# Patient Record
Sex: MALE | Race: WHITE | Employment: OTHER | URBAN - NONMETROPOLITAN AREA
[De-identification: names, ages, dates, MRNs, and addresses within clinical notes are randomized per-mention and may not be internally consistent; named-entity substitution may affect disease eponyms.]

---

## 2017-01-06 ENCOUNTER — NURSE ONLY (OUTPATIENT)
Dept: LAB | Age: 82
End: 2017-01-06

## 2017-01-06 DIAGNOSIS — Z23 NEED FOR VACCINATION: Primary | ICD-10-CM

## 2017-01-06 PROCEDURE — 90686 IIV4 VACC NO PRSV 0.5 ML IM: CPT | Performed by: FAMILY MEDICINE

## 2017-01-06 PROCEDURE — G0008 ADMIN INFLUENZA VIRUS VAC: HCPCS | Performed by: FAMILY MEDICINE

## 2017-01-09 RX ORDER — CLOPIDOGREL BISULFATE 75 MG/1
TABLET ORAL
Qty: 90 TABLET | Refills: 2 | Status: ON HOLD | OUTPATIENT
Start: 2017-01-09 | End: 2017-10-22 | Stop reason: ALTCHOICE

## 2017-01-09 RX ORDER — HYDROCHLOROTHIAZIDE 25 MG/1
TABLET ORAL
Qty: 90 TABLET | Refills: 2 | Status: ON HOLD | OUTPATIENT
Start: 2017-01-09 | End: 2017-10-24 | Stop reason: HOSPADM

## 2017-01-16 RX ORDER — LISINOPRIL 20 MG/1
TABLET ORAL
Qty: 90 TABLET | Refills: 2 | Status: SHIPPED | OUTPATIENT
Start: 2017-01-16 | End: 2017-10-13 | Stop reason: SDUPTHER

## 2017-01-23 RX ORDER — RIVAROXABAN 20 MG/1
TABLET, FILM COATED ORAL
Qty: 90 TABLET | Refills: 0 | Status: SHIPPED | OUTPATIENT
Start: 2017-01-23 | End: 2017-04-21 | Stop reason: SDUPTHER

## 2017-02-09 RX ORDER — TAMSULOSIN HYDROCHLORIDE 0.4 MG/1
CAPSULE ORAL
Qty: 90 CAPSULE | Refills: 0 | Status: SHIPPED | OUTPATIENT
Start: 2017-02-09 | End: 2017-05-08 | Stop reason: SDUPTHER

## 2017-03-23 RX ORDER — BETHANECHOL CHLORIDE 25 MG/1
25 TABLET ORAL EVERY 6 HOURS
Qty: 120 TABLET | Refills: 3 | Status: SHIPPED | OUTPATIENT
Start: 2017-03-23 | End: 2017-11-28 | Stop reason: SDUPTHER

## 2017-05-08 RX ORDER — TAMSULOSIN HYDROCHLORIDE 0.4 MG/1
CAPSULE ORAL
Qty: 90 CAPSULE | Refills: 1 | Status: SHIPPED | OUTPATIENT
Start: 2017-05-08 | End: 2017-11-03 | Stop reason: SDUPTHER

## 2017-06-05 RX ORDER — FUROSEMIDE 40 MG/1
TABLET ORAL
Qty: 90 TABLET | Refills: 0 | Status: SHIPPED | OUTPATIENT
Start: 2017-06-05 | End: 2017-09-03 | Stop reason: SDUPTHER

## 2017-07-24 RX ORDER — RIVAROXABAN 20 MG/1
TABLET, FILM COATED ORAL
Qty: 90 TABLET | Refills: 1 | Status: ON HOLD | OUTPATIENT
Start: 2017-07-24 | End: 2017-10-24 | Stop reason: HOSPADM

## 2017-08-28 RX ORDER — SIMVASTATIN 20 MG
TABLET ORAL
Qty: 90 TABLET | Refills: 2 | Status: SHIPPED | OUTPATIENT
Start: 2017-08-28 | End: 2018-05-25 | Stop reason: SDUPTHER

## 2017-09-05 RX ORDER — FUROSEMIDE 40 MG/1
TABLET ORAL
Qty: 90 TABLET | Refills: 0 | Status: SHIPPED | OUTPATIENT
Start: 2017-09-05 | End: 2017-11-03 | Stop reason: SDUPTHER

## 2017-10-15 RX ORDER — LISINOPRIL 20 MG/1
20 TABLET ORAL DAILY
Qty: 90 TABLET | Refills: 0 | Status: SHIPPED | OUTPATIENT
Start: 2017-10-15 | End: 2017-11-03 | Stop reason: SDUPTHER

## 2017-10-22 ENCOUNTER — APPOINTMENT (OUTPATIENT)
Dept: GENERAL RADIOLOGY | Age: 82
DRG: 313 | End: 2017-10-22
Payer: MEDICARE

## 2017-10-22 ENCOUNTER — HOSPITAL ENCOUNTER (INPATIENT)
Age: 82
LOS: 1 days | Discharge: OTHER INSTITUTION | DRG: 313 | End: 2017-10-23
Attending: SPECIALIST | Admitting: FAMILY MEDICINE
Payer: MEDICARE

## 2017-10-22 DIAGNOSIS — I16.0 HYPERTENSIVE URGENCY: ICD-10-CM

## 2017-10-22 DIAGNOSIS — R07.9 CHEST PAIN, UNSPECIFIED TYPE: Primary | ICD-10-CM

## 2017-10-22 LAB
ABSOLUTE EOS #: 0 K/UL (ref 0–0.4)
ABSOLUTE IMMATURE GRANULOCYTE: ABNORMAL K/UL (ref 0–0.3)
ABSOLUTE LYMPH #: 1.6 K/UL (ref 1–4.8)
ABSOLUTE MONO #: 0.7 K/UL (ref 0.1–1.2)
ANION GAP SERPL CALCULATED.3IONS-SCNC: 13 MMOL/L (ref 9–17)
BASOPHILS # BLD: 1 % (ref 0–2)
BASOPHILS ABSOLUTE: 0.1 K/UL (ref 0–0.2)
BUN BLDV-MCNC: 20 MG/DL (ref 8–23)
BUN/CREAT BLD: 14 (ref 9–20)
CALCIUM SERPL-MCNC: 9.8 MG/DL (ref 8.6–10.4)
CHLORIDE BLD-SCNC: 98 MMOL/L (ref 98–107)
CO2: 29 MMOL/L (ref 20–31)
CREAT SERPL-MCNC: 1.44 MG/DL (ref 0.7–1.2)
D DIMER: 2450 NG/ML
DIFFERENTIAL TYPE: ABNORMAL
EKG ATRIAL RATE: 66 BPM
EKG P AXIS: 85 DEGREES
EKG P-R INTERVAL: 236 MS
EKG Q-T INTERVAL: 404 MS
EKG QRS DURATION: 98 MS
EKG QTC CALCULATION (BAZETT): 423 MS
EKG R AXIS: 62 DEGREES
EKG T AXIS: 67 DEGREES
EKG VENTRICULAR RATE: 66 BPM
EOSINOPHILS RELATIVE PERCENT: 0 % (ref 1–8)
GFR AFRICAN AMERICAN: 57 ML/MIN
GFR NON-AFRICAN AMERICAN: 47 ML/MIN
GFR SERPL CREATININE-BSD FRML MDRD: ABNORMAL ML/MIN/{1.73_M2}
GFR SERPL CREATININE-BSD FRML MDRD: ABNORMAL ML/MIN/{1.73_M2}
GLUCOSE BLD-MCNC: 118 MG/DL (ref 70–99)
HCT VFR BLD CALC: 46.2 % (ref 41–53)
HEMOGLOBIN: 15.5 G/DL (ref 13.5–17.5)
IMMATURE GRANULOCYTES: ABNORMAL %
LIPASE: 48 U/L (ref 13–60)
LYMPHOCYTES # BLD: 13 % (ref 15–43)
MAGNESIUM: 2.1 MG/DL (ref 1.6–2.6)
MCH RBC QN AUTO: 30.2 PG (ref 26–34)
MCHC RBC AUTO-ENTMCNC: 33.6 G/DL (ref 31–37)
MCV RBC AUTO: 90.1 FL (ref 80–100)
MONOCYTES # BLD: 6 % (ref 6–14)
MYOGLOBIN: 49 NG/ML (ref 28–72)
MYOGLOBIN: 55 NG/ML (ref 28–72)
PDW BLD-RTO: 16 % (ref 11–14.5)
PLATELET # BLD: 157 K/UL (ref 140–450)
PLATELET ESTIMATE: ABNORMAL
PMV BLD AUTO: 9.3 FL (ref 6–12)
POTASSIUM SERPL-SCNC: 4 MMOL/L (ref 3.7–5.3)
RBC # BLD: 5.13 M/UL (ref 4.5–5.9)
RBC # BLD: ABNORMAL 10*6/UL
SEG NEUTROPHILS: 80 % (ref 44–74)
SEGMENTED NEUTROPHILS ABSOLUTE COUNT: 9.6 K/UL (ref 1.8–7.7)
SODIUM BLD-SCNC: 140 MMOL/L (ref 135–144)
TROPONIN INTERP: NORMAL
TROPONIN T: <0.03 NG/ML
WBC # BLD: 12 K/UL (ref 3.5–11)
WBC # BLD: ABNORMAL 10*3/UL

## 2017-10-22 PROCEDURE — 84484 ASSAY OF TROPONIN QUANT: CPT

## 2017-10-22 PROCEDURE — 6360000002 HC RX W HCPCS: Performed by: FAMILY MEDICINE

## 2017-10-22 PROCEDURE — 96365 THER/PROPH/DIAG IV INF INIT: CPT

## 2017-10-22 PROCEDURE — 96366 THER/PROPH/DIAG IV INF ADDON: CPT

## 2017-10-22 PROCEDURE — 93005 ELECTROCARDIOGRAM TRACING: CPT

## 2017-10-22 PROCEDURE — 36415 COLL VENOUS BLD VENIPUNCTURE: CPT

## 2017-10-22 PROCEDURE — 6370000000 HC RX 637 (ALT 250 FOR IP): Performed by: SPECIALIST

## 2017-10-22 PROCEDURE — 71020 XR CHEST STANDARD TWO VW: CPT

## 2017-10-22 PROCEDURE — 2000000000 HC ICU R&B

## 2017-10-22 PROCEDURE — 6360000002 HC RX W HCPCS: Performed by: NURSE PRACTITIONER

## 2017-10-22 PROCEDURE — 80048 BASIC METABOLIC PNL TOTAL CA: CPT

## 2017-10-22 PROCEDURE — 2580000003 HC RX 258: Performed by: FAMILY MEDICINE

## 2017-10-22 PROCEDURE — 87040 BLOOD CULTURE FOR BACTERIA: CPT

## 2017-10-22 PROCEDURE — C9113 INJ PANTOPRAZOLE SODIUM, VIA: HCPCS | Performed by: NURSE PRACTITIONER

## 2017-10-22 PROCEDURE — 94761 N-INVAS EAR/PLS OXIMETRY MLT: CPT

## 2017-10-22 PROCEDURE — 96375 TX/PRO/DX INJ NEW DRUG ADDON: CPT

## 2017-10-22 PROCEDURE — 83735 ASSAY OF MAGNESIUM: CPT

## 2017-10-22 PROCEDURE — 83874 ASSAY OF MYOGLOBIN: CPT

## 2017-10-22 PROCEDURE — 83690 ASSAY OF LIPASE: CPT

## 2017-10-22 PROCEDURE — 99285 EMERGENCY DEPT VISIT HI MDM: CPT

## 2017-10-22 PROCEDURE — 2580000003 HC RX 258: Performed by: NURSE PRACTITIONER

## 2017-10-22 PROCEDURE — 96376 TX/PRO/DX INJ SAME DRUG ADON: CPT

## 2017-10-22 PROCEDURE — 85379 FIBRIN DEGRADATION QUANT: CPT

## 2017-10-22 PROCEDURE — 6360000002 HC RX W HCPCS: Performed by: SPECIALIST

## 2017-10-22 PROCEDURE — 85025 COMPLETE CBC W/AUTO DIFF WBC: CPT

## 2017-10-22 PROCEDURE — 2500000003 HC RX 250 WO HCPCS: Performed by: SPECIALIST

## 2017-10-22 PROCEDURE — 6370000000 HC RX 637 (ALT 250 FOR IP): Performed by: FAMILY MEDICINE

## 2017-10-22 RX ORDER — PANTOPRAZOLE SODIUM 40 MG/10ML
40 INJECTION, POWDER, LYOPHILIZED, FOR SOLUTION INTRAVENOUS DAILY
Status: DISCONTINUED | OUTPATIENT
Start: 2017-10-22 | End: 2017-10-23 | Stop reason: HOSPADM

## 2017-10-22 RX ORDER — ACETAMINOPHEN 325 MG/1
650 TABLET ORAL EVERY 4 HOURS PRN
Status: DISCONTINUED | OUTPATIENT
Start: 2017-10-22 | End: 2017-10-23 | Stop reason: HOSPADM

## 2017-10-22 RX ORDER — 0.9 % SODIUM CHLORIDE 0.9 %
10 VIAL (ML) INJECTION DAILY
Status: DISCONTINUED | OUTPATIENT
Start: 2017-10-22 | End: 2017-10-23 | Stop reason: HOSPADM

## 2017-10-22 RX ORDER — ONDANSETRON 2 MG/ML
4 INJECTION INTRAMUSCULAR; INTRAVENOUS ONCE
Status: COMPLETED | OUTPATIENT
Start: 2017-10-22 | End: 2017-10-22

## 2017-10-22 RX ORDER — ONDANSETRON 2 MG/ML
4 INJECTION INTRAMUSCULAR; INTRAVENOUS EVERY 6 HOURS PRN
Status: DISCONTINUED | OUTPATIENT
Start: 2017-10-22 | End: 2017-10-23 | Stop reason: HOSPADM

## 2017-10-22 RX ORDER — NITROGLYCERIN 0.4 MG/1
0.4 TABLET SUBLINGUAL ONCE
Status: COMPLETED | OUTPATIENT
Start: 2017-10-22 | End: 2017-10-22

## 2017-10-22 RX ORDER — LEVOFLOXACIN 5 MG/ML
500 INJECTION, SOLUTION INTRAVENOUS EVERY 24 HOURS
Status: DISCONTINUED | OUTPATIENT
Start: 2017-10-22 | End: 2017-10-23 | Stop reason: HOSPADM

## 2017-10-22 RX ORDER — MORPHINE SULFATE 4 MG/ML
4 INJECTION, SOLUTION INTRAMUSCULAR; INTRAVENOUS
Status: DISCONTINUED | OUTPATIENT
Start: 2017-10-22 | End: 2017-10-23 | Stop reason: HOSPADM

## 2017-10-22 RX ORDER — ASPIRIN 81 MG/1
324 TABLET, CHEWABLE ORAL ONCE
Status: COMPLETED | OUTPATIENT
Start: 2017-10-22 | End: 2017-10-22

## 2017-10-22 RX ORDER — AMLODIPINE BESYLATE 5 MG/1
10 TABLET ORAL DAILY
Status: DISCONTINUED | OUTPATIENT
Start: 2017-10-22 | End: 2017-10-23 | Stop reason: HOSPADM

## 2017-10-22 RX ORDER — ASPIRIN 81 MG/1
81 TABLET ORAL DAILY
Status: DISCONTINUED | OUTPATIENT
Start: 2017-10-22 | End: 2017-10-23 | Stop reason: HOSPADM

## 2017-10-22 RX ORDER — CLOPIDOGREL BISULFATE 75 MG/1
75 TABLET ORAL DAILY
Status: DISCONTINUED | OUTPATIENT
Start: 2017-10-22 | End: 2017-10-22 | Stop reason: CLARIF

## 2017-10-22 RX ORDER — LISINOPRIL 20 MG/1
20 TABLET ORAL DAILY
Status: DISCONTINUED | OUTPATIENT
Start: 2017-10-22 | End: 2017-10-23 | Stop reason: HOSPADM

## 2017-10-22 RX ORDER — LABETALOL HYDROCHLORIDE 5 MG/ML
20 INJECTION, SOLUTION INTRAVENOUS ONCE
Status: COMPLETED | OUTPATIENT
Start: 2017-10-22 | End: 2017-10-22

## 2017-10-22 RX ORDER — ONDANSETRON 2 MG/ML
INJECTION INTRAMUSCULAR; INTRAVENOUS
Status: DISCONTINUED
Start: 2017-10-22 | End: 2017-10-22

## 2017-10-22 RX ORDER — FUROSEMIDE 40 MG/1
40 TABLET ORAL DAILY
Status: DISCONTINUED | OUTPATIENT
Start: 2017-10-22 | End: 2017-10-23 | Stop reason: HOSPADM

## 2017-10-22 RX ORDER — MORPHINE SULFATE 2 MG/ML
2 INJECTION, SOLUTION INTRAMUSCULAR; INTRAVENOUS
Status: DISCONTINUED | OUTPATIENT
Start: 2017-10-22 | End: 2017-10-23 | Stop reason: HOSPADM

## 2017-10-22 RX ORDER — PROMETHAZINE HYDROCHLORIDE 25 MG/ML
12.5 INJECTION, SOLUTION INTRAMUSCULAR; INTRAVENOUS ONCE
Status: COMPLETED | OUTPATIENT
Start: 2017-10-22 | End: 2017-10-22

## 2017-10-22 RX ORDER — BETHANECHOL CHLORIDE 25 MG/1
25 TABLET ORAL EVERY 6 HOURS
Status: DISCONTINUED | OUTPATIENT
Start: 2017-10-22 | End: 2017-10-23 | Stop reason: HOSPADM

## 2017-10-22 RX ORDER — SODIUM CHLORIDE 0.9 % (FLUSH) 0.9 %
10 SYRINGE (ML) INJECTION PRN
Status: DISCONTINUED | OUTPATIENT
Start: 2017-10-22 | End: 2017-10-23 | Stop reason: HOSPADM

## 2017-10-22 RX ORDER — SIMVASTATIN 20 MG
20 TABLET ORAL NIGHTLY
Status: DISCONTINUED | OUTPATIENT
Start: 2017-10-22 | End: 2017-10-23 | Stop reason: HOSPADM

## 2017-10-22 RX ORDER — SODIUM CHLORIDE 0.9 % (FLUSH) 0.9 %
10 SYRINGE (ML) INJECTION EVERY 12 HOURS SCHEDULED
Status: DISCONTINUED | OUTPATIENT
Start: 2017-10-22 | End: 2017-10-23 | Stop reason: HOSPADM

## 2017-10-22 RX ORDER — NITROGLYCERIN 20 MG/100ML
10 INJECTION INTRAVENOUS CONTINUOUS
Status: DISCONTINUED | OUTPATIENT
Start: 2017-10-22 | End: 2017-10-23 | Stop reason: HOSPADM

## 2017-10-22 RX ORDER — TAMSULOSIN HYDROCHLORIDE 0.4 MG/1
0.4 CAPSULE ORAL DAILY
Status: DISCONTINUED | OUTPATIENT
Start: 2017-10-22 | End: 2017-10-23 | Stop reason: HOSPADM

## 2017-10-22 RX ADMIN — PANTOPRAZOLE SODIUM 40 MG: 40 INJECTION, POWDER, FOR SOLUTION INTRAVENOUS at 20:47

## 2017-10-22 RX ADMIN — FUROSEMIDE 40 MG: 40 TABLET ORAL at 14:54

## 2017-10-22 RX ADMIN — LEVOFLOXACIN 500 MG: 5 INJECTION, SOLUTION INTRAVENOUS at 17:10

## 2017-10-22 RX ADMIN — NITROGLYCERIN 10 MCG/MIN: 20 INJECTION INTRAVENOUS at 08:46

## 2017-10-22 RX ADMIN — ACETAMINOPHEN 650 MG: 325 TABLET ORAL at 16:23

## 2017-10-22 RX ADMIN — NITROGLYCERIN 0.4 MG: 0.4 TABLET SUBLINGUAL at 08:44

## 2017-10-22 RX ADMIN — Medication 10 ML: at 14:53

## 2017-10-22 RX ADMIN — ONDANSETRON 4 MG: 2 INJECTION INTRAMUSCULAR; INTRAVENOUS at 10:07

## 2017-10-22 RX ADMIN — ONDANSETRON 4 MG: 2 INJECTION INTRAMUSCULAR; INTRAVENOUS at 08:42

## 2017-10-22 RX ADMIN — Medication 10 ML: at 20:47

## 2017-10-22 RX ADMIN — BETHANECHOL CHLORIDE 25 MG: 25 TABLET ORAL at 15:00

## 2017-10-22 RX ADMIN — LABETALOL HYDROCHLORIDE 20 MG: 5 INJECTION, SOLUTION INTRAVENOUS at 12:56

## 2017-10-22 RX ADMIN — RIVAROXABAN 20 MG: 10 TABLET, FILM COATED ORAL at 17:49

## 2017-10-22 RX ADMIN — MORPHINE SULFATE 2 MG: 2 INJECTION, SOLUTION INTRAMUSCULAR; INTRAVENOUS at 14:53

## 2017-10-22 RX ADMIN — AMLODIPINE BESYLATE 10 MG: 5 TABLET ORAL at 15:01

## 2017-10-22 RX ADMIN — LISINOPRIL 20 MG: 20 TABLET ORAL at 14:57

## 2017-10-22 RX ADMIN — ONDANSETRON 4 MG: 2 INJECTION INTRAMUSCULAR; INTRAVENOUS at 21:15

## 2017-10-22 RX ADMIN — ASPIRIN 81 MG 324 MG: 81 TABLET ORAL at 11:16

## 2017-10-22 RX ADMIN — Medication 10 ML: at 21:16

## 2017-10-22 RX ADMIN — PROMETHAZINE HYDROCHLORIDE 12.5 MG: 25 INJECTION INTRAMUSCULAR; INTRAVENOUS at 09:32

## 2017-10-22 RX ADMIN — TAMSULOSIN HYDROCHLORIDE 0.4 MG: 0.4 CAPSULE ORAL at 14:59

## 2017-10-22 ASSESSMENT — PAIN DESCRIPTION - PAIN TYPE
TYPE: ACUTE PAIN

## 2017-10-22 ASSESSMENT — PAIN SCALES - GENERAL
PAINLEVEL_OUTOF10: 4
PAINLEVEL_OUTOF10: 0
PAINLEVEL_OUTOF10: 1
PAINLEVEL_OUTOF10: 0
PAINLEVEL_OUTOF10: 2
PAINLEVEL_OUTOF10: 5
PAINLEVEL_OUTOF10: 5
PAINLEVEL_OUTOF10: 4
PAINLEVEL_OUTOF10: 4
PAINLEVEL_OUTOF10: 1
PAINLEVEL_OUTOF10: 5

## 2017-10-22 ASSESSMENT — PAIN DESCRIPTION - LOCATION
LOCATION: CHEST

## 2017-10-22 ASSESSMENT — PAIN DESCRIPTION - DESCRIPTORS
DESCRIPTORS: OTHER (COMMENT)
DESCRIPTORS: HEAVINESS
DESCRIPTORS: DULL;HEAVINESS

## 2017-10-22 ASSESSMENT — PAIN DESCRIPTION - ONSET
ONSET: ON-GOING

## 2017-10-22 ASSESSMENT — PAIN - FUNCTIONAL ASSESSMENT: PAIN_FUNCTIONAL_ASSESSMENT: 0-10

## 2017-10-22 ASSESSMENT — PAIN DESCRIPTION - FREQUENCY
FREQUENCY: CONTINUOUS

## 2017-10-22 ASSESSMENT — PAIN DESCRIPTION - ORIENTATION
ORIENTATION: LEFT
ORIENTATION: LEFT
ORIENTATION: MID
ORIENTATION: LEFT
ORIENTATION: LEFT
ORIENTATION: MID
ORIENTATION: RIGHT;LEFT;ANTERIOR
ORIENTATION: LEFT

## 2017-10-22 ASSESSMENT — PAIN DESCRIPTION - PROGRESSION
CLINICAL_PROGRESSION: GRADUALLY WORSENING
CLINICAL_PROGRESSION: NOT CHANGED
CLINICAL_PROGRESSION: NOT CHANGED
CLINICAL_PROGRESSION: GRADUALLY WORSENING
CLINICAL_PROGRESSION: NOT CHANGED

## 2017-10-22 ASSESSMENT — ENCOUNTER SYMPTOMS
VOMITING: 1
COUGH: 0
BACK PAIN: 0
SHORTNESS OF BREATH: 0
NAUSEA: 1
WHEEZING: 0
ABDOMINAL PAIN: 0

## 2017-10-22 NOTE — ED NOTES
Patient had verbalized the need to use bathroom. Urinal had been offered, patient stated he wasn't sure if he was going to need to have a BM or not. BSC was provided. Patient got up to [facility] and back to bed with no difficulty, writer was only needed to manage the cords. Patient did urinate and have a small BM.        Pedro Garcia RN  10/22/17 5212

## 2017-10-22 NOTE — ED NOTES
At 1102 patient had 18 beats V-Tach on monitor. Dr. Ary George discussed this with patient and wife and addressed code status. Patient had agreed to [facility] status. Writer reviewed this again with patient and wife. Patient signed DNR-CCA paper.      Daisy Lucas RN  10/22/17 9038

## 2017-10-22 NOTE — ED PROVIDER NOTES
indicated that two of his four sisters are . He indicated that only one of his two brothers is alive. He indicated that the status of his neg hx is unknown.      family history includes Cancer in his sister and sister. SOCIAL HISTORY      reports that he quit smoking about 12 years ago. His smoking use included Cigarettes. He smoked 0.50 packs per day. He has never used smokeless tobacco. He reports that he drinks alcohol. He reports that he does not use drugs. PHYSICAL EXAM     INITIAL VITALS:  height is 5' 11\" (1.803 m) and weight is 226 lb 6.4 oz (102.7 kg). His oral temperature is 97.2 °F (36.2 °C). His blood pressure is 165/77 (abnormal) and his pulse is 74. His respiration is 50 (abnormal) and oxygen saturation is 96%. Physical Exam   Constitutional: He is oriented to person, place, and time. He appears well-developed and well-nourished. No distress. HENT:   Head: Normocephalic and atraumatic. Nose: Nose normal.   Mouth/Throat: Oropharynx is clear and moist. No oropharyngeal exudate. Eyes: EOM are normal. Pupils are equal, round, and reactive to light. No scleral icterus. Neck: Neck supple. No JVD present. No tracheal deviation present. No thyromegaly present. Cardiovascular: Normal rate, regular rhythm, normal heart sounds and intact distal pulses. Exam reveals no gallop and no friction rub. No murmur heard. Pulmonary/Chest: Effort normal and breath sounds normal. No respiratory distress. He has no wheezes. He has no rales. Abdominal: Soft. Bowel sounds are normal. He exhibits no distension and no mass. There is no tenderness. There is no rebound and no guarding. Musculoskeletal: He exhibits no edema or tenderness. Lymphadenopathy:     He has no cervical adenopathy. Neurological: He is alert and oriented to person, place, and time. He displays normal reflexes. No cranial nerve deficit. Skin: Skin is warm and dry. No rash noted. No erythema.    Nursing note and vitals pressure down to around 839 systolic range. He was also given Phenergan 12.5 mg and Zofran 4 mg IV push twice for the nausea. He was given aspirin 324 mg orally. He was also given labetalol 20 mg IV push for the persistent hypertension. I have reviewed the disposition diagnosis with the patient and or their family/guardian. I have answered their questions and given discharge instructions. They voiced understanding of these instructions and did not have any further questions or complaints. Re-evaluation Notes    Upon reevaluation, patient is feeling much better and resting comfortably. CRITICAL CARE:   IP CONSULT TO HOSPITALIST  IP CONSULT TO CARDIOLOGY    CRITICAL CARE: There was a high probability of clinically significant/life threatening deterioration in this patient's condition which required my urgent intervention. Total critical care time was 45 minutes. This excludes any time for separately reportable procedures. CONSULTS: I discussed the case with hospitalist Dr. Maura Cordero who kindly accepted the patient on step down unit. Cardiologist Dr. Kendrick Gray was paged for consultation. PROCEDURES:  None    FINAL IMPRESSION      1. Chest pain, unspecified type    2. Hypertensive urgency          DISPOSITION/PLAN   DISPOSITION     Condition on Disposition    stable    PATIENT REFERRED TO:  Davin Wilhelm MD  [address] [place], [place] [address]Beronica Collins  [phone]            DISCHARGE MEDICATIONS:  Current Discharge Medication List          (Please note that portions of this note were completed with a voice recognition program.  Efforts were made to edit the dictations but occasionally words are mis-transcribed.)    Leyva MD, F.A.C.E.P.   Attending Emergency Physician                           Rg Hoffman MD  10/22/17 2046

## 2017-10-22 NOTE — ED NOTES
Writer is in room to increase dose of nitro drip. Writer notes writer can hear patient breathing from by the door. Writer had not noted this before. Respirations are regular but deep. Patient states chest pain continues. SpO2 remains at 95% or greater. Writer applied oxygen at 2lpm per nasal cannula for comfort, to help with the work of breathing.        River Ceja RN  10/22/17 7533

## 2017-10-22 NOTE — ED NOTES
Writer tried to give patient his aspirin. He does not want to take it now for fear of vomiting. He continue to be very nauseated. Dr. Corenl Pena is advised.      Calli Álvarez RN  10/22/17 8795

## 2017-10-22 NOTE — PLAN OF CARE
Problem: Pain:  Goal: Pain level will decrease  Pain level will decrease   Outcome: Ongoing    Goal: Control of acute pain  Control of acute pain   Outcome: Ongoing      Problem: Falls - Risk of  Goal: Absence of falls  Outcome: Ongoing      Problem: Cardiac Output - Decreased:  Goal: Hemodynamic stability will improve  Hemodynamic stability will improve  Outcome: Ongoing

## 2017-10-23 ENCOUNTER — HOSPITAL ENCOUNTER (INPATIENT)
Age: 82
LOS: 1 days | Discharge: HOME | DRG: 287 | End: 2017-10-24
Attending: INTERNAL MEDICINE | Admitting: INTERNAL MEDICINE
Payer: MEDICARE

## 2017-10-23 ENCOUNTER — APPOINTMENT (OUTPATIENT)
Dept: CARDIAC CATH/INVASIVE PROCEDURES | Age: 82
DRG: 287 | End: 2017-10-23
Attending: INTERNAL MEDICINE
Payer: MEDICARE

## 2017-10-23 VITALS
HEART RATE: 70 BPM | RESPIRATION RATE: 21 BRPM | BODY MASS INDEX: 31.69 KG/M2 | SYSTOLIC BLOOD PRESSURE: 134 MMHG | OXYGEN SATURATION: 97 % | WEIGHT: 226.4 LBS | TEMPERATURE: 98.4 F | HEIGHT: 71 IN | DIASTOLIC BLOOD PRESSURE: 65 MMHG

## 2017-10-23 LAB
ABSOLUTE EOS #: 0.1 K/UL (ref 0–0.4)
ABSOLUTE IMMATURE GRANULOCYTE: ABNORMAL K/UL (ref 0–0.3)
ABSOLUTE LYMPH #: 1.3 K/UL (ref 1–4.8)
ABSOLUTE MONO #: 0.9 K/UL (ref 0.1–1.2)
ACTIVATED CLOTTING TIME: 172 SEC (ref 79–149)
ANION GAP SERPL CALCULATED.3IONS-SCNC: 11 MMOL/L (ref 9–17)
BASOPHILS # BLD: 1 % (ref 0–2)
BASOPHILS ABSOLUTE: 0 K/UL (ref 0–0.2)
BUN BLDV-MCNC: 26 MG/DL (ref 8–23)
BUN/CREAT BLD: 14 (ref 9–20)
CALCIUM SERPL-MCNC: 8.6 MG/DL (ref 8.6–10.4)
CHLORIDE BLD-SCNC: 102 MMOL/L (ref 98–107)
CHOLESTEROL/HDL RATIO: 2.8
CHOLESTEROL: 113 MG/DL
CO2: 28 MMOL/L (ref 20–31)
CREAT SERPL-MCNC: 1.86 MG/DL (ref 0.7–1.2)
DIFFERENTIAL TYPE: ABNORMAL
EKG ATRIAL RATE: 60 BPM
EKG ATRIAL RATE: 91 BPM
EKG P AXIS: 101 DEGREES
EKG P AXIS: 103 DEGREES
EKG P-R INTERVAL: 224 MS
EKG P-R INTERVAL: 246 MS
EKG Q-T INTERVAL: 374 MS
EKG Q-T INTERVAL: 412 MS
EKG QRS DURATION: 92 MS
EKG QRS DURATION: 94 MS
EKG QTC CALCULATION (BAZETT): 412 MS
EKG QTC CALCULATION (BAZETT): 460 MS
EKG R AXIS: 22 DEGREES
EKG R AXIS: 9 DEGREES
EKG T AXIS: 124 DEGREES
EKG T AXIS: 124 DEGREES
EKG VENTRICULAR RATE: 60 BPM
EKG VENTRICULAR RATE: 91 BPM
EOSINOPHILS RELATIVE PERCENT: 2 % (ref 1–8)
GFR AFRICAN AMERICAN: 42 ML/MIN
GFR NON-AFRICAN AMERICAN: 35 ML/MIN
GFR SERPL CREATININE-BSD FRML MDRD: ABNORMAL ML/MIN/{1.73_M2}
GFR SERPL CREATININE-BSD FRML MDRD: ABNORMAL ML/MIN/{1.73_M2}
GLUCOSE BLD-MCNC: 114 MG/DL (ref 70–99)
HCT VFR BLD CALC: 38.2 % (ref 41–53)
HCT VFR BLD CALC: 38.3 % (ref 41–53)
HDLC SERPL-MCNC: 40 MG/DL
HEMOGLOBIN: 12.7 G/DL (ref 13.5–17.5)
HEMOGLOBIN: 12.8 G/DL (ref 13.5–17.5)
IMMATURE GRANULOCYTES: ABNORMAL %
LDL CHOLESTEROL: 58 MG/DL (ref 0–130)
LYMPHOCYTES # BLD: 17 % (ref 15–43)
MCH RBC QN AUTO: 29.9 PG (ref 26–34)
MCH RBC QN AUTO: 30.4 PG (ref 26–34)
MCHC RBC AUTO-ENTMCNC: 33.2 G/DL (ref 31–37)
MCHC RBC AUTO-ENTMCNC: 33.4 G/DL (ref 31–37)
MCV RBC AUTO: 90.1 FL (ref 80–100)
MCV RBC AUTO: 91.2 FL (ref 80–100)
MONOCYTES # BLD: 12 % (ref 6–14)
PARTIAL THROMBOPLASTIN TIME: 31.8 SEC (ref 21.3–31.3)
PARTIAL THROMBOPLASTIN TIME: 36.7 SEC (ref 21.3–31.3)
PDW BLD-RTO: 15.8 % (ref 11–14.5)
PDW BLD-RTO: 16.2 % (ref 12.5–15.4)
PLATELET # BLD: 120 K/UL (ref 140–450)
PLATELET # BLD: 120 K/UL (ref 140–450)
PLATELET ESTIMATE: ABNORMAL
PMV BLD AUTO: 9.1 FL (ref 6–12)
PMV BLD AUTO: 9.1 FL (ref 6–12)
POC TROPONIN I: 0.05 NG/ML (ref 0–0.08)
POC TROPONIN INTERP: NORMAL
POTASSIUM SERPL-SCNC: 4.3 MMOL/L (ref 3.7–5.3)
RBC # BLD: 4.2 M/UL (ref 4.5–5.9)
RBC # BLD: 4.24 M/UL (ref 4.5–5.9)
RBC # BLD: ABNORMAL 10*6/UL
SEG NEUTROPHILS: 68 % (ref 44–74)
SEGMENTED NEUTROPHILS ABSOLUTE COUNT: 5.2 K/UL (ref 1.8–7.7)
SODIUM BLD-SCNC: 141 MMOL/L (ref 135–144)
TRIGL SERPL-MCNC: 75 MG/DL
TROPONIN INTERP: NORMAL
TROPONIN T: <0.03 NG/ML
VLDLC SERPL CALC-MCNC: ABNORMAL MG/DL (ref 1–30)
WBC # BLD: 6.8 K/UL (ref 3.5–11)
WBC # BLD: 7.6 K/UL (ref 3.5–11)
WBC # BLD: ABNORMAL 10*3/UL

## 2017-10-23 PROCEDURE — 85025 COMPLETE CBC W/AUTO DIFF WBC: CPT

## 2017-10-23 PROCEDURE — 6360000002 HC RX W HCPCS: Performed by: FAMILY MEDICINE

## 2017-10-23 PROCEDURE — 93005 ELECTROCARDIOGRAM TRACING: CPT

## 2017-10-23 PROCEDURE — 2580000003 HC RX 258: Performed by: NURSE PRACTITIONER

## 2017-10-23 PROCEDURE — 6370000000 HC RX 637 (ALT 250 FOR IP): Performed by: INTERNAL MEDICINE

## 2017-10-23 PROCEDURE — 80061 LIPID PANEL: CPT

## 2017-10-23 PROCEDURE — 7100000011 HC PHASE II RECOVERY - ADDTL 15 MIN

## 2017-10-23 PROCEDURE — 2500000003 HC RX 250 WO HCPCS: Performed by: INTERNAL MEDICINE

## 2017-10-23 PROCEDURE — 85027 COMPLETE CBC AUTOMATED: CPT

## 2017-10-23 PROCEDURE — 85730 THROMBOPLASTIN TIME PARTIAL: CPT

## 2017-10-23 PROCEDURE — 80048 BASIC METABOLIC PNL TOTAL CA: CPT

## 2017-10-23 PROCEDURE — 94762 N-INVAS EAR/PLS OXIMTRY CONT: CPT

## 2017-10-23 PROCEDURE — C1894 INTRO/SHEATH, NON-LASER: HCPCS

## 2017-10-23 PROCEDURE — C1769 GUIDE WIRE: HCPCS

## 2017-10-23 PROCEDURE — 36415 COLL VENOUS BLD VENIPUNCTURE: CPT

## 2017-10-23 PROCEDURE — B2181ZZ FLUOROSCOPY OF LEFT INTERNAL MAMMARY BYPASS GRAFT USING LOW OSMOLAR CONTRAST: ICD-10-PCS | Performed by: INTERNAL MEDICINE

## 2017-10-23 PROCEDURE — 7100000010 HC PHASE II RECOVERY - FIRST 15 MIN

## 2017-10-23 PROCEDURE — 2580000003 HC RX 258: Performed by: INTERNAL MEDICINE

## 2017-10-23 PROCEDURE — B2111ZZ FLUOROSCOPY OF MULTIPLE CORONARY ARTERIES USING LOW OSMOLAR CONTRAST: ICD-10-PCS | Performed by: INTERNAL MEDICINE

## 2017-10-23 PROCEDURE — 1200000000 HC SEMI PRIVATE

## 2017-10-23 PROCEDURE — 84484 ASSAY OF TROPONIN QUANT: CPT

## 2017-10-23 PROCEDURE — 6370000000 HC RX 637 (ALT 250 FOR IP): Performed by: NURSE PRACTITIONER

## 2017-10-23 PROCEDURE — C1725 CATH, TRANSLUMIN NON-LASER: HCPCS

## 2017-10-23 PROCEDURE — 85347 COAGULATION TIME ACTIVATED: CPT

## 2017-10-23 PROCEDURE — 93459 L HRT ART/GRFT ANGIO: CPT | Performed by: INTERNAL MEDICINE

## 2017-10-23 PROCEDURE — B2131ZZ FLUOROSCOPY OF MULTIPLE CORONARY ARTERY BYPASS GRAFTS USING LOW OSMOLAR CONTRAST: ICD-10-PCS | Performed by: INTERNAL MEDICINE

## 2017-10-23 PROCEDURE — 6360000002 HC RX W HCPCS

## 2017-10-23 PROCEDURE — 4A023N7 MEASUREMENT OF CARDIAC SAMPLING AND PRESSURE, LEFT HEART, PERCUTANEOUS APPROACH: ICD-10-PCS | Performed by: INTERNAL MEDICINE

## 2017-10-23 PROCEDURE — 6360000002 HC RX W HCPCS: Performed by: INTERNAL MEDICINE

## 2017-10-23 RX ORDER — TAMSULOSIN HYDROCHLORIDE 0.4 MG/1
0.4 CAPSULE ORAL DAILY
Status: DISCONTINUED | OUTPATIENT
Start: 2017-10-23 | End: 2017-10-24 | Stop reason: HOSPADM

## 2017-10-23 RX ORDER — HEPARIN SODIUM 1000 [USP'U]/ML
2000 INJECTION, SOLUTION INTRAVENOUS; SUBCUTANEOUS PRN
Status: DISCONTINUED | OUTPATIENT
Start: 2017-10-23 | End: 2017-10-23 | Stop reason: ALTCHOICE

## 2017-10-23 RX ORDER — HEPARIN SODIUM 10000 [USP'U]/100ML
1000 INJECTION, SOLUTION INTRAVENOUS CONTINUOUS
Status: DISCONTINUED | OUTPATIENT
Start: 2017-10-23 | End: 2017-10-23

## 2017-10-23 RX ORDER — LISINOPRIL 20 MG/1
20 TABLET ORAL DAILY
Status: DISCONTINUED | OUTPATIENT
Start: 2017-10-23 | End: 2017-10-24 | Stop reason: HOSPADM

## 2017-10-23 RX ORDER — MORPHINE SULFATE 4 MG/ML
4 INJECTION, SOLUTION INTRAMUSCULAR; INTRAVENOUS
Status: DISCONTINUED | OUTPATIENT
Start: 2017-10-23 | End: 2017-10-23

## 2017-10-23 RX ORDER — HEPARIN SODIUM 1000 [USP'U]/ML
4000 INJECTION, SOLUTION INTRAVENOUS; SUBCUTANEOUS ONCE
Status: COMPLETED | OUTPATIENT
Start: 2017-10-23 | End: 2017-10-23

## 2017-10-23 RX ORDER — SIMVASTATIN 20 MG
20 TABLET ORAL NIGHTLY
Status: DISCONTINUED | OUTPATIENT
Start: 2017-10-23 | End: 2017-10-24 | Stop reason: HOSPADM

## 2017-10-23 RX ORDER — SODIUM CHLORIDE 0.9 % (FLUSH) 0.9 %
10 SYRINGE (ML) INJECTION PRN
Status: DISCONTINUED | OUTPATIENT
Start: 2017-10-23 | End: 2017-10-24 | Stop reason: HOSPADM

## 2017-10-23 RX ORDER — ACETAMINOPHEN 325 MG/1
650 TABLET ORAL EVERY 4 HOURS PRN
Status: DISCONTINUED | OUTPATIENT
Start: 2017-10-23 | End: 2017-10-24 | Stop reason: HOSPADM

## 2017-10-23 RX ORDER — MORPHINE SULFATE 2 MG/ML
2 INJECTION, SOLUTION INTRAMUSCULAR; INTRAVENOUS
Status: DISCONTINUED | OUTPATIENT
Start: 2017-10-23 | End: 2017-10-23

## 2017-10-23 RX ORDER — NITROGLYCERIN 20 MG/100ML
5 INJECTION INTRAVENOUS CONTINUOUS
Status: DISCONTINUED | OUTPATIENT
Start: 2017-10-23 | End: 2017-10-23

## 2017-10-23 RX ORDER — FUROSEMIDE 40 MG/1
40 TABLET ORAL DAILY
Status: DISCONTINUED | OUTPATIENT
Start: 2017-10-23 | End: 2017-10-24 | Stop reason: HOSPADM

## 2017-10-23 RX ORDER — HYDRALAZINE HYDROCHLORIDE 20 MG/ML
10 INJECTION INTRAMUSCULAR; INTRAVENOUS EVERY 6 HOURS PRN
Status: DISCONTINUED | OUTPATIENT
Start: 2017-10-23 | End: 2017-10-24 | Stop reason: HOSPADM

## 2017-10-23 RX ORDER — SODIUM CHLORIDE 0.9 % (FLUSH) 0.9 %
10 SYRINGE (ML) INJECTION EVERY 12 HOURS SCHEDULED
Status: DISCONTINUED | OUTPATIENT
Start: 2017-10-23 | End: 2017-10-24 | Stop reason: HOSPADM

## 2017-10-23 RX ORDER — 0.9 % SODIUM CHLORIDE 0.9 %
500 INTRAVENOUS SOLUTION INTRAVENOUS ONCE
Status: COMPLETED | OUTPATIENT
Start: 2017-10-23 | End: 2017-10-23

## 2017-10-23 RX ORDER — SODIUM CHLORIDE 9 MG/ML
INJECTION, SOLUTION INTRAVENOUS CONTINUOUS
Status: DISCONTINUED | OUTPATIENT
Start: 2017-10-23 | End: 2017-10-23 | Stop reason: HOSPADM

## 2017-10-23 RX ORDER — SODIUM CHLORIDE 9 MG/ML
INJECTION, SOLUTION INTRAVENOUS CONTINUOUS
Status: DISCONTINUED | OUTPATIENT
Start: 2017-10-23 | End: 2017-10-24 | Stop reason: HOSPADM

## 2017-10-23 RX ORDER — AMLODIPINE BESYLATE 5 MG/1
5 TABLET ORAL DAILY
Status: DISCONTINUED | OUTPATIENT
Start: 2017-10-23 | End: 2017-10-24 | Stop reason: HOSPADM

## 2017-10-23 RX ORDER — ASPIRIN 81 MG/1
81 TABLET, CHEWABLE ORAL DAILY
Status: DISCONTINUED | OUTPATIENT
Start: 2017-10-24 | End: 2017-10-24 | Stop reason: HOSPADM

## 2017-10-23 RX ORDER — HEPARIN SODIUM 1000 [USP'U]/ML
4000 INJECTION, SOLUTION INTRAVENOUS; SUBCUTANEOUS PRN
Status: DISCONTINUED | OUTPATIENT
Start: 2017-10-23 | End: 2017-10-23 | Stop reason: ALTCHOICE

## 2017-10-23 RX ORDER — ONDANSETRON 2 MG/ML
4 INJECTION INTRAMUSCULAR; INTRAVENOUS EVERY 6 HOURS PRN
Status: DISCONTINUED | OUTPATIENT
Start: 2017-10-23 | End: 2017-10-24 | Stop reason: HOSPADM

## 2017-10-23 RX ADMIN — HEPARIN SODIUM: 1000 INJECTION, SOLUTION INTRAVENOUS; SUBCUTANEOUS at 08:32

## 2017-10-23 RX ADMIN — LISINOPRIL 20 MG: 20 TABLET ORAL at 16:02

## 2017-10-23 RX ADMIN — TAMSULOSIN HYDROCHLORIDE 0.4 MG: 0.4 CAPSULE ORAL at 21:01

## 2017-10-23 RX ADMIN — NITROGLYCERIN 5 MCG/MIN: 20 INJECTION INTRAVENOUS at 06:32

## 2017-10-23 RX ADMIN — LISINOPRIL 20 MG: 20 TABLET ORAL at 15:59

## 2017-10-23 RX ADMIN — METOPROLOL TARTRATE 12.5 MG: 25 TABLET ORAL at 21:01

## 2017-10-23 RX ADMIN — SODIUM CHLORIDE: 9 INJECTION, SOLUTION INTRAVENOUS at 03:29

## 2017-10-23 RX ADMIN — SODIUM CHLORIDE 500 ML: 9 INJECTION, SOLUTION INTRAVENOUS at 01:56

## 2017-10-23 RX ADMIN — SODIUM CHLORIDE: 9 INJECTION, SOLUTION INTRAVENOUS at 11:30

## 2017-10-23 RX ADMIN — ONDANSETRON 4 MG: 2 INJECTION INTRAMUSCULAR; INTRAVENOUS at 04:39

## 2017-10-23 RX ADMIN — HEPARIN SODIUM AND DEXTROSE 1000 UNITS/HR: 10000; 5 INJECTION INTRAVENOUS at 08:30

## 2017-10-23 RX ADMIN — SODIUM CHLORIDE: 9 INJECTION, SOLUTION INTRAVENOUS at 16:05

## 2017-10-23 RX ADMIN — AMLODIPINE BESYLATE 5 MG: 5 TABLET ORAL at 16:03

## 2017-10-23 RX ADMIN — APIXABAN 2.5 MG: 2.5 TABLET, FILM COATED ORAL at 21:01

## 2017-10-23 RX ADMIN — SIMVASTATIN 20 MG: 20 TABLET, FILM COATED ORAL at 21:01

## 2017-10-23 ASSESSMENT — PAIN DESCRIPTION - ONSET: ONSET: SUDDEN

## 2017-10-23 ASSESSMENT — PAIN DESCRIPTION - LOCATION: LOCATION: CHEST

## 2017-10-23 ASSESSMENT — PAIN DESCRIPTION - PAIN TYPE: TYPE: ACUTE PAIN

## 2017-10-23 ASSESSMENT — PAIN DESCRIPTION - ORIENTATION: ORIENTATION: LEFT

## 2017-10-23 ASSESSMENT — PAIN DESCRIPTION - FREQUENCY: FREQUENCY: INTERMITTENT

## 2017-10-23 ASSESSMENT — PAIN SCALES - GENERAL: PAINLEVEL_OUTOF10: 1

## 2017-10-23 ASSESSMENT — PAIN DESCRIPTION - DESCRIPTORS: DESCRIPTORS: PATIENT UNABLE TO DESCRIBE

## 2017-10-23 NOTE — PROCEDURES
[facility]        Date:   10/23/2017  Patient name: Kip Rae  Date of admission:  10/23/2017  5:51 AM  MRN:   [id number]  YOB: 1933    CARDIAC CATHETERIZATION    Operators:  Primary: Dr Sampson Hare (Attending Physician)  Assistant: Latia Diaz (Cardiovascular Fellow)    Indications for cath: Unstable Angina    Procedure performed: Left heart catheterization, selective coronary angiography    Catheters used: JL4, JR4    Access: Right Femoral artery     Procedure: After informed consent was obtained with explanation of the risks and benefits, patient was brought to the cath lab. The right groin were prepped and draped in sterile fashion. 1% lidocaine was used for local block. The Femoral artery was cannulated with 6  Fr sheath with brisk arterial blood return. The side port was frequently flushed and aspirated with normal saline. Findings:  LMCA: Distal 50% stenosis    LAD: 100% proximal stenosis  LIMA - LAD is patent  SVG arm of jump graft to D1: Patent with 25% stenosis    LCx: 100% proximal  SVG arm of jump graft to OM is patent with < 20% stenosis    RCA: Mid 99% stenosis  SVG - PDA is patent and filling PL branch well      The LV gram was not performed due to increase in Cr. Total Contrast used was 80 ml    Conclusions:  Patent all grafts   LIMA-LAD, SVG-D1-Om1, and SVG-PDA      Recommendations    Post cath protocol   OK to resume A.  Fib anticoagulation  Changed Xeralto to Eliquis due to kidney dysfunction      Electronically signed by Mey Brooks MD on 10/23/2017 at 10:58 AM  Cardiology Fellow       I have reviewed the case with resident / fellow  I have examined the patient personally  Agree with treatment plan, correction innotes was made as appropriate, and discussed final arrangement based on  my evaluation and exam  Risk and benefit of procedure explained     Procedure was performed by me, with all aspect of the procedure being done using standard

## 2017-10-23 NOTE — H&P
DAILY    Allergies:    Review of patient's allergies indicates no known allergies. Social History:    reports that he quit smoking about 12 years ago. His smoking use included Cigarettes. He smoked 0.50 packs per day. He has never used smokeless tobacco. He reports that he drinks alcohol. He reports that he does not use drugs. Family History:   family history includes Cancer in his sister and sister. REVIEW OF SYSTEMS:  See HPI and problem list; otherwise no other new complaints with respect to HEENT, neck, pulmonary, coronary, GI, , endocrine, musculoskeletal, immune system/connective tissue disease, hematologic, neuropsych, skin, lymphatics, or malignancies. Code status discussed with patient/family--wishes for DNR-CCA at this time.     PHYSICAL EXAM:  Vitals:  BP (!) 113/53   Pulse 73   Temp 98.5 °F (36.9 °C) (Tympanic)   Resp (!) 33   Ht 5' 11\" (1.803 m)   Wt 226 lb 6.4 oz (102.7 kg)   SpO2 95%   BMI 31.58 kg/m²     General: sleeping, awakens easily to verbal stimuli and remains alert through the conversation, but c/o feeling tired and wanting to sleep, alert and cooperative  HEENT: Mucosa Pink, Moist, EMOI, Normocephalic and Atraumatic  Neck: Supple, Carotid Pulses Present, No Masses, Tenderness, Nodularity and No Lymphadenopathy  Chest/Lungs: Clear to Auscultation without Rales, Rhonchi, or Wheezes, Prolonged Expiratory Phase and Distant Breath Sounds  Cardiac: Regular Rate and Rhythm and Systolic Murmur Present  GI/Abdomen: soft, no guarding or rebound tenderness, hyperactive bowel sounds, c/o mild epigastric discomfort and upper abdominal cramping with nausea, No Masses and No Tenderness  : Not examined  EXT/Skin: mild non-pitting edema BLE, No Cyanosis, No Clubbing and No rash  Neuro: generalized fatigue, Alert and Oriented, to Person, to Time, to Place, to Situation and No Localizing Signs/Symptoms      LABS:    CBC with Differential:    Lab Results   Component Value Date    WBC 12.0 10/22/2017    RBC 5.13 10/22/2017    HGB 15.5 10/22/2017    HCT 46.2 10/22/2017     10/22/2017    MCV 90.1 10/22/2017    MCH 30.2 10/22/2017    MCHC 33.6 10/22/2017    RDW 16.0 10/22/2017    LYMPHOPCT 13 10/22/2017    MONOPCT 6 10/22/2017    MYELOPCT 2 11/25/2014    BASOPCT 1 10/22/2017    MONOSABS 0.70 10/22/2017    LYMPHSABS 1.60 10/22/2017    EOSABS 0.00 10/22/2017    BASOSABS 0.10 10/22/2017    DIFFTYPE NOT REPORTED 10/22/2017     CMP:    Lab Results   Component Value Date     10/22/2017    K 4.0 10/22/2017    CL 98 10/22/2017    CO2 29 10/22/2017    BUN 20 10/22/2017    CREATININE 1.44 10/22/2017    GFRAA 57 10/22/2017    LABGLOM 47 10/22/2017    GLUCOSE 118 10/22/2017    PROT 6.7 01/27/2015    LABALBU 3.7 01/27/2015    CALCIUM 9.8 10/22/2017    BILITOT 0.58 01/27/2015    ALKPHOS 78 01/27/2015    AST 22 01/27/2015    ALT 13 01/27/2015     D-Dimer [[id number]] (Abnormal) Collected: 10/22/17 0835 Updated: 10/22/17 0914 Specimen Source: Blood D-Dimer, Quant 2450 (H) ng/mL      ASSESSMENT:      Patient Active Problem List   Diagnosis    Hypertension    Cerebrovascular disease    BPH (benign prostatic hyperplasia)    Iliac artery aneurysm, right ([address]    AAA (abdominal aortic aneurysm) without rupture ([address]    CAD (coronary artery disease), native coronary artery    S/P CABG (coronary artery bypass graft)    Dysphagia    [facility])    Dyspnea    Atrial fibrillation ([address]    Urinary retention with incomplete bladder emptying    Cellulitis of right lower extremity    Tobacco abuse    HLD (hyperlipidemia)    Hyperglycemia    Renal insufficiency    Sore throat    Esophageal candidiasis (HCC)    ARF (acute renal failure) ([facility])    Cataract    Hyperopia of both eyes with astigmatism and presbyopia       REANNA Score: 4-5  HEART Score: 6-7    PLAN:    1.  Chest pain--ACS regimen, telemetry monitoring, 2D echo in AM, cardiology consult, AM lipid panel, con't Xarelto, wean nitro IV gtt according to chest pain  2. HTN--if chest pain free, wean nitro IV gtt to maintain sbp ~160  3. Elevated D. Dimer--con't Xarelto---given CKD would consider VQ scan if signs of respiratory distress/worsening dypsnea  4. Home medications reviewed  5.  See orders     Note that over 50 minutes was spent in evaluation of the patient, review of the chart and pertinent records, discussion with family/staff, etc    Evelyne HANEYC, FNP-BC  8:22 PM  10/22/2017

## 2017-10-23 NOTE — CONSULTS
TAKE 1 CAPSULE DAILY 5/8/17   Ankita Mcgrath MD   metoprolol tartrate (LOPRESSOR) 25 MG tablet Take 0.5 tablets by mouth nightly 5/4/17   Donta Yi MD   bethanechol (URECHOLINE) 25 MG tablet Take 1 tablet by mouth every 6 hours 3/23/17   Ankita Mcgrath MD   hydrochlorothiazide (HYDRODIURIL) 25 MG tablet TAKE 1 TABLET DAILY 1/9/17   Ankita Mcgrath MD   amLODIPine (NORVASC) 10 MG tablet Take 1 tablet by mouth daily 5/4/16   Ysabel Pearce MD   amiodarone (CORDARONE) 200 MG tablet Take 1 tablet by mouth daily. 1/7/15 5/4/16  Ysabel Pearce MD   aspirin 81 MG tablet Take 81 mg by mouth daily. Historical Provider, MD       Allergies:  Review of patient's allergies indicates no known allergies. Social History:   reports that he quit smoking about 12 years ago. His smoking use included Cigarettes. He smoked 0.50 packs per day. He has never used smokeless tobacco. He reports that he drinks alcohol. He reports that he does not use drugs. Family History: family history includes Cancer in his sister and sister. No h/o sudden cardiac death. No for premature CAD    REVIEW OF SYSTEMS:    · Constitutional: there has been no unanticipated weight loss. There's been No change in energy level, No change in activity level. · Eyes: No visual changes or diplopia. No scleral icterus. · ENT: No Headaches, hearing loss or vertigo. No mouth sores or sore throat. · Cardiovascular: Yes chest pain, Yes dyspnea on exertion, No palpitations or No loss of consciousness. No cough, hemoptysis, No pleuritic pain, or phlebitis. · Respiratory: No cough or wheezing, no sputum production. No hematemesis. · Gastrointestinal: No abdominal pain, appetite loss, blood in stools. No change in bowel or bladder habits. · Genitourinary: No dysuria, trouble voiding, or hematuria. · Musculoskeletal:  No gait disturbance, No weakness or joint complaints. · Integumentary: No rash or pruritis.   · Neurological: No headache, diplopia, change in muscle strength, numbness or tingling. No change in gait, balance, coordination, mood, affect, memory, mentation, behavior. · Psychiatric: No anxiety, or depression. · Endocrine: No temperature intolerance. No excessive thirst, fluid intake, or urination. No tremor. · Hematologic/Lymphatic: No abnormal bruising or bleeding, blood clots or swollen lymph nodes. · Allergic/Immunologic: No nasal congestion or hives. PHYSICAL EXAM:      BP (!) 151/93   Pulse 74   Temp 98.2 °F (36.8 °C) (Oral)   Resp 14   Ht 5' 10.87\" (1.8 m)   Wt 224 lb 8 oz (101.8 kg)   SpO2 95%   BMI 31.43 kg/m²    Constitutional and General Appearance: alert, cooperative, no distress and appears stated age  HEENT: PERRL, no cervical lymphadenopathy. No masses palpable. Normal oral mucosa  Respiratory:  · Normal excursion and expansion without use of accessory muscles  · Resp Auscultation: Good respiratory effort. No for increased work of breathing. On auscultation: clear to auscultation bilaterally  Cardiovascular:  · The apical impulse is not displaced  · Heart tones are crisp and normal. regular S1 and S2.  · Jugular venous pulsation Normal  · The carotid upstroke is normal in amplitude and contour without delay or bruit  · Peripheral pulses are symmetrical and full  Abdomen:  · No masses or tenderness  · Bowel sounds present  Extremities:  ·  No Cyanosis or Clubbing  ·  Lower extremity edema: No  · Skin: Warm and dry  Neurological:  · Alert and oriented. · Moves all extremities well  · No abnormalities of mood, affect, memory, mentation, or behavior are noted    DATA:    Diagnostics:    EKG: NSR, old anteroseptal, inferior  Cardiac Testing      DONNELL/CV 11/26/14: EF 55% no thrombus or vegetation.  Successful CV to NSR.      CABG 11/17/14: LIMA-LAD, SVG-D1, SVG-OM, SVG-PDA.      CATH 11/13/14: MVD, EF 55%.      Labs:     CBC:   Recent Labs      10/23/17   0437  10/23/17   0812   WBC  7.6  6.8   HGB  12.8*  12.7*   HCT  38.3*

## 2017-10-24 VITALS
WEIGHT: 221 LBS | HEART RATE: 89 BPM | DIASTOLIC BLOOD PRESSURE: 72 MMHG | HEIGHT: 71 IN | TEMPERATURE: 98.4 F | RESPIRATION RATE: 20 BRPM | OXYGEN SATURATION: 98 % | SYSTOLIC BLOOD PRESSURE: 175 MMHG | BODY MASS INDEX: 30.94 KG/M2

## 2017-10-24 LAB
ABSOLUTE EOS #: 0.3 K/UL (ref 0–0.4)
ABSOLUTE IMMATURE GRANULOCYTE: ABNORMAL K/UL (ref 0–0.3)
ABSOLUTE LYMPH #: 1 K/UL (ref 1–4.8)
ABSOLUTE MONO #: 0.7 K/UL (ref 0.1–1.2)
ALBUMIN SERPL-MCNC: 3.2 G/DL (ref 3.5–5.2)
ALBUMIN/GLOBULIN RATIO: 1.1 (ref 1–2.5)
ALP BLD-CCNC: 73 U/L (ref 40–129)
ALT SERPL-CCNC: 8 U/L (ref 5–41)
ANION GAP SERPL CALCULATED.3IONS-SCNC: 11 MMOL/L (ref 9–17)
AST SERPL-CCNC: 12 U/L
BASOPHILS # BLD: 0 %
BASOPHILS ABSOLUTE: 0 K/UL (ref 0–0.2)
BILIRUB SERPL-MCNC: 0.54 MG/DL (ref 0.3–1.2)
BUN BLDV-MCNC: 26 MG/DL (ref 8–23)
BUN/CREAT BLD: ABNORMAL (ref 9–20)
CALCIUM SERPL-MCNC: 8.7 MG/DL (ref 8.6–10.4)
CHLORIDE BLD-SCNC: 105 MMOL/L (ref 98–107)
CHOLESTEROL/HDL RATIO: 2.9
CHOLESTEROL: 118 MG/DL
CO2: 23 MMOL/L (ref 20–31)
CREAT SERPL-MCNC: 1.35 MG/DL (ref 0.7–1.2)
DIFFERENTIAL TYPE: ABNORMAL
EOSINOPHILS RELATIVE PERCENT: 4 %
GFR AFRICAN AMERICAN: >60 ML/MIN
GFR NON-AFRICAN AMERICAN: 50 ML/MIN
GFR SERPL CREATININE-BSD FRML MDRD: ABNORMAL ML/MIN/{1.73_M2}
GFR SERPL CREATININE-BSD FRML MDRD: ABNORMAL ML/MIN/{1.73_M2}
GLUCOSE BLD-MCNC: 96 MG/DL (ref 70–99)
HCT VFR BLD CALC: 39 % (ref 41–53)
HDLC SERPL-MCNC: 41 MG/DL
HEMOGLOBIN: 12.8 G/DL (ref 13.5–17.5)
IMMATURE GRANULOCYTES: ABNORMAL %
INR BLD: 1
LDL CHOLESTEROL: 64 MG/DL (ref 0–130)
LV EF: 53 %
LVEF MODALITY: NORMAL
LYMPHOCYTES # BLD: 15 %
MAGNESIUM: 2.3 MG/DL (ref 1.6–2.6)
MCH RBC QN AUTO: 29.6 PG (ref 26–34)
MCHC RBC AUTO-ENTMCNC: 32.8 G/DL (ref 31–37)
MCV RBC AUTO: 90.2 FL (ref 80–100)
MONOCYTES # BLD: 10 %
PARTIAL THROMBOPLASTIN TIME: 27.1 SEC (ref 21.3–31.3)
PDW BLD-RTO: 16.6 % (ref 12.5–15.4)
PLATELET # BLD: 128 K/UL (ref 140–450)
PLATELET ESTIMATE: ABNORMAL
PMV BLD AUTO: 9.2 FL (ref 6–12)
POTASSIUM SERPL-SCNC: 4.1 MMOL/L (ref 3.7–5.3)
PROTHROMBIN TIME: 11.2 SEC (ref 9.4–12.6)
RBC # BLD: 4.32 M/UL (ref 4.5–5.9)
RBC # BLD: ABNORMAL 10*6/UL
SEG NEUTROPHILS: 71 %
SEGMENTED NEUTROPHILS ABSOLUTE COUNT: 4.6 K/UL (ref 1.8–7.7)
SODIUM BLD-SCNC: 139 MMOL/L (ref 135–144)
TOTAL PROTEIN: 6.1 G/DL (ref 6.4–8.3)
TRIGL SERPL-MCNC: 64 MG/DL
VLDLC SERPL CALC-MCNC: NORMAL MG/DL (ref 1–30)
WBC # BLD: 6.6 K/UL (ref 3.5–11)
WBC # BLD: ABNORMAL 10*3/UL

## 2017-10-24 PROCEDURE — 93306 TTE W/DOPPLER COMPLETE: CPT

## 2017-10-24 PROCEDURE — 85730 THROMBOPLASTIN TIME PARTIAL: CPT

## 2017-10-24 PROCEDURE — 6360000002 HC RX W HCPCS: Performed by: NURSE PRACTITIONER

## 2017-10-24 PROCEDURE — 2580000003 HC RX 258: Performed by: INTERNAL MEDICINE

## 2017-10-24 PROCEDURE — 85025 COMPLETE CBC W/AUTO DIFF WBC: CPT

## 2017-10-24 PROCEDURE — 80061 LIPID PANEL: CPT

## 2017-10-24 PROCEDURE — 83735 ASSAY OF MAGNESIUM: CPT

## 2017-10-24 PROCEDURE — 6370000000 HC RX 637 (ALT 250 FOR IP): Performed by: INTERNAL MEDICINE

## 2017-10-24 PROCEDURE — 80053 COMPREHEN METABOLIC PANEL: CPT

## 2017-10-24 PROCEDURE — 6370000000 HC RX 637 (ALT 250 FOR IP): Performed by: NURSE PRACTITIONER

## 2017-10-24 PROCEDURE — 94762 N-INVAS EAR/PLS OXIMTRY CONT: CPT

## 2017-10-24 PROCEDURE — 85610 PROTHROMBIN TIME: CPT

## 2017-10-24 PROCEDURE — 36415 COLL VENOUS BLD VENIPUNCTURE: CPT

## 2017-10-24 RX ORDER — AMLODIPINE BESYLATE 10 MG/1
5 TABLET ORAL DAILY
Qty: 90 TABLET | Refills: 3 | Status: SHIPPED | OUTPATIENT
Start: 2017-10-24 | End: 2017-10-24

## 2017-10-24 RX ORDER — AMLODIPINE BESYLATE 10 MG/1
5 TABLET ORAL DAILY
Qty: 90 TABLET | Refills: 3 | Status: SHIPPED | OUTPATIENT
Start: 2017-10-24 | End: 2017-11-22 | Stop reason: SDUPTHER

## 2017-10-24 RX ADMIN — ASPIRIN 81 MG: 81 TABLET, CHEWABLE ORAL at 10:03

## 2017-10-24 RX ADMIN — LISINOPRIL 20 MG: 20 TABLET ORAL at 10:05

## 2017-10-24 RX ADMIN — HYDRALAZINE HYDROCHLORIDE 10 MG: 20 INJECTION INTRAMUSCULAR; INTRAVENOUS at 04:19

## 2017-10-24 RX ADMIN — APIXABAN 2.5 MG: 2.5 TABLET, FILM COATED ORAL at 10:03

## 2017-10-24 RX ADMIN — Medication 10 ML: at 10:08

## 2017-10-24 RX ADMIN — TAMSULOSIN HYDROCHLORIDE 0.4 MG: 0.4 CAPSULE ORAL at 10:10

## 2017-10-24 RX ADMIN — FUROSEMIDE 40 MG: 40 TABLET ORAL at 10:04

## 2017-10-24 RX ADMIN — AMLODIPINE BESYLATE 5 MG: 5 TABLET ORAL at 10:06

## 2017-10-24 NOTE — DISCHARGE SUMMARY
nursing.     Electronically signed by Jerardo Moore CNP on 10/24/2017 at 2:02 [facility]      [phone]

## 2017-10-24 NOTE — PLAN OF CARE
Problem: Pain:  Goal: Pain level will decrease  Pain level will decrease   Outcome: Ongoing  Patient did not c/o pain this shift, will continue to monitor.   Goal: Control of acute pain  Control of acute pain   Outcome: Ongoing    Goal: Control of chronic pain  Control of chronic pain   Outcome: Ongoing

## 2017-10-25 ENCOUNTER — TELEPHONE (OUTPATIENT)
Dept: CARDIOLOGY | Age: 82
End: 2017-10-25

## 2017-10-25 ENCOUNTER — TELEPHONE (OUTPATIENT)
Dept: PHARMACY | Facility: CLINIC | Age: 82
End: 2017-10-25

## 2017-10-25 ENCOUNTER — CARE COORDINATION (OUTPATIENT)
Dept: CASE MANAGEMENT | Age: 82
End: 2017-10-25

## 2017-10-25 NOTE — LETTER
[address]   [address], [place]  Phone: [phone], option 7  Fax: [place]  20. 1964 [address] [facility] [address]           10/26/17     Dear Taisha Alves,    We tried to reach you recently, after your hospital stay, to review your medications. I was unable to reach you on the telephone. We understand that medications can be confusing after a hospital stay. If you are interested in a pharmacist reviewing your medications, please call [phone] option #7. If we do not hear from you after 2 weeks, we will assume you do not have questions or concerns.          Sincerely,   Rama Dash, PharmD  [address]  Phone: [phone], option 7

## 2017-10-25 NOTE — CARE COORDINATION
appointments are scheduled.   Given [facility] contact information    Follow Up: 11/3, 11/22  Future Appointments  Date Time Provider Tyron Inna   11/3/2017 11:20 AM [facility]   11/22/2017 10:30 AM MD NEHEMIAH Noel [facility]     Ryan Schaffer RN

## 2017-10-26 NOTE — ADDENDUM NOTE
Encounter addended by: Florinda Louie on: 10/26/2017 11:26 AM<BR>    Actions taken: Letter status changed

## 2017-10-29 LAB
CULTURE: NORMAL
Lab: NORMAL
SPECIMEN DESCRIPTION: NORMAL
STATUS: NORMAL

## 2017-10-30 ENCOUNTER — CARE COORDINATION (OUTPATIENT)
Dept: CASE MANAGEMENT | Age: 82
End: 2017-10-30

## 2017-11-03 ENCOUNTER — TELEPHONE (OUTPATIENT)
Dept: CARDIOLOGY | Age: 82
End: 2017-11-03

## 2017-11-03 ENCOUNTER — OFFICE VISIT (OUTPATIENT)
Dept: FAMILY MEDICINE CLINIC | Age: 82
End: 2017-11-03
Payer: MEDICARE

## 2017-11-03 DIAGNOSIS — R07.9 CHEST PAIN, UNSPECIFIED TYPE: Primary | ICD-10-CM

## 2017-11-03 DIAGNOSIS — N40.0 BENIGN PROSTATIC HYPERPLASIA, UNSPECIFIED WHETHER LOWER URINARY TRACT SYMPTOMS PRESENT: ICD-10-CM

## 2017-11-03 DIAGNOSIS — I10 ESSENTIAL HYPERTENSION: ICD-10-CM

## 2017-11-03 DIAGNOSIS — I48.91 ATRIAL FIBRILLATION, UNSPECIFIED TYPE (HCC): ICD-10-CM

## 2017-11-03 DIAGNOSIS — I50.9 CONGESTIVE HEART FAILURE, UNSPECIFIED CONGESTIVE HEART FAILURE CHRONICITY, UNSPECIFIED CONGESTIVE HEART FAILURE TYPE: ICD-10-CM

## 2017-11-03 DIAGNOSIS — I10 ESSENTIAL HYPERTENSION: Primary | ICD-10-CM

## 2017-11-03 PROCEDURE — 99495 TRANSJ CARE MGMT MOD F2F 14D: CPT | Performed by: NURSE PRACTITIONER

## 2017-11-03 RX ORDER — TAMSULOSIN HYDROCHLORIDE 0.4 MG/1
CAPSULE ORAL
Qty: 90 CAPSULE | Refills: 1 | Status: SHIPPED | OUTPATIENT
Start: 2017-11-03 | End: 2018-05-02 | Stop reason: SDUPTHER

## 2017-11-03 RX ORDER — AMLODIPINE BESYLATE 10 MG/1
5 TABLET ORAL DAILY
Qty: 90 TABLET | Refills: 3 | Status: CANCELLED | OUTPATIENT
Start: 2017-11-03

## 2017-11-03 RX ORDER — LISINOPRIL 20 MG/1
20 TABLET ORAL DAILY
Qty: 90 TABLET | Refills: 1 | Status: ON HOLD | OUTPATIENT
Start: 2017-11-03 | End: 2018-01-01 | Stop reason: HOSPADM

## 2017-11-03 RX ORDER — FUROSEMIDE 40 MG/1
TABLET ORAL
Qty: 90 TABLET | Refills: 1 | Status: ON HOLD | OUTPATIENT
Start: 2017-11-03 | End: 2018-01-01 | Stop reason: HOSPADM

## 2017-11-03 ASSESSMENT — PATIENT HEALTH QUESTIONNAIRE - PHQ9
SUM OF ALL RESPONSES TO PHQ9 QUESTIONS 1 & 2: 0
2. FEELING DOWN, DEPRESSED OR HOPELESS: 0
SUM OF ALL RESPONSES TO PHQ QUESTIONS 1-9: 0
1. LITTLE INTEREST OR PLEASURE IN DOING THINGS: 0

## 2017-11-03 NOTE — TELEPHONE ENCOUNTER
Rainer Rivera's office called on behalf of the patient requesting new Rx for Lopressor 25mg take half tab nightly, Amlodipine 10mg half tab daily and Eliquis 2.5mg BID. Last ov 5/4/2016  Next ov 11/22/2017  Patient fills prescriptions at [facility]. Pt has enough right now to wait for mail order pharmacy.

## 2017-11-03 NOTE — PROGRESS NOTES
Post-Discharge Transitional Care Management Services      Teofilo Rankin   YOB: 1933    Date of Office Visit:  11/3/2017  Date of Hospital Admission: 10/23/17  Date of Hospital Discharge: 10/24/17  [facility] Risk Score [risk of hospital readmission >=10  medium risk (chance of readmission ~ 12%) >14  high risk (chance of readmission ~18%)]:Risk Score: 10.75    Care management risk score Rising risk (score 2-5) and Complex Care (Scores >=6): 1       Patient Active Problem List   Diagnosis    Hypertension    Cerebrovascular disease    BPH (benign prostatic hyperplasia)    Iliac artery aneurysm, right ([address]    AAA (abdominal aortic aneurysm) without rupture ([address]    CAD (coronary artery disease), native coronary artery    S/P CABG (coronary artery bypass graft)    Dysphagia    [facility]) ([facility])    Dyspnea    Atrial fibrillation ([address]    Urinary retention with incomplete bladder emptying    Cellulitis of right lower extremity    Tobacco abuse    HLD (hyperlipidemia)    Hyperglycemia    Renal insufficiency    Sore throat    Esophageal candidiasis ([address]    ARF (acute renal failure) ([facility])    Cataract    Hyperopia of both eyes with astigmatism and presbyopia    Chest pain       No Known Allergies    Medications listed as ordered at the time of discharge from hospital   Ronaldo LAW   Home Medication Instructions ENZO:    Printed on:11/04/17 8407   Medication Information                      amLODIPine (NORVASC) 10 MG tablet  Take 0.5 tablets by mouth daily             apixaban (ELIQUIS) 2.5 MG TABS tablet  Take 1 tablet by mouth 2 times daily             aspirin 81 MG tablet  Take 81 mg by mouth daily.              bethanechol (URECHOLINE) 25 MG tablet  Take 1 tablet by mouth every 6 hours             furosemide (LASIX) 40 MG tablet  TAKE 1 TABLET DAILY             lisinopril (PRINIVIL;ZESTRIL) 20 MG tablet  Take 1 tablet by mouth daily MUST HAVE note and vitals reviewed. Assessment/Plan:  Myra Pepe was seen today for follow-up from hospital.    Diagnoses and all orders for this visit:    Chest pain, unspecified type    Essential hypertension  -     lisinopril (PRINIVIL;ZESTRIL) 20 MG tablet; Take 1 tablet by mouth daily MUST HAVE APPOINTMENT WITH DR. Mildred Alberto FOR ANY ADDITIONAL REFILLS!! Benign prostatic hyperplasia, unspecified whether lower urinary tract symptoms present  -     tamsulosin (FLOMAX) 0.4 MG capsule; TAKE 1 CAPSULE DAILY    Congestive heart failure, unspecified congestive heart failure chronicity, unspecified congestive heart failure type (HCC)  -     furosemide (LASIX) 40 MG tablet; TAKE 1 TABLET DAILY    Other orders  -     Cancel: amLODIPine (NORVASC) 10 MG tablet; Take 0.5 tablets by mouth daily  -     Cancel: apixaban (ELIQUIS) 2.5 MG TABS tablet;  Take 1 tablet by mouth 2 times daily      Pt is stable upon examination today   Will continue current medications  [facility]  Telephone call was made to cardiology for refills for his lopressor and eliquis as those are filled by cardiology   Will assist patient to make an appt with Dr. Robert Chan in about 4-6 months for a routine appt  Pt is to keep his appt with cardiology in 3 weeks  Pt to return PRN     Medical Decision Making: moderate complexity

## 2017-11-04 VITALS
OXYGEN SATURATION: 98 % | HEIGHT: 70 IN | BODY MASS INDEX: 31.92 KG/M2 | SYSTOLIC BLOOD PRESSURE: 152 MMHG | DIASTOLIC BLOOD PRESSURE: 86 MMHG | WEIGHT: 223 LBS | HEART RATE: 60 BPM

## 2017-11-04 ASSESSMENT — ENCOUNTER SYMPTOMS
VOMITING: 0
DIARRHEA: 0
NAUSEA: 0
COUGH: 0
WHEEZING: 0
SHORTNESS OF BREATH: 0

## 2017-11-06 RX ORDER — AMLODIPINE BESYLATE 5 MG/1
5 TABLET ORAL DAILY
Qty: 90 TABLET | Refills: 3 | Status: SHIPPED | OUTPATIENT
Start: 2017-11-06 | End: 2019-02-06 | Stop reason: SDUPTHER

## 2017-11-06 RX ORDER — METOPROLOL SUCCINATE 25 MG/1
12.5 TABLET, EXTENDED RELEASE ORAL NIGHTLY
Qty: 45 TABLET | Refills: 3 | Status: SHIPPED | OUTPATIENT
Start: 2017-11-06 | End: 2018-10-16 | Stop reason: SDUPTHER

## 2017-11-13 ENCOUNTER — NURSE ONLY (OUTPATIENT)
Dept: LAB | Age: 82
End: 2017-11-13
Payer: MEDICARE

## 2017-11-13 DIAGNOSIS — Z23 NEED FOR IMMUNIZATION AGAINST INFLUENZA: Primary | ICD-10-CM

## 2017-11-13 PROCEDURE — G0008 ADMIN INFLUENZA VIRUS VAC: HCPCS | Performed by: FAMILY MEDICINE

## 2017-11-13 PROCEDURE — 90662 IIV NO PRSV INCREASED AG IM: CPT | Performed by: FAMILY MEDICINE

## 2017-11-22 ENCOUNTER — OFFICE VISIT (OUTPATIENT)
Dept: CARDIOLOGY | Age: 82
End: 2017-11-22
Payer: MEDICARE

## 2017-11-22 VITALS
HEIGHT: 70 IN | BODY MASS INDEX: 32.07 KG/M2 | WEIGHT: 224 LBS | HEART RATE: 53 BPM | DIASTOLIC BLOOD PRESSURE: 72 MMHG | SYSTOLIC BLOOD PRESSURE: 146 MMHG

## 2017-11-22 DIAGNOSIS — Z95.1 S/P CABG (CORONARY ARTERY BYPASS GRAFT): ICD-10-CM

## 2017-11-22 DIAGNOSIS — I48.91 ATRIAL FIBRILLATION, UNSPECIFIED TYPE (HCC): Primary | ICD-10-CM

## 2017-11-22 DIAGNOSIS — I10 ESSENTIAL HYPERTENSION: ICD-10-CM

## 2017-11-22 PROCEDURE — 99214 OFFICE O/P EST MOD 30 MIN: CPT | Performed by: INTERNAL MEDICINE

## 2017-11-22 PROCEDURE — 93000 ELECTROCARDIOGRAM COMPLETE: CPT | Performed by: INTERNAL MEDICINE

## 2017-11-22 NOTE — PROGRESS NOTES
Today's Date: 11/22/2017  Patient's Name: [facility]  Patient's age: 80 y.o., 9/18/1933    Subjective: The patient is a 80y.o. year old, , male is in the office for F/U post [facility] admission for chest pain and cardiac cath. He is doing quit well from cardiac stand point. He freddy any chest pain. No orthopnea or PND. Freddy any palpitation, dizziness or syncope. Past Medical History:   has a past medical history of Aneurysm of infrarenal abdominal aorta ([facility] [address] Atrial fibrillation ([facility] [address] BPH (benign prostatic hyperplasia); CAD (coronary artery disease); Cerebrovascular disease; DVT (deep venous thrombosis) ([facility] [address] Elevated PSA; Hyperlipidemia; Hyperopia with astigmatism and presbyopia; and Hypertension. Past Surgical History:   has a past surgical history that includes back surgery; knee surgery (Left); Coronary artery bypass graft (11/17/14); and Cardiac surgery (11-17-14). Home Medications:  Prior to Admission medications    Medication Sig Start Date End Date Taking?  Authorizing Provider   apixaban (ELIQUIS) 2.5 MG TABS tablet Take 1 tablet by mouth 2 times daily 11/6/17  Yes Bhupidner Foreman MD   amLODIPine [facility]) 5 MG tablet Take 1 tablet by mouth daily 11/6/17  Yes Bhupinder Foreman MD   metoprolol succinate (TOPROL XL) 25 MG extended release tablet Take 0.5 tablets by mouth nightly 11/6/17  Yes Bhupinder Foreman MD   furosemide (LASIX) 40 MG tablet TAKE 1 TABLET DAILY 11/3/17  Yes Shmuel Baumann NP   lisinopril (PRINIVIL;ZESTRIL) 20 MG tablet Take 1 tablet by mouth daily MUST HAVE APPOINTMENT WITH DR. Leti Henson FOR ANY ADDITIONAL REFILLS!! 11/3/17  Yes Shmuel Baumann NP   tamsulosin (FLOMAX) 0.4 MG capsule TAKE 1 CAPSULE DAILY 11/3/17  Yes Shmuel Baumann NP   simvastatin (ZOCOR) 20 MG tablet TAKE 1 TABLET NIGHTLY 8/28/17  Yes Jose Grider MD   metoprolol tartrate (LOPRESSOR) 25 MG tablet Take 0.5 tablets by mouth nightly 5/4/17  Yes Av Solorio MD   bethanechol (URECHOLINE) 25 MG tablet Take 1 tablet by mouth every 6 hours 3/23/17  Yes Davin Wilhelm MD   aspirin 81 MG tablet Take 81 mg by mouth daily. Yes Historical Provider, MD   amiodarone (CORDARONE) 200 MG tablet Take 1 tablet by mouth daily. 1/7/15 5/4/16  Anastasia Cortez MD       Allergies:  Review of patient's allergies indicates no known allergies. Social History:   reports that he quit smoking about 12 years ago. His smoking use included Cigarettes. He smoked 0.50 packs per day. He has never used smokeless tobacco. He reports that he drinks alcohol. He reports that he does not use drugs. Review of Systems:  · Constitutional: there has been no unanticipated weight loss. There's been No change in energy level, No change in activity level. · Eyes: No visual changes or diplopia. No scleral icterus. · ENT: No Headaches, hearing loss or vertigo. No mouth sores or sore throat. · Cardiovascular: As above. · Respiratory: As above. · Gastrointestinal: No abdominal pain, appetite loss, blood in stools. No change in bowel or bladder habits. · Genitourinary: No dysuria, trouble voiding, or hematuria. · Musculoskeletal:  No gait disturbance, No weakness or joint complaints. · Integumentary: No rash or pruritis. · Neurological: No headache, diplopia, change in muscle strength, numbness or tingling. No change in gait, balance, coordination, mood, affect, memory, mentation, behavior. · Psychiatric: No anxiety, or depression. · Endocrine: No temperature intolerance. No excessive thirst, fluid intake, or urination. No tremor. · Hematologic/Lymphatic: No abnormal bruising or bleeding, blood clots or swollen lymph nodes. · Allergic/Immunologic: No nasal congestion or hives.     Physical Exam:  BP (!) 146/72   Pulse 53   Ht 5' 10\" (1.778 m)   Wt 224 lb (101.6 kg)   BMI 32.14 kg/m²   Constitutional and General Appearance: alert, cooperative, no distress and appears stated age  [de-identified]: PERRL, no cervical lymphadenopathy. No masses palpable. Normal oral mucosa  Respiratory:  · Normal excursion and expansion without use of accessory muscles  · Resp Auscultation: Good respiratory effort. No for increased work of breathing. On auscultation: clear to auscultation bilaterally. Pain is reproducible. Cardiovascular:  · The apical impulse is not displaced  · Heart tones are crisp and normal. regular S1 and S2.  · Jugular venous pulsation Normal  · The carotid upstroke is normal in amplitude and contour without delay or bruit  · Peripheral pulses are symmetrical and full   Abdomen:   · No masses or tenderness  · Bowel sounds present  Extremities:  ·  No Cyanosis or Clubbing  ·  Lower extremity edema: No  ·  Skin: Warm and dry  Neurological:  · Alert and oriented. · Moves all extremities well  · No abnormalities of mood, affect, memory, mentation, or behavior are noted    Cardiac Data:  Diagnostics:    EKG: sinus bradycardia, unchanged from previous tracings.     Labs:     FASTING LIPID PANEL:  Lab Results   Component Value Date    HDL 41 10/24/2017    TRIG 64 10/24/2017     IMPRESSION:    Patient Active Problem List   Diagnosis    Hypertension    Cerebrovascular disease    BPH (benign prostatic hyperplasia)    Iliac artery aneurysm, right ([address]    AAA (abdominal aortic aneurysm) without rupture ([address]    CAD (coronary artery disease), native coronary artery    S/P CABG (coronary artery bypass graft)    Dysphagia    CH[facility]congestive [facility] failure) [facility])    Dyspnea    Atrial fibrillation ([address]    Urinary retention with incomplete bladder emptying    Cellulitis of right lower extremity    Tobacco abuse    HLD (hyperlipidemia)    Hyperglycemia    Renal insufficiency    Sore throat    Esophageal candidiasis (HCC)    ARF (acute renal failure) ([facility])    Cataract    Hyperopia of both eyes with astigmatism and presbyopia    Chest pain     Cardiac cath 10/23/2017:  Patent all grafts   LIMA-LAD, SVG-D1-Om1, and SVG-PDA     Assessment / Acute Cardiac Problems:     1-Atrial fibrillation post CABG: S/P DONNELL/CV: remains in NSR and sinus bradycardia. 2-Acute diastolic CHF: resolved. 3-Preserved LV systolic function. 4- MV CAD S/P CABG: repeat cardiac cath 10/2017 as above with patent grafts: Stable with no signs of symptoms of ischemia. 5-Iliac artery aneurysm: S/P repair by Dr. Constantin West. 6-Renal insufficiency last creatinine 1.3  7-HTN: Better controlled. 8-HLP: on Statin. 9-Obesity. Plan of Treatment:     1-Continue current medical treatment with ASA, Eliquis, BB, Diuretics, ACEi, CCB, Amiodarone and statin. 2-Aggressive risk factors modifications. 3-Diet, exercise and loose weight. 4-F/U in 6 months.     Electronically signed by Hilda Maldonado MD on 11/22/2017 at 10:31 AM  [facility]  [phone]

## 2017-11-28 RX ORDER — BETHANECHOL CHLORIDE 25 MG/1
25 TABLET ORAL EVERY 6 HOURS
Qty: 120 TABLET | Refills: 3 | Status: SHIPPED | OUTPATIENT
Start: 2017-11-28 | End: 2018-08-01 | Stop reason: SDUPTHER

## 2017-11-28 RX ORDER — BETHANECHOL CHLORIDE 25 MG/1
25 TABLET ORAL 3 TIMES DAILY
Qty: 90 TABLET | Refills: 3 | Status: CANCELLED | OUTPATIENT
Start: 2017-11-28

## 2017-12-27 ENCOUNTER — HOSPITAL ENCOUNTER (EMERGENCY)
Age: 82
Discharge: ANOTHER HOSPITAL | End: 2017-12-27
Attending: EMERGENCY MEDICINE
Payer: MEDICARE

## 2017-12-27 ENCOUNTER — APPOINTMENT (OUTPATIENT)
Dept: GENERAL RADIOLOGY | Age: 82
DRG: 418 | End: 2017-12-27
Attending: INTERNAL MEDICINE
Payer: MEDICARE

## 2017-12-27 ENCOUNTER — APPOINTMENT (OUTPATIENT)
Dept: GENERAL RADIOLOGY | Age: 82
End: 2017-12-27
Payer: MEDICARE

## 2017-12-27 ENCOUNTER — HOSPITAL ENCOUNTER (INPATIENT)
Age: 82
LOS: 5 days | Discharge: HOME | DRG: 418 | End: 2018-01-01
Attending: INTERNAL MEDICINE | Admitting: INTERNAL MEDICINE
Payer: MEDICARE

## 2017-12-27 VITALS
OXYGEN SATURATION: 91 % | SYSTOLIC BLOOD PRESSURE: 208 MMHG | WEIGHT: 220 LBS | RESPIRATION RATE: 27 BRPM | HEIGHT: 70 IN | BODY MASS INDEX: 31.5 KG/M2 | TEMPERATURE: 99 F | DIASTOLIC BLOOD PRESSURE: 88 MMHG | HEART RATE: 92 BPM

## 2017-12-27 DIAGNOSIS — K80.01 CALCULUS OF GALLBLADDER WITH ACUTE CHOLECYSTITIS AND OBSTRUCTION: Primary | ICD-10-CM

## 2017-12-27 DIAGNOSIS — I48.91 ATRIAL FIBRILLATION, UNSPECIFIED TYPE (HCC): ICD-10-CM

## 2017-12-27 DIAGNOSIS — R07.9 CHEST PAIN, UNSPECIFIED TYPE: Primary | ICD-10-CM

## 2017-12-27 PROBLEM — R11.2 NON-INTRACTABLE VOMITING WITH NAUSEA: Status: ACTIVE | Noted: 2017-12-27

## 2017-12-27 PROBLEM — R07.89 ATYPICAL CHEST PAIN: Status: ACTIVE | Noted: 2017-10-22

## 2017-12-27 PROBLEM — I50.32 CHRONIC DIASTOLIC CHF (CONGESTIVE HEART FAILURE) (HCC): Status: ACTIVE | Noted: 2017-12-27

## 2017-12-27 LAB
ABSOLUTE EOS #: 0 K/UL (ref 0–0.4)
ABSOLUTE IMMATURE GRANULOCYTE: ABNORMAL K/UL (ref 0–0.3)
ABSOLUTE LYMPH #: 0.9 K/UL (ref 1–4.8)
ABSOLUTE MONO #: 1.3 K/UL (ref 0.1–1.2)
ANION GAP SERPL CALCULATED.3IONS-SCNC: 13 MMOL/L (ref 9–17)
BASOPHILS # BLD: 1 % (ref 0–2)
BASOPHILS ABSOLUTE: 0.1 K/UL (ref 0–0.2)
BNP INTERPRETATION: ABNORMAL
BUN BLDV-MCNC: 17 MG/DL (ref 8–23)
BUN/CREAT BLD: 15 (ref 9–20)
CALCIUM SERPL-MCNC: 9.6 MG/DL (ref 8.6–10.4)
CHLORIDE BLD-SCNC: 95 MMOL/L (ref 98–107)
CO2: 28 MMOL/L (ref 20–31)
CREAT SERPL-MCNC: 1.1 MG/DL (ref 0.7–1.2)
DIFFERENTIAL TYPE: ABNORMAL
EOSINOPHILS RELATIVE PERCENT: 0 % (ref 1–8)
GFR AFRICAN AMERICAN: >60 ML/MIN
GFR NON-AFRICAN AMERICAN: >60 ML/MIN
GFR SERPL CREATININE-BSD FRML MDRD: ABNORMAL ML/MIN/{1.73_M2}
GFR SERPL CREATININE-BSD FRML MDRD: ABNORMAL ML/MIN/{1.73_M2}
GLUCOSE BLD-MCNC: 146 MG/DL (ref 70–99)
HCT VFR BLD CALC: 47.4 % (ref 41–53)
HEMOGLOBIN: 15.8 G/DL (ref 13.5–17.5)
IMMATURE GRANULOCYTES: ABNORMAL %
LYMPHOCYTES # BLD: 7 % (ref 15–43)
MCH RBC QN AUTO: 30.5 PG (ref 26–34)
MCHC RBC AUTO-ENTMCNC: 33.2 G/DL (ref 31–37)
MCV RBC AUTO: 91.8 FL (ref 80–100)
MONOCYTES # BLD: 9 % (ref 6–14)
MYOGLOBIN: 98 NG/ML (ref 28–72)
PDW BLD-RTO: 14.9 % (ref 11–14.5)
PLATELET # BLD: 148 K/UL (ref 140–450)
PLATELET ESTIMATE: ABNORMAL
PMV BLD AUTO: 8.9 FL (ref 6–12)
POTASSIUM SERPL-SCNC: 4.1 MMOL/L (ref 3.7–5.3)
PRO-BNP: 1057 PG/ML
RBC # BLD: 5.17 M/UL (ref 4.5–5.9)
RBC # BLD: ABNORMAL 10*6/UL
SEG NEUTROPHILS: 83 % (ref 44–74)
SEGMENTED NEUTROPHILS ABSOLUTE COUNT: 11.8 K/UL (ref 1.8–7.7)
SODIUM BLD-SCNC: 136 MMOL/L (ref 135–144)
TROPONIN INTERP: NORMAL
TROPONIN T: <0.03 NG/ML
WBC # BLD: 14.2 K/UL (ref 3.5–11)
WBC # BLD: ABNORMAL 10*3/UL

## 2017-12-27 PROCEDURE — 2060000000 HC ICU INTERMEDIATE R&B

## 2017-12-27 PROCEDURE — 6360000002 HC RX W HCPCS: Performed by: EMERGENCY MEDICINE

## 2017-12-27 PROCEDURE — 84484 ASSAY OF TROPONIN QUANT: CPT

## 2017-12-27 PROCEDURE — 36415 COLL VENOUS BLD VENIPUNCTURE: CPT

## 2017-12-27 PROCEDURE — S0028 INJECTION, FAMOTIDINE, 20 MG: HCPCS | Performed by: INTERNAL MEDICINE

## 2017-12-27 PROCEDURE — 6370000000 HC RX 637 (ALT 250 FOR IP): Performed by: INTERNAL MEDICINE

## 2017-12-27 PROCEDURE — 83880 ASSAY OF NATRIURETIC PEPTIDE: CPT

## 2017-12-27 PROCEDURE — 99285 EMERGENCY DEPT VISIT HI MDM: CPT

## 2017-12-27 PROCEDURE — 71020 XR CHEST STANDARD TWO VW: CPT

## 2017-12-27 PROCEDURE — 85025 COMPLETE CBC W/AUTO DIFF WBC: CPT

## 2017-12-27 PROCEDURE — 99223 1ST HOSP IP/OBS HIGH 75: CPT | Performed by: INTERNAL MEDICINE

## 2017-12-27 PROCEDURE — 96376 TX/PRO/DX INJ SAME DRUG ADON: CPT

## 2017-12-27 PROCEDURE — 2580000003 HC RX 258: Performed by: INTERNAL MEDICINE

## 2017-12-27 PROCEDURE — 96374 THER/PROPH/DIAG INJ IV PUSH: CPT

## 2017-12-27 PROCEDURE — 80048 BASIC METABOLIC PNL TOTAL CA: CPT

## 2017-12-27 PROCEDURE — 93005 ELECTROCARDIOGRAM TRACING: CPT

## 2017-12-27 PROCEDURE — 2500000003 HC RX 250 WO HCPCS: Performed by: EMERGENCY MEDICINE

## 2017-12-27 PROCEDURE — 6360000002 HC RX W HCPCS: Performed by: INTERNAL MEDICINE

## 2017-12-27 PROCEDURE — 6370000000 HC RX 637 (ALT 250 FOR IP): Performed by: EMERGENCY MEDICINE

## 2017-12-27 PROCEDURE — 71010 XR CHEST PORTABLE: CPT

## 2017-12-27 PROCEDURE — 96372 THER/PROPH/DIAG INJ SC/IM: CPT

## 2017-12-27 PROCEDURE — 96375 TX/PRO/DX INJ NEW DRUG ADDON: CPT

## 2017-12-27 PROCEDURE — 83874 ASSAY OF MYOGLOBIN: CPT

## 2017-12-27 PROCEDURE — 2500000003 HC RX 250 WO HCPCS: Performed by: INTERNAL MEDICINE

## 2017-12-27 RX ORDER — METOPROLOL TARTRATE 5 MG/5ML
5 INJECTION INTRAVENOUS ONCE
Status: COMPLETED | OUTPATIENT
Start: 2017-12-27 | End: 2017-12-27

## 2017-12-27 RX ORDER — SODIUM CHLORIDE 0.9 % (FLUSH) 0.9 %
10 SYRINGE (ML) INJECTION EVERY 12 HOURS SCHEDULED
Status: DISCONTINUED | OUTPATIENT
Start: 2017-12-27 | End: 2018-01-01 | Stop reason: HOSPADM

## 2017-12-27 RX ORDER — BISACODYL 10 MG
10 SUPPOSITORY, RECTAL RECTAL DAILY PRN
Status: DISCONTINUED | OUTPATIENT
Start: 2017-12-27 | End: 2018-01-01 | Stop reason: HOSPADM

## 2017-12-27 RX ORDER — SIMVASTATIN 20 MG
20 TABLET ORAL NIGHTLY
Status: DISCONTINUED | OUTPATIENT
Start: 2017-12-27 | End: 2018-01-01 | Stop reason: HOSPADM

## 2017-12-27 RX ORDER — MAGNESIUM SULFATE 1 G/100ML
1 INJECTION INTRAVENOUS PRN
Status: DISCONTINUED | OUTPATIENT
Start: 2017-12-27 | End: 2018-01-01 | Stop reason: HOSPADM

## 2017-12-27 RX ORDER — DOCUSATE SODIUM 100 MG/1
100 CAPSULE, LIQUID FILLED ORAL 2 TIMES DAILY
Status: DISCONTINUED | OUTPATIENT
Start: 2017-12-27 | End: 2018-01-01 | Stop reason: HOSPADM

## 2017-12-27 RX ORDER — HYDROCODONE BITARTRATE AND ACETAMINOPHEN 5; 325 MG/1; MG/1
1 TABLET ORAL EVERY 4 HOURS PRN
Status: DISCONTINUED | OUTPATIENT
Start: 2017-12-27 | End: 2018-01-01 | Stop reason: HOSPADM

## 2017-12-27 RX ORDER — LISINOPRIL 20 MG/1
20 TABLET ORAL DAILY
Status: DISCONTINUED | OUTPATIENT
Start: 2017-12-27 | End: 2017-12-28

## 2017-12-27 RX ORDER — SODIUM CHLORIDE 0.9 % (FLUSH) 0.9 %
10 SYRINGE (ML) INJECTION PRN
Status: DISCONTINUED | OUTPATIENT
Start: 2017-12-27 | End: 2018-01-01 | Stop reason: HOSPADM

## 2017-12-27 RX ORDER — ONDANSETRON 2 MG/ML
4 INJECTION INTRAMUSCULAR; INTRAVENOUS EVERY 6 HOURS PRN
Status: DISCONTINUED | OUTPATIENT
Start: 2017-12-27 | End: 2018-01-01 | Stop reason: HOSPADM

## 2017-12-27 RX ORDER — FUROSEMIDE 40 MG/1
40 TABLET ORAL DAILY
Status: DISCONTINUED | OUTPATIENT
Start: 2017-12-27 | End: 2017-12-28

## 2017-12-27 RX ORDER — NITROGLYCERIN 0.4 MG/1
0.4 TABLET SUBLINGUAL EVERY 5 MIN PRN
Status: DISCONTINUED | OUTPATIENT
Start: 2017-12-27 | End: 2018-01-01 | Stop reason: HOSPADM

## 2017-12-27 RX ORDER — AMLODIPINE BESYLATE 5 MG/1
5 TABLET ORAL DAILY
Status: DISCONTINUED | OUTPATIENT
Start: 2017-12-27 | End: 2018-01-01 | Stop reason: HOSPADM

## 2017-12-27 RX ORDER — POTASSIUM CHLORIDE 7.45 MG/ML
10 INJECTION INTRAVENOUS PRN
Status: DISCONTINUED | OUTPATIENT
Start: 2017-12-27 | End: 2018-01-01 | Stop reason: HOSPADM

## 2017-12-27 RX ORDER — POTASSIUM CHLORIDE 20MEQ/15ML
40 LIQUID (ML) ORAL PRN
Status: DISCONTINUED | OUTPATIENT
Start: 2017-12-27 | End: 2018-01-01 | Stop reason: HOSPADM

## 2017-12-27 RX ORDER — ACETAMINOPHEN 325 MG/1
650 TABLET ORAL EVERY 4 HOURS PRN
Status: DISCONTINUED | OUTPATIENT
Start: 2017-12-27 | End: 2018-01-01 | Stop reason: HOSPADM

## 2017-12-27 RX ORDER — POTASSIUM CHLORIDE 20 MEQ/1
40 TABLET, EXTENDED RELEASE ORAL PRN
Status: DISCONTINUED | OUTPATIENT
Start: 2017-12-27 | End: 2018-01-01 | Stop reason: HOSPADM

## 2017-12-27 RX ORDER — MORPHINE SULFATE 4 MG/ML
4 INJECTION, SOLUTION INTRAMUSCULAR; INTRAVENOUS ONCE
Status: COMPLETED | OUTPATIENT
Start: 2017-12-27 | End: 2017-12-27

## 2017-12-27 RX ORDER — MORPHINE SULFATE 2 MG/ML
2 INJECTION, SOLUTION INTRAMUSCULAR; INTRAVENOUS
Status: DISCONTINUED | OUTPATIENT
Start: 2017-12-27 | End: 2018-01-01 | Stop reason: HOSPADM

## 2017-12-27 RX ORDER — ONDANSETRON 2 MG/ML
4 INJECTION INTRAMUSCULAR; INTRAVENOUS ONCE
Status: COMPLETED | OUTPATIENT
Start: 2017-12-27 | End: 2017-12-27

## 2017-12-27 RX ORDER — MORPHINE SULFATE 4 MG/ML
4 INJECTION, SOLUTION INTRAMUSCULAR; INTRAVENOUS
Status: DISCONTINUED | OUTPATIENT
Start: 2017-12-27 | End: 2018-01-01 | Stop reason: HOSPADM

## 2017-12-27 RX ORDER — TAMSULOSIN HYDROCHLORIDE 0.4 MG/1
0.4 CAPSULE ORAL DAILY
Status: DISCONTINUED | OUTPATIENT
Start: 2017-12-27 | End: 2018-01-01 | Stop reason: HOSPADM

## 2017-12-27 RX ORDER — PROMETHAZINE HYDROCHLORIDE 25 MG/ML
25 INJECTION, SOLUTION INTRAMUSCULAR; INTRAVENOUS ONCE
Status: COMPLETED | OUTPATIENT
Start: 2017-12-27 | End: 2017-12-27

## 2017-12-27 RX ORDER — ASPIRIN 81 MG/1
324 TABLET, CHEWABLE ORAL ONCE
Status: COMPLETED | OUTPATIENT
Start: 2017-12-27 | End: 2017-12-27

## 2017-12-27 RX ORDER — ASPIRIN 81 MG/1
81 TABLET, CHEWABLE ORAL DAILY
Status: DISCONTINUED | OUTPATIENT
Start: 2017-12-28 | End: 2017-12-27 | Stop reason: SDUPTHER

## 2017-12-27 RX ORDER — BETHANECHOL CHLORIDE 25 MG/1
25 TABLET ORAL EVERY 6 HOURS
Status: DISCONTINUED | OUTPATIENT
Start: 2017-12-27 | End: 2018-01-01 | Stop reason: HOSPADM

## 2017-12-27 RX ORDER — ASPIRIN 81 MG/1
81 TABLET ORAL DAILY
Status: DISCONTINUED | OUTPATIENT
Start: 2017-12-27 | End: 2017-12-29

## 2017-12-27 RX ORDER — PROMETHAZINE HYDROCHLORIDE 25 MG/ML
12.5 INJECTION, SOLUTION INTRAMUSCULAR; INTRAVENOUS ONCE
Status: COMPLETED | OUTPATIENT
Start: 2017-12-27 | End: 2017-12-27

## 2017-12-27 RX ADMIN — PROMETHAZINE HYDROCHLORIDE 12.5 MG: 25 INJECTION INTRAMUSCULAR; INTRAVENOUS at 11:18

## 2017-12-27 RX ADMIN — BETHANECHOL CHLORIDE 25 MG: 25 TABLET ORAL at 23:08

## 2017-12-27 RX ADMIN — PROMETHAZINE HYDROCHLORIDE 25 MG: 25 INJECTION INTRAMUSCULAR; INTRAVENOUS at 06:56

## 2017-12-27 RX ADMIN — ONDANSETRON 4 MG: 2 INJECTION INTRAMUSCULAR; INTRAVENOUS at 06:22

## 2017-12-27 RX ADMIN — Medication 10 ML: at 23:17

## 2017-12-27 RX ADMIN — APIXABAN 2.5 MG: 2.5 TABLET, FILM COATED ORAL at 23:08

## 2017-12-27 RX ADMIN — METOPROLOL TARTRATE 12.5 MG: 25 TABLET ORAL at 23:07

## 2017-12-27 RX ADMIN — ONDANSETRON 4 MG: 2 INJECTION INTRAMUSCULAR; INTRAVENOUS at 05:43

## 2017-12-27 RX ADMIN — ONDANSETRON 4 MG: 2 INJECTION, SOLUTION INTRAMUSCULAR; INTRAVENOUS at 18:37

## 2017-12-27 RX ADMIN — SIMVASTATIN 20 MG: 20 TABLET, FILM COATED ORAL at 23:07

## 2017-12-27 RX ADMIN — DOCUSATE SODIUM 100 MG: 100 CAPSULE, LIQUID FILLED ORAL at 23:08

## 2017-12-27 RX ADMIN — METOPROLOL TARTRATE 5 MG: 5 INJECTION, SOLUTION INTRAVENOUS at 07:32

## 2017-12-27 RX ADMIN — ASPIRIN 81 MG 162 MG: 81 TABLET ORAL at 14:56

## 2017-12-27 RX ADMIN — LISINOPRIL 20 MG: 20 TABLET ORAL at 23:20

## 2017-12-27 RX ADMIN — FAMOTIDINE 20 MG: 10 INJECTION, SOLUTION INTRAVENOUS at 23:08

## 2017-12-27 RX ADMIN — AMLODIPINE BESYLATE 5 MG: 5 TABLET ORAL at 23:20

## 2017-12-27 RX ADMIN — MORPHINE SULFATE 4 MG: 4 INJECTION, SOLUTION INTRAMUSCULAR; INTRAVENOUS at 11:18

## 2017-12-27 RX ADMIN — TAMSULOSIN HYDROCHLORIDE 0.4 MG: 0.4 CAPSULE ORAL at 23:20

## 2017-12-27 ASSESSMENT — ENCOUNTER SYMPTOMS
COUGH: 0
EYE PAIN: 0
STRIDOR: 0
WHEEZING: 0
ABDOMINAL PAIN: 0
SORE THROAT: 0
DIARRHEA: 0
VOMITING: 1
EYE DISCHARGE: 0
NAUSEA: 1
CONSTIPATION: 0
SHORTNESS OF BREATH: 0
COLOR CHANGE: 0
EYE REDNESS: 0

## 2017-12-27 ASSESSMENT — PAIN DESCRIPTION - PAIN TYPE: TYPE: ACUTE PAIN

## 2017-12-27 ASSESSMENT — PAIN SCALES - GENERAL
PAINLEVEL_OUTOF10: 4
PAINLEVEL_OUTOF10: 10
PAINLEVEL_OUTOF10: 0

## 2017-12-27 ASSESSMENT — PAIN DESCRIPTION - ORIENTATION: ORIENTATION: MID

## 2017-12-27 ASSESSMENT — PAIN DESCRIPTION - LOCATION: LOCATION: CHEST;ABDOMEN

## 2017-12-27 ASSESSMENT — PAIN DESCRIPTION - DESCRIPTORS: DESCRIPTORS: ACHING

## 2017-12-27 NOTE — ED PROVIDER NOTES
[address] [facility] ED  [address] [facility]  Phone: [phone]        ADDENDUM:      Care of this patient was assumed from Dr. Sima Villanueva. The patient was seen for Chest Pain (0900 12/26/17) and Emesis (0900 12/26/17)  . The patient's initial evaluation and plan have been discussed with the prior provider who initially evaluated the patient. Nursing Notes, Past Medical Hx, Past Surgical Hx, Social Hx, Allergies, and Family Hx were all reviewed. PAST MEDICAL HISTORY    has a past medical history of Aneurysm of infrarenal abdominal aorta ([facility] [address] Atrial fibrillation ([facility] [address] BPH (benign prostatic hyperplasia); CAD (coronary artery disease); Cerebrovascular disease; DVT (deep venous thrombosis) ([facility] [address] Elevated PSA; Hyperlipidemia; Hyperopia with astigmatism and presbyopia; and Hypertension. SURGICAL HISTORY      has a past surgical history that includes back surgery; knee surgery (Left); Coronary artery bypass graft (11/17/14); and Cardiac surgery (11-17-14). CURRENT MEDICATIONS       Previous Medications    AMLODIPINE (NORVASC) 5 MG TABLET    Take 1 tablet by mouth daily    APIXABAN (ELIQUIS) 2.5 MG TABS TABLET    Take 1 tablet by mouth 2 times daily    ASPIRIN 81 MG TABLET    Take 81 mg by mouth daily. BETHANECHOL (URECHOLINE) 25 MG TABLET    Take 1 tablet by mouth every 6 hours    FUROSEMIDE (LASIX) 40 MG TABLET    TAKE 1 TABLET DAILY    LISINOPRIL (PRINIVIL;ZESTRIL) 20 MG TABLET    Take 1 tablet by mouth daily MUST HAVE APPOINTMENT WITH DR. Ree Buerger FOR ANY ADDITIONAL REFILLS!! METOPROLOL SUCCINATE (TOPROL XL) 25 MG EXTENDED RELEASE TABLET    Take 0.5 tablets by mouth nightly    METOPROLOL TARTRATE (LOPRESSOR) 25 MG TABLET    Take 0.5 tablets by mouth nightly    SIMVASTATIN (ZOCOR) 20 MG TABLET    TAKE 1 TABLET NIGHTLY    TAMSULOSIN (FLOMAX) 0.4 MG CAPSULE    TAKE 1 CAPSULE DAILY       ALLERGIES     has No Known Allergies.     FAMILY HISTORY     indicated that his mother is . He indicated that his father is . He indicated that two of his four sisters are . He indicated that only one of his two brothers is alive. He indicated that the status of his neg hx is unknown.      family history includes Cancer in his sister and sister. SOCIAL HISTORY      reports that he quit smoking about 13 years ago. His smoking use included Cigarettes. He smoked 0.50 packs per day. He has never used smokeless tobacco. He reports that he drinks alcohol. He reports that he does not use drugs. Diagnostic Results       RADIOLOGY:   Non-plain film images such as CT, Ultrasound and MRI are read by the radiologist. [facility] radiographic images are visualized and the radiologist interpretations are reviewed as follows:     XR CHEST STANDARD (2 VW) (Final result)   Result time 17 06:16:15   Final result by Sri Escalona MD (17 06:16:15)                Impression:    1. Stable chest.  No acute cardiopulmonary abnormality. 2. Hyperinflation compatible with COPD. Narrative:    EXAMINATION:  TWO VIEWS OF THE CHEST    2017 6:00 am    COMPARISON:  10/22/2017    HISTORY:  ORDERING SYSTEM PROVIDED HISTORY: Chest pain  TECHNOLOGIST PROVIDED HISTORY:  Reason for exam:->Chest pain  Ordering Physician Provided Reason for Exam: Chest pain and emiesis since  0900 of 17. Hx of cardiac surgery and coronary bypass graft. Acuity: Acute  Type of Exam: Initial  Relevant Medical/Surgical History: Hx of cardiac surgery and coronary bypass  graft. FINDINGS:  Heart size and mediastinal contours are stable.  Thoracic aortic  atherosclerotic disease.  Status post median sternotomy.  Thoracic aorta is  mild tortuous.  Lungs are clear.  No pleural effusion or pneumothorax.  The  lungs are hyperinflated and the diaphragm is flattened.                  EKG:      Repeat:  Sinus 91 with NSST change and occasional PVCs and sinus arrhythmia.   Axis 35, , , QT 354.    LABS:   Results for orders placed or performed during the hospital encounter of 12/27/17   CBC Auto Differential   Result Value Ref Range    WBC 14.2 (H) 3.5 - 11.0 k/uL    RBC 5.17 4.5 - 5.9 m/uL    Hemoglobin 15.8 13.5 - 17.5 g/dL    Hematocrit 47.4 41 - 53 %    MCV 91.8 80 - 100 fL    MCH 30.5 26 - 34 pg    MCHC 33.2 31 - 37 g/dL    RDW 14.9 (H) 11.0 - 14.5 %    Platelets 461 804 - 451 k/uL    MPV 8.9 6.0 - 12.0 fL    Differential Type NOT REPORTED     Immature Granulocytes NOT REPORTED 0 %    Absolute Immature Granulocyte NOT REPORTED 0.00 - 0.30 k/uL    WBC Morphology NOT REPORTED     RBC Morphology NOT REPORTED     Platelet Estimate NOT REPORTED     Seg Neutrophils 83 (H) 44 - 74 %    Lymphocytes 7 (L) 15 - 43 %    Monocytes 9 6 - 14 %    Eosinophils % 0 (L) 1 - 8 %    Basophils 1 0 - 2 %    Segs Absolute 11.80 (H) 1.8 - 7.7 k/uL    Absolute Lymph # 0.90 (L) 1.0 - 4.8 k/uL    Absolute Mono # 1.30 (H) 0.1 - 1.2 k/uL    Absolute Eos # 0.00 0.0 - 0.4 k/uL    Basophils # 0.10 0.0 - 0.2 k/uL   Basic Metabolic Panel   Result Value Ref Range    Glucose 146 (H) 70 - 99 mg/dL    BUN 17 8 - 23 mg/dL    CREATININE 1.10 0.70 - 1.20 mg/dL    Bun/Cre Ratio 15 9 - 20    Calcium 9.6 8.6 - 10.4 mg/dL    Sodium 136 135 - 144 mmol/L    Potassium 4.1 3.7 - 5.3 mmol/L    Chloride 95 (L) 98 - 107 mmol/L    CO2 28 20 - 31 mmol/L    Anion Gap 13 9 - 17 mmol/L    GFR Non-African American >60 >60 mL/min    GFR African American >60 >60 mL/min    GFR Comment          GFR Staging NOT REPORTED    Troponin   Result Value Ref Range    Troponin T <0.03 <0.03 ng/mL    Troponin Interp         Myoglobin   Result Value Ref Range    Myoglobin 98 (H) 28 - 72 ng/mL   Brain Natriuretic Peptide   Result Value Ref Range    Pro-BNP 1,057 (H) <300 pg/mL    BNP Interpretation         Troponin   Result Value Ref Range    Troponin T <0.03 <0.03 ng/mL    Troponin Interp         EKG 12 Lead   Result Value Ref Range    Ventricular Rate 101 BPM Atrial Rate 101 BPM    P-R Interval 218 ms    QRS Duration 94 ms    Q-T Interval 332 ms    QTc Calculation (Bazett) 430 ms    P Axis 84 degrees    R Axis 22 degrees    T Axis 84 degrees   EKG 12 Lead   Result Value Ref Range    Ventricular Rate 91 BPM    Atrial Rate 91 BPM    P-R Interval 230 ms    QRS Duration 102 ms    Q-T Interval 354 ms    QTc Calculation (Bazett) 435 ms    P Axis 87 degrees    R Axis 35 degrees    T Axis 103 degrees       RECENT VITALS:  BP: (!) 208/88, Temp: 99 °F (37.2 °C), Pulse: 92, Resp: 27     ED Course     The patient was given the following medications:  Orders Placed This Encounter   Medications    aspirin chewable tablet 324 mg    ondansetron (ZOFRAN) injection 4 mg    ondansetron (ZOFRAN) injection 4 mg    promethazine (PHENERGAN) injection 25 mg    metoprolol (LOPRESSOR) injection 5 mg    promethazine (PHENERGAN) injection 12.5 mg    morphine (PF) injection 4 mg         Medical Decision Making      The patient presents with vomiting and chest pain. This is similar to when he expressed when he is having A. fib with RVR a few weeks ago. Dr. Tyler Johnson has arranged for the patient to be transferred to [facility].  She spoke with Dr. Tyree Cheek who would like the hospitalist service to admit. I've spoken with Dr. Carmina Ruano who accepts the patient. The patient is transferred in stable condition. [facility] with Dr. Ava Bardales who accepts pt to cardiac step-down. Disposition     FINAL IMPRESSION      1. Chest pain, unspecified type    2. Atrial fibrillation, unspecified type [facility])          DISPOSITION/PLAN   DISPOSITION Decision To Transfer 12/27/2017 06:55:03 AM      CONDITION ON DISPOSITION:     stable    PATIENT REFERRED TO:  No follow-up provider specified.     DISCHARGE MEDICATIONS:  New Prescriptions    No medications on file             (Please note that portions of this note were completed with a voice recognition program.  Efforts were made to edit the dictations

## 2017-12-27 NOTE — ED PROVIDER NOTES
Negative for behavioral problems and confusion. PAST MEDICAL HISTORY    has a past medical history of Aneurysm of infrarenal abdominal aorta ([facility] [address] Atrial fibrillation ([facility] [address] BPH (benign prostatic hyperplasia); CAD (coronary artery disease); Cerebrovascular disease; DVT (deep venous thrombosis) ([facility] [address] Elevated PSA; Hyperlipidemia; Hyperopia with astigmatism and presbyopia; and Hypertension. SURGICAL HISTORY      has a past surgical history that includes back surgery; knee surgery (Left); Coronary artery bypass graft (14); and Cardiac surgery (14). CURRENT MEDICATIONS       Discharge Medication List as of 2017  3:59 PM      CONTINUE these medications which have NOT CHANGED    Details   bethanechol (URECHOLINE) 25 MG tablet Take 1 tablet by mouth every 6 hours, Disp-120 tablet, R-3Normal      apixaban (ELIQUIS) 2.5 MG TABS tablet Take 1 tablet by mouth 2 times daily, Disp-180 tablet, R-3Normal      amLODIPine (NORVASC) 5 MG tablet Take 1 tablet by mouth daily, Disp-90 tablet, R-3Normal      metoprolol succinate (TOPROL XL) 25 MG extended release tablet Take 0.5 tablets by mouth nightly, Disp-45 tablet, R-3Normal      furosemide (LASIX) 40 MG tablet TAKE 1 TABLET DAILY, Disp-90 tablet, R-1Normal      lisinopril (PRINIVIL;ZESTRIL) 20 MG tablet Take 1 tablet by mouth daily MUST HAVE APPOINTMENT WITH DR. Denia Bonilla FOR ANY ADDITIONAL REFILLS!!, Disp-90 tablet, R-1Normal      tamsulosin (FLOMAX) 0.4 MG capsule TAKE 1 CAPSULE DAILY, Disp-90 capsule, R-1Normal      simvastatin (ZOCOR) 20 MG tablet TAKE 1 TABLET NIGHTLY, Disp-90 tablet, R-2Normal      metoprolol tartrate (LOPRESSOR) 25 MG tablet Take 0.5 tablets by mouth nightly, Disp-45 tablet, R-3Normal      aspirin 81 MG tablet Take 81 mg by mouth daily. ALLERGIES     has No Known Allergies. FAMILY HISTORY     indicated that his mother is . He indicated that his father is .  He indicated that two of his four sisters are . He indicated that only one of his two brothers is alive. He indicated that the status of his neg hx is unknown.      family history includes Cancer in his sister and sister. SOCIAL HISTORY      reports that he quit smoking about 13 years ago. His smoking use included Cigarettes. He smoked 0.50 packs per day. He has never used smokeless tobacco. He reports that he drinks alcohol. He reports that he does not use drugs. PHYSICAL EXAM    (up to 7 for level 4, 8 or more for level 5)   INITIAL VITALS:  height is 5' 10\" (1.778 m) and weight is 99.8 kg (220 lb). His tympanic temperature is 99 °F (37.2 °C). His blood pressure is 208/88 (abnormal) and his pulse is 92. His respiration is 27 and oxygen saturation is 91%. Physical Exam   Constitutional: He is oriented to person, place, and time. He appears well-developed and well-nourished. No distress. HENT:   Head: Normocephalic and atraumatic. Right Ear: External ear normal.   Left Ear: External ear normal.   Nose: Nose normal.   Mouth/Throat: Oropharynx is clear and moist.   Eyes: Conjunctivae and EOM are normal. Pupils are equal, round, and reactive to light. Right eye exhibits no discharge. Left eye exhibits no discharge. Neck: Normal range of motion. No JVD present. Cardiovascular: Normal heart sounds. An irregularly irregular rhythm present. Tachycardia present. No murmur heard. Pulmonary/Chest: Effort normal and breath sounds normal. No respiratory distress. He exhibits no tenderness. Abdominal: Soft. Bowel sounds are normal. He exhibits no distension and no mass. There is no tenderness. There is no rebound and no guarding. Musculoskeletal: Normal range of motion. He exhibits no edema or tenderness. Lymphadenopathy:     He has no cervical adenopathy. Neurological: He is alert and oriented to person, place, and time. Skin: Skin is warm and dry. No rash noted. He is not diaphoretic.    Psychiatric: He has a normal mood and affect. His behavior is normal.       DIFFERENTIAL DIAGNOSIS/ MDM:     I did discuss with patient and spouse at bedside relate to go ahead and get a workup here including labs, chest x-ray and EKG. He is comfortable with this plan. We will also give him something for nausea and try to get his aspirin in. DIAGNOSTIC RESULTS     EKG: All EKG's are interpreted by the Emergency Department Physician who either signs or Co-signs this chart in the absence of a cardiologist.    EKG Interpretation    Interpreted by me    Rhythm: atrial fibrillation - rapid  Rate: tachycardia  Axis: normal  Ectopy: none  Conduction: irregularly irregular, first-degree block  ST Segments: nonspecific changes  T Waves: no acute change  Q Waves: nonspecific    Clinical Impression: atrial fibrillation with rapid ventricular rate      RADIOLOGY:   Non-plain film images such as CT, Ultrasound and MRI are read by the radiologist. Plain radiographic images are visualized and the radiologist interpretations are reviewed as follows:     Interpretation per the Radiologist below, if available at the time of this note:    Xr Chest Standard (2 Vw)    Result Date: 12/27/2017  EXAMINATION: TWO VIEWS OF THE CHEST 12/27/2017 6:00 am COMPARISON: 10/22/2017 HISTORY: ORDERING SYSTEM PROVIDED HISTORY: Chest pain TECHNOLOGIST PROVIDED HISTORY: Reason for exam:->Chest pain Ordering Physician Provided Reason for Exam: Chest pain and emiesis since 0900 of 12/26/17. Hx of cardiac surgery and coronary bypass graft. Acuity: Acute Type of Exam: Initial Relevant Medical/Surgical History: Hx of cardiac surgery and coronary bypass graft. FINDINGS: Heart size and mediastinal contours are stable. Thoracic aortic atherosclerotic disease. Status post median sternotomy. Thoracic aorta is mild tortuous. Lungs are clear. No pleural effusion or pneumothorax. The lungs are hyperinflated and the diaphragm is flattened.      1. Stable chest.  No acute cardiopulmonary abnormality. 2. Hyperinflation compatible with COPD. Us Gallbladder Ruq    Result Date: 12/28/2017  EXAMINATION: RIGHT UPPER QUADRANT ULTRASOUND 12/28/2017 8:00 am COMPARISON: CT abdomen and pelvis October 27, 2014 HISTORY: ORDERING SYSTEM PROVIDED HISTORY: ABDOMINAL PAIN FINDINGS: LIVER:  The visualized portions of the liver demonstrates normal echogenicity without evidence of intrahepatic biliary ductal dilatation. Liver cysts are present. BILIARY SYSTEM:  There is a large gallstone this is lodged in the gallbladder neck/ fundus. Diffuse gallbladder wall thickening and trace pericholecystic fluid present. Echogenic foci arising from the gallbladder wall with comet tail artifact most consistent with cholesterolosis/adenomyomatosis. The sonographer reports positive Zapien's sign upon transducer pressure over the gallbladder. The common bile duct is markedly dilated measuring up to 15 mm. No significant intrahepatic biliary ductal dilatation. However, the pancreatic duct is mildly dilated measuring up to 4 mm. RIGHT KIDNEY: Right lower pole renal cyst noted. OTHER: No evidence of right upper quadrant ascites. Findings consistent with acute cholecystitis in the appropriate clinical setting. Dilated common bile and pancreatic ducts. Further evaluation with year CP/MRCP could be performed as clinically indicated. Xr Chest Portable    Result Date: 12/27/2017  EXAMINATION: SINGLE VIEW OF THE CHEST 12/27/2017 8:06 pm COMPARISON: December 27, 2017 HISTORY: ORDERING SYSTEM PROVIDED HISTORY: chf TECHNOLOGIST PROVIDED HISTORY: Reason for exam:->chf FINDINGS: Cardiomegaly noted there has been median sternotomy. Aortic arch is mildly ectatic and partially calcified. Lung fields are well aerated. There is no evidence of infiltrates signs of congestion. No sizable pleural effusions identified. No acute cardiopulmonary disease.      LABS:  Results for orders placed or performed during the hospital encounter of 12/27/17   CBC Auto Differential   Result Value Ref Range    WBC 14.2 (H) 3.5 - 11.0 k/uL    RBC 5.17 4.5 - 5.9 m/uL    Hemoglobin 15.8 13.5 - 17.5 g/dL    Hematocrit 47.4 41 - 53 %    MCV 91.8 80 - 100 fL    MCH 30.5 26 - 34 pg    MCHC 33.2 31 - 37 g/dL    RDW 14.9 (H) 11.0 - 14.5 %    Platelets 752 892 - 083 k/uL    MPV 8.9 6.0 - 12.0 fL    Differential Type NOT REPORTED     Immature Granulocytes NOT REPORTED 0 %    Absolute Immature Granulocyte NOT REPORTED 0.00 - 0.30 k/uL    WBC Morphology NOT REPORTED     RBC Morphology NOT REPORTED     Platelet Estimate NOT REPORTED     Seg Neutrophils 83 (H) 44 - 74 %    Lymphocytes 7 (L) 15 - 43 %    Monocytes 9 6 - 14 %    Eosinophils % 0 (L) 1 - 8 %    Basophils 1 0 - 2 %    Segs Absolute 11.80 (H) 1.8 - 7.7 k/uL    Absolute Lymph # 0.90 (L) 1.0 - 4.8 k/uL    Absolute Mono # 1.30 (H) 0.1 - 1.2 k/uL    Absolute Eos # 0.00 0.0 - 0.4 k/uL    Basophils # 0.10 0.0 - 0.2 k/uL   Basic Metabolic Panel   Result Value Ref Range    Glucose 146 (H) 70 - 99 mg/dL    BUN 17 8 - 23 mg/dL    CREATININE 1.10 0.70 - 1.20 mg/dL    Bun/Cre Ratio 15 9 - 20    Calcium 9.6 8.6 - 10.4 mg/dL    Sodium 136 135 - 144 mmol/L    Potassium 4.1 3.7 - 5.3 mmol/L    Chloride 95 (L) 98 - 107 mmol/L    CO2 28 20 - 31 mmol/L    Anion Gap 13 9 - 17 mmol/L    GFR Non-African American >60 >60 mL/min    GFR African American >60 >60 mL/min    GFR Comment          GFR Staging NOT REPORTED    Troponin   Result Value Ref Range    Troponin T <0.03 <0.03 ng/mL    Troponin Interp         Myoglobin   Result Value Ref Range    Myoglobin 98 (H) 28 - 72 ng/mL   Brain Natriuretic Peptide   Result Value Ref Range    Pro-BNP 1,057 (H) <300 pg/mL    BNP Interpretation         Troponin   Result Value Ref Range    Troponin T <0.03 <0.03 ng/mL    Troponin Interp         EKG 12 Lead   Result Value Ref Range    Ventricular Rate 101 BPM    Atrial Rate 101 BPM    P-R Interval 218 ms    QRS Duration 94 ms    Q-T Interval 332 ms QTc Calculation (Bazett) 430 ms    P Axis 84 degrees    R Axis 22 degrees    T Axis 84 degrees   EKG 12 Lead   Result Value Ref Range    Ventricular Rate 91 BPM    Atrial Rate 91 BPM    P-R Interval 230 ms    QRS Duration 102 ms    Q-T Interval 354 ms    QTc Calculation (Bazett) 435 ms    P Axis 87 degrees    R Axis 35 degrees    T Axis 103 degrees     Labs reviewed. EMERGENCY DEPARTMENT COURSE:   Vitals:    Vitals:    12/27/17 0748 12/27/17 0848 12/27/17 1127 12/27/17 1156   BP: (!) 191/97 (!) 208/116 (!) 188/81 (!) 208/88   Pulse: 77 89 95 92   Resp: 23 26 25 27   Temp:       TempSrc:       SpO2: 95%  92% 91%   Weight:       Height:         -------------------------  BP: (!) 208/88, Temp: 99 °F (37.2 °C), Pulse: 92, Resp: 27      RE-EVALUATION:  Not much change even with medications here. [place] admit him. He has been to [facility] previously. CONSULTS:  I did speak with cardiology who does not feel that this is heart related because of the nausea/vomiting and recent neg cath and would like the patient admitted to the hospitalist.    PROCEDURES:  None    FINAL IMPRESSION      1. Chest pain, unspecified type    2. Atrial fibrillation, unspecified type [facility])          DISPOSITION/PLAN   DISPOSITION  Transfer to [address] [place] [address]:   stable    PATIENT REFERRED TO:  No follow-up provider specified. DISCHARGE MEDICATIONS:  Discharge Medication List as of 12/27/2017  3:59 PM          (Please note that portions of this note were completed with a voice recognition program.  Efforts were made to edit the dictations but occasionally words are mis-transcribed.)    Díaz MD, F.A.C.E.P.   Attending Emergency Medicine Physician        Yaima Cerna MD  12/29/17 [id number]

## 2017-12-27 NOTE — H&P
TABS tablet Take 1 tablet by mouth 2 times daily 11/6/17   Dafne Rojo MD   amLODIPine [facility]) 5 MG tablet Take 1 tablet by mouth daily 11/6/17   Dafne Rojo MD   metoprolol succinate (TOPROL XL) 25 MG extended release tablet Take 0.5 tablets by mouth nightly 11/6/17   Dafne Rojo MD   furosemide (LASIX) 40 MG tablet TAKE 1 TABLET DAILY 11/3/17   Simona Paintign NP   lisinopril (PRINIVIL;ZESTRIL) 20 MG tablet Take 1 tablet by mouth daily MUST HAVE APPOINTMENT WITH DR. Simone Libman FOR ANY ADDITIONAL REFILLS!! 11/3/17   Simona Pianting NP   tamsulosin (FLOMAX) 0.4 MG capsule TAKE 1 CAPSULE DAILY 11/3/17   Simona Painting NP   simvastatin (ZOCOR) 20 MG tablet TAKE 1 TABLET NIGHTLY 8/28/17   Catrina Buerger, MD   metoprolol tartrate (LOPRESSOR) 25 MG tablet Take 0.5 tablets by mouth nightly 5/4/17   Verena Chen MD   amiodarone (CORDARONE) 200 MG tablet Take 1 tablet by mouth daily. 1/7/15 5/4/16  Jose G Lopez MD   aspirin 81 MG tablet Take 81 mg by mouth daily. Historical Provider, MD        Allergies:     Review of patient's allergies indicates no known allergies. Social History:     Tobacco:    reports that he quit smoking about 13 years ago. His smoking use included Cigarettes. He smoked 0.50 packs per day. He has never used smokeless tobacco.  Alcohol:      reports that he drinks alcohol.-Not daily  Drug Use:  reports that he does not use drugs. Family History:     Family History   Problem Relation Age of Onset    Cancer Sister      ?  Cancer Sister      lung     Glaucoma Neg Hx        Review of Systems:     Positive and Negative as described in HPI. CONSTITUTIONAL:  negative for fevers, chills, sweats, fatigue, weight loss  HEENT:  negative for vision, hearing changes, runny nose, throat pain  RESPIRATORY:  negative for shortness of breath, cough, congestion, wheezing.   CARDIOVASCULAR:  + for chest pain,No sweating  GASTROINTESTINAL:  + for gross lesions, rashes, bruising or bleeding on exposed skin area  Extremities:  peripheral pulses palpable, no pedal edema or calf pain with palpation  Psych: normal affect    Investigations:      Laboratory Testing:  Recent Results (from the past 24 hour(s))   EKG 12 Lead    Collection Time: 12/27/17  5:17 AM   Result Value Ref Range    Ventricular Rate 101 BPM    Atrial Rate 101 BPM    P-R Interval 218 ms    QRS Duration 94 ms    Q-T Interval 332 ms    QTc Calculation (Bazett) 430 ms    P Axis 84 degrees    R Axis 22 degrees    T Axis 84 degrees   CBC Auto Differential    Collection Time: 12/27/17  5:49 AM   Result Value Ref Range    WBC 14.2 (H) 3.5 - 11.0 k/uL    RBC 5.17 4.5 - 5.9 m/uL    Hemoglobin 15.8 13.5 - 17.5 g/dL    Hematocrit 47.4 41 - 53 %    MCV 91.8 80 - 100 fL    MCH 30.5 26 - 34 pg    MCHC 33.2 31 - 37 g/dL    RDW 14.9 (H) 11.0 - 14.5 %    Platelets 015 084 - 500 k/uL    MPV 8.9 6.0 - 12.0 fL    Differential Type NOT REPORTED     Immature Granulocytes NOT REPORTED 0 %    Absolute Immature Granulocyte NOT REPORTED 0.00 - 0.30 k/uL    WBC Morphology NOT REPORTED     RBC Morphology NOT REPORTED     Platelet Estimate NOT REPORTED     Seg Neutrophils 83 (H) 44 - 74 %    Lymphocytes 7 (L) 15 - 43 %    Monocytes 9 6 - 14 %    Eosinophils % 0 (L) 1 - 8 %    Basophils 1 0 - 2 %    Segs Absolute 11.80 (H) 1.8 - 7.7 k/uL    Absolute Lymph # 0.90 (L) 1.0 - 4.8 k/uL    Absolute Mono # 1.30 (H) 0.1 - 1.2 k/uL    Absolute Eos # 0.00 0.0 - 0.4 k/uL    Basophils # 0.10 0.0 - 0.2 k/uL   Basic Metabolic Panel    Collection Time: 12/27/17  5:49 AM   Result Value Ref Range    Glucose 146 (H) 70 - 99 mg/dL    BUN 17 8 - 23 mg/dL    CREATININE 1.10 0.70 - 1.20 mg/dL    Bun/Cre Ratio 15 9 - 20    Calcium 9.6 8.6 - 10.4 mg/dL    Sodium 136 135 - 144 mmol/L    Potassium 4.1 3.7 - 5.3 mmol/L    Chloride 95 (L) 98 - 107 mmol/L    CO2 28 20 - 31 mmol/L    Anion Gap 13 9 - 17 mmol/L    GFR Non-African American >60 >60 mL/min GFR African American >60 >60 mL/min    GFR Comment          GFR Staging NOT REPORTED    Troponin    Collection Time: 12/27/17  5:49 AM   Result Value Ref Range    Troponin T <0.03 <0.03 ng/mL    Troponin Interp         Myoglobin    Collection Time: 12/27/17  5:49 AM   Result Value Ref Range    Myoglobin 98 (H) 28 - 72 ng/mL   Brain Natriuretic Peptide    Collection Time: 12/27/17  5:53 AM   Result Value Ref Range    Pro-BNP 1,057 (H) <300 pg/mL    BNP Interpretation         Troponin    Collection Time: 12/27/17  9:29 AM   Result Value Ref Range    Troponin T <0.03 <0.03 ng/mL    Troponin Interp         EKG 12 Lead    Collection Time: 12/27/17  9:38 AM   Result Value Ref Range    Ventricular Rate 91 BPM    Atrial Rate 91 BPM    P-R Interval 230 ms    QRS Duration 102 ms    Q-T Interval 354 ms    QTc Calculation (Bazett) 435 ms    P Axis 87 degrees    R Axis 35 degrees    T Axis 103 degrees       Imaging/Diagnostics:  CXR  1. Stable chest.  No acute cardiopulmonary abnormality. 2. Hyperinflation compatible with COPD.            Assessment :      Primary Problem  Atypical chest pain    Active Hospital Problems    Diagnosis Date Noted    Non-intractable vomiting with nausea [R11.2] 12/27/2017     Priority: High    Chronic diastolic CHF (congestive heart failure) (HCC) [I50.32] 12/27/2017     Priority: High    Atypical chest pain [R07.89] 10/22/2017     Priority: High    Atrial fibrillation ([facility] [address] [I48.91] 11/25/2014     Priority: Medium    S/P CABG (coronary artery bypass graft) [Z95.1] 11/21/2014     Priority: Medium    CAD (coronary artery disease), native coronary artery [I25.10] 11/14/2014     Priority: Medium    Hypertension [I10]      Priority: Medium    AAA (abdominal aortic aneurysm) without rupture ([facility] [address] [I71.4] 10/30/2014     Priority: Low    BPH (benign prostatic hyperplasia) [N40.0]      Priority: Low       Plan:     Patient status Admit as inpatient in the  Progressive Unit/Step

## 2017-12-28 ENCOUNTER — APPOINTMENT (OUTPATIENT)
Dept: ULTRASOUND IMAGING | Age: 82
DRG: 418 | End: 2017-12-28
Attending: INTERNAL MEDICINE
Payer: MEDICARE

## 2017-12-28 PROBLEM — K80.00 CALCULUS OF GALLBLADDER WITH ACUTE CHOLECYSTITIS: Status: ACTIVE | Noted: 2017-12-28

## 2017-12-28 LAB
ALBUMIN SERPL-MCNC: 3.5 G/DL (ref 3.5–5.2)
ALBUMIN/GLOBULIN RATIO: 1.1 (ref 1–2.5)
ALP BLD-CCNC: 75 U/L (ref 40–129)
ALT SERPL-CCNC: 11 U/L (ref 5–41)
ANION GAP SERPL CALCULATED.3IONS-SCNC: 15 MMOL/L (ref 9–17)
AST SERPL-CCNC: 17 U/L
BILIRUB SERPL-MCNC: 1.27 MG/DL (ref 0.3–1.2)
BUN BLDV-MCNC: 27 MG/DL (ref 8–23)
BUN BLDV-MCNC: 33 MG/DL (ref 8–23)
BUN/CREAT BLD: ABNORMAL (ref 9–20)
CALCIUM SERPL-MCNC: 8.6 MG/DL (ref 8.6–10.4)
CHLORIDE BLD-SCNC: 98 MMOL/L (ref 98–107)
CHOLESTEROL/HDL RATIO: 2.1
CHOLESTEROL: 119 MG/DL
CO2: 21 MMOL/L (ref 20–31)
CREAT SERPL-MCNC: 1.49 MG/DL (ref 0.7–1.2)
CREAT SERPL-MCNC: 1.7 MG/DL (ref 0.7–1.2)
EKG ATRIAL RATE: 101 BPM
EKG ATRIAL RATE: 91 BPM
EKG P AXIS: 84 DEGREES
EKG P AXIS: 87 DEGREES
EKG P-R INTERVAL: 218 MS
EKG P-R INTERVAL: 230 MS
EKG Q-T INTERVAL: 332 MS
EKG Q-T INTERVAL: 354 MS
EKG QRS DURATION: 102 MS
EKG QRS DURATION: 94 MS
EKG QTC CALCULATION (BAZETT): 430 MS
EKG QTC CALCULATION (BAZETT): 435 MS
EKG R AXIS: 22 DEGREES
EKG R AXIS: 35 DEGREES
EKG T AXIS: 103 DEGREES
EKG T AXIS: 84 DEGREES
EKG VENTRICULAR RATE: 101 BPM
EKG VENTRICULAR RATE: 91 BPM
GFR AFRICAN AMERICAN: 47 ML/MIN
GFR AFRICAN AMERICAN: 54 ML/MIN
GFR NON-AFRICAN AMERICAN: 39 ML/MIN
GFR NON-AFRICAN AMERICAN: 45 ML/MIN
GFR SERPL CREATININE-BSD FRML MDRD: ABNORMAL ML/MIN/{1.73_M2}
GLUCOSE BLD-MCNC: 109 MG/DL (ref 70–99)
HCT VFR BLD CALC: 46 % (ref 40.7–50.3)
HDLC SERPL-MCNC: 58 MG/DL
HEMOGLOBIN: 14.8 G/DL (ref 13–17)
LDL CHOLESTEROL: 48 MG/DL (ref 0–130)
MCH RBC QN AUTO: 29.8 PG (ref 25.2–33.5)
MCHC RBC AUTO-ENTMCNC: 32.2 G/DL (ref 28.4–34.8)
MCV RBC AUTO: 92.7 FL (ref 82.6–102.9)
PARTIAL THROMBOPLASTIN TIME: 25.6 SEC (ref 21.3–31.3)
PDW BLD-RTO: 14.4 % (ref 11.8–14.4)
PLATELET # BLD: ABNORMAL K/UL (ref 138–453)
PLATELET, FLUORESCENCE: 123 K/UL (ref 138–453)
PLATELET, IMMATURE FRACTION: 4 % (ref 1.1–10.3)
PMV BLD AUTO: ABNORMAL FL (ref 8.1–13.5)
POTASSIUM SERPL-SCNC: 4.1 MMOL/L (ref 3.7–5.3)
RBC # BLD: 4.96 M/UL (ref 4.21–5.77)
SODIUM BLD-SCNC: 134 MMOL/L (ref 135–144)
TOTAL PROTEIN: 6.8 G/DL (ref 6.4–8.3)
TRIGL SERPL-MCNC: 63 MG/DL
VLDLC SERPL CALC-MCNC: NORMAL MG/DL (ref 1–30)
WBC # BLD: 16.5 K/UL (ref 3.5–11.3)

## 2017-12-28 PROCEDURE — 36415 COLL VENOUS BLD VENIPUNCTURE: CPT

## 2017-12-28 PROCEDURE — 2500000003 HC RX 250 WO HCPCS: Performed by: INTERNAL MEDICINE

## 2017-12-28 PROCEDURE — 2580000003 HC RX 258: Performed by: INTERNAL MEDICINE

## 2017-12-28 PROCEDURE — S0028 INJECTION, FAMOTIDINE, 20 MG: HCPCS | Performed by: INTERNAL MEDICINE

## 2017-12-28 PROCEDURE — 85027 COMPLETE CBC AUTOMATED: CPT

## 2017-12-28 PROCEDURE — 76705 ECHO EXAM OF ABDOMEN: CPT

## 2017-12-28 PROCEDURE — 85730 THROMBOPLASTIN TIME PARTIAL: CPT

## 2017-12-28 PROCEDURE — 6370000000 HC RX 637 (ALT 250 FOR IP): Performed by: INTERNAL MEDICINE

## 2017-12-28 PROCEDURE — 6360000002 HC RX W HCPCS: Performed by: INTERNAL MEDICINE

## 2017-12-28 PROCEDURE — 82565 ASSAY OF CREATININE: CPT

## 2017-12-28 PROCEDURE — 84520 ASSAY OF UREA NITROGEN: CPT

## 2017-12-28 PROCEDURE — 99232 SBSQ HOSP IP/OBS MODERATE 35: CPT | Performed by: INTERNAL MEDICINE

## 2017-12-28 PROCEDURE — 94762 N-INVAS EAR/PLS OXIMTRY CONT: CPT

## 2017-12-28 PROCEDURE — 80061 LIPID PANEL: CPT

## 2017-12-28 PROCEDURE — 2060000000 HC ICU INTERMEDIATE R&B

## 2017-12-28 PROCEDURE — 80053 COMPREHEN METABOLIC PANEL: CPT

## 2017-12-28 RX ORDER — SODIUM CHLORIDE 9 MG/ML
INJECTION, SOLUTION INTRAVENOUS CONTINUOUS
Status: DISCONTINUED | OUTPATIENT
Start: 2017-12-28 | End: 2018-01-01 | Stop reason: HOSPADM

## 2017-12-28 RX ORDER — HEPARIN SODIUM 1000 [USP'U]/ML
2000 INJECTION, SOLUTION INTRAVENOUS; SUBCUTANEOUS PRN
Status: DISCONTINUED | OUTPATIENT
Start: 2017-12-28 | End: 2017-12-31

## 2017-12-28 RX ORDER — HEPARIN SODIUM 1000 [USP'U]/ML
4000 INJECTION, SOLUTION INTRAVENOUS; SUBCUTANEOUS ONCE
Status: COMPLETED | OUTPATIENT
Start: 2017-12-28 | End: 2017-12-28

## 2017-12-28 RX ORDER — HEPARIN SODIUM 1000 [USP'U]/ML
4000 INJECTION, SOLUTION INTRAVENOUS; SUBCUTANEOUS PRN
Status: DISCONTINUED | OUTPATIENT
Start: 2017-12-28 | End: 2017-12-31

## 2017-12-28 RX ORDER — HEPARIN SODIUM 10000 [USP'U]/100ML
12 INJECTION, SOLUTION INTRAVENOUS CONTINUOUS
Status: DISCONTINUED | OUTPATIENT
Start: 2017-12-28 | End: 2017-12-31

## 2017-12-28 RX ORDER — LEVOFLOXACIN 5 MG/ML
500 INJECTION, SOLUTION INTRAVENOUS EVERY 24 HOURS
Status: DISCONTINUED | OUTPATIENT
Start: 2017-12-28 | End: 2017-12-31

## 2017-12-28 RX ADMIN — AMLODIPINE BESYLATE 5 MG: 5 TABLET ORAL at 08:40

## 2017-12-28 RX ADMIN — SIMVASTATIN 20 MG: 20 TABLET, FILM COATED ORAL at 21:27

## 2017-12-28 RX ADMIN — METOPROLOL TARTRATE 12.5 MG: 25 TABLET ORAL at 21:27

## 2017-12-28 RX ADMIN — FAMOTIDINE 20 MG: 10 INJECTION, SOLUTION INTRAVENOUS at 21:28

## 2017-12-28 RX ADMIN — FAMOTIDINE 20 MG: 10 INJECTION, SOLUTION INTRAVENOUS at 08:41

## 2017-12-28 RX ADMIN — TAMSULOSIN HYDROCHLORIDE 0.4 MG: 0.4 CAPSULE ORAL at 08:42

## 2017-12-28 RX ADMIN — DOCUSATE SODIUM 100 MG: 100 CAPSULE, LIQUID FILLED ORAL at 21:28

## 2017-12-28 RX ADMIN — DOCUSATE SODIUM 100 MG: 100 CAPSULE, LIQUID FILLED ORAL at 08:40

## 2017-12-28 RX ADMIN — ONDANSETRON 4 MG: 2 INJECTION, SOLUTION INTRAMUSCULAR; INTRAVENOUS at 01:00

## 2017-12-28 RX ADMIN — LEVOFLOXACIN 500 MG: 5 INJECTION, SOLUTION INTRAVENOUS at 13:44

## 2017-12-28 RX ADMIN — Medication 10 ML: at 08:41

## 2017-12-28 RX ADMIN — HEPARIN SODIUM AND DEXTROSE 9.82 UNITS/KG/HR: 10000; 5 INJECTION INTRAVENOUS at 18:26

## 2017-12-28 RX ADMIN — BETHANECHOL CHLORIDE 25 MG: 25 TABLET ORAL at 08:41

## 2017-12-28 RX ADMIN — BETHANECHOL CHLORIDE 25 MG: 25 TABLET ORAL at 21:28

## 2017-12-28 RX ADMIN — Medication 81 MG: at 08:40

## 2017-12-28 RX ADMIN — SODIUM CHLORIDE: 9 INJECTION, SOLUTION INTRAVENOUS at 11:50

## 2017-12-28 RX ADMIN — HEPARIN SODIUM 4000 UNITS: 1000 INJECTION, SOLUTION INTRAVENOUS; SUBCUTANEOUS at 18:27

## 2017-12-28 RX ADMIN — BETHANECHOL CHLORIDE 25 MG: 25 TABLET ORAL at 13:48

## 2017-12-28 RX ADMIN — APIXABAN 2.5 MG: 2.5 TABLET, FILM COATED ORAL at 08:40

## 2017-12-28 ASSESSMENT — PAIN SCALES - GENERAL: PAINLEVEL_OUTOF10: 0

## 2017-12-28 NOTE — CONSULTS
Dilated common bile and pancreatic ducts. Further evaluation with year CP/MRCP could be performed as clinically indicated. Xr Chest Portable    Result Date: 12/27/2017  EXAMINATION: SINGLE VIEW OF THE CHEST 12/27/2017 8:06 pm COMPARISON: December 27, 2017 HISTORY: ORDERING SYSTEM PROVIDED HISTORY: chf TECHNOLOGIST PROVIDED HISTORY: Reason for exam:->chf FINDINGS: Cardiomegaly noted there has been median sternotomy. Aortic arch is mildly ectatic and partially calcified. Lung fields are well aerated. There is no evidence of infiltrates signs of congestion. No sizable pleural effusions identified. No acute cardiopulmonary disease. ASSESSMENT:  Active Hospital Problems    Diagnosis Date Noted    Non-intractable vomiting with nausea [R11.2] 12/27/2017    Chronic diastolic CHF (congestive heart failure) ([facility] [address] [I50.32] 12/27/2017    Atypical chest pain [R07.89] 10/22/2017    Atrial fibrillation (HCC) [I48.91] 11/25/2014    S/P CABG (coronary artery bypass graft) [Z95.1] 11/21/2014    CAD (coronary artery disease), native coronary artery [I25.10] 11/14/2014    AAA (abdominal aortic aneurysm) without rupture ([facility] [address] [I71.4] 10/30/2014    BPH (benign prostatic hyperplasia) [N40.0]     Hypertension [I10]        80 y.o. male with acute on chronic cholecystitis, dilated common bile duct and pancreatic duct      Plan:  1. Continue medical mgmt and supportive care per primary  2. Will plan to do laparoscopy cholecystectomy, pt is on Eliquis, recommend stopping Eliquis and starting pt on Heparin drip, will have to hold eliquis for 48 hrs. 3. Recommend MRCP for dilated CBD/pancreatic duct.      Electronically signed by Santos Bernabe,   on 12/28/2017 at 1:07 PM

## 2017-12-28 NOTE — CONSULTS
[facility]  H and P note                  Date:   12/28/2017  Patient name: Catie Parry  Date of admission:  12/27/2017  4:18 PM  MRN:   [id number]  YOB: 1933    Reason for Admission: chest pain and vomiting     CHIEF COMPLAINT:   Chest pain    History Obtained From:  Patient and chart review     HISTORY OF PRESENT ILLNESS:      Patient is a 79 yo male with past medical history of atrial fibrillation on eliquis, BPH, CAD S/P CABG x 5 in 2014  , DVT, HLD, HTN, EX SMOKER presented as a transfer dorm [facility]. He presented there secondary to chest pain. Chest  Pain is substernal, no radiation,  6/10, started on Monday while he was driving car , constant associated with nausea and vomiting. No other associated symptoms. No improvement in chest pain after nitro x 3. Patient was recently admitted in October 2017 for unstable angina and cath showed patent x3 grafts. Currently in atrial fibrillation rhythm with HR 80-90, normotensive. Afebrile. Chest pain 3/10, improved with narco.   Labs reveal leukocytosis, wbc 16. Trop x 2 negative, EKG showed NSR with first degree block. Pro BNP 1057  CXR : No acute abnormality. Past Medical History:   has a past medical history of Aneurysm of infrarenal abdominal aorta ([address] Atrial fibrillation ([address] BPH (benign prostatic hyperplasia); CAD (coronary artery disease); Cerebrovascular disease; DVT (deep venous thrombosis) ([address] Elevated PSA; Hyperlipidemia; Hyperopia with astigmatism and presbyopia; and Hypertension. Past Surgical History:   has a past surgical history that includes back surgery; knee surgery (Left); Coronary artery bypass graft (11/17/14); and Cardiac surgery (11-17-14). Home Medications:    Prior to Admission medications    Medication Sig Start Date End Date Taking?  Authorizing Provider   bethanechol (URECHOLINE) 25 MG tablet Take 1 tablet by mouth every 6 hours 11/28/17   Asa Nolasco MD   apixaban the encounter have been performed by me. I agree with the assessment, plan and orders as documented by the fellow/resident, after I modified exam findings and plan of treatments, and the final version is my approved version of the assessment. Additional Comments:  Non cardiac chest pain- u/s consistent with acute choley  Recent cath and echo as above  - no further cardiac work up  - primary to assess/treat acute choley. Thank you for allowing me to participate in the care of this patient, please do not hesitate to call if you have any questions. [facility], [id number] [facility]  [facility] [employer]  [phone]

## 2017-12-28 NOTE — PROGRESS NOTES
James Soliz 19    Progress Note    12/28/2017    2:06 PM    Name:   Clement Bradley  MRN:     [id number]     [place]:      [de-identified]   Room:   [address] [place] [address] Day:  1  Admit Date:  12/27/2017  4:18 PM    PCP:   Sudha Bradley MD  Code Status:  Full Code    Subjective:     C/C: Chest pain  Nausea vomiting  Interval History Status:  Denies any chest pain today  Denies nausea vomiting  Still has mild pain in the right upper quadrant        Brief History:   Jeancarlos San a 80 y. o. male who presents Complaining of midsternal chest pain that started 24 hours ago.  [facility] has been having nausea and vomiting.  States  About 4 months ago when he had A. fib. And  At that time he had to be admitted, had a cath and was put on Eliquis.  He relates that the cath was okay. Before transfer to this hospital he was given a dose of Phenergan with which he was sleeping he woke up just now and is not vomiting      Review of Systems:     Constitutional:  negative for chills, fevers, sweats  Respiratory:  negative for cough, dyspnea on exertion, hemoptysis, shortness of breath, wheezing  Cardiovascular:  negative for chest pain, chest pressure/discomfort, lower extremity edema, palpitations  Gastrointestinal:  Mild right upper quadrant abdominal pain, no, nausea, vomiting  Neurological:  negative for dizziness, headache    Medications:      Allergies:  No Known Allergies    Current Meds:   Scheduled Meds:    levofloxacin  500 mg Intravenous Q24H    heparin (porcine)  4,000 Units Intravenous Once    amLODIPine  5 mg Oral Daily    aspirin  81 mg Oral Daily    bethanechol  25 mg Oral Q6H    metoprolol tartrate  12.5 mg Oral Nightly    simvastatin  20 mg Oral Nightly    tamsulosin  0.4 mg Oral Daily    sodium chloride flush  10 mL Intravenous 2 times per day    docusate sodium  100 mg Oral BID    famotidine (PEPCID) injection  20 mg Intravenous BID gallbladder with acute cholecystitis [K80.00] 12/28/2017     Priority: High    Non-intractable vomiting with nausea [R11.2] 12/27/2017     Priority: High    Chronic diastolic CHF (congestive heart failure) (HCC) [I50.32] 12/27/2017     Priority: High    Atypical chest pain [R07.89] 10/22/2017     Priority: High    Atrial fibrillation ([facility] [I48.91] 11/25/2014     Priority: Medium    S/P CABG (coronary artery bypass graft) [Z95.1] 11/21/2014     Priority: Medium    CAD (coronary artery disease), native coronary artery [I25.10] 11/14/2014     Priority: Medium    Hypertension [I10]      Priority: Medium    AAA (ab[facility]) [facility] [I71.4] 10/30/2014     Priority: Low    BPH (benign prostatic hyperplasia) [N40.0]      Priority: Low       Plan:        1. No further cardiac workup planned by cardiology  2. Oral anticoagulation has been switched to low-dose heparin drip by surgery for anticipated laparoscopic cholecystectomy after 48 hours.   3. Start Maxim Razo MD  12/28/2017  2:06 PM

## 2017-12-29 ENCOUNTER — APPOINTMENT (OUTPATIENT)
Dept: GENERAL RADIOLOGY | Age: 82
DRG: 418 | End: 2017-12-29
Attending: INTERNAL MEDICINE
Payer: MEDICARE

## 2017-12-29 ENCOUNTER — ANESTHESIA EVENT (OUTPATIENT)
Dept: OPERATING ROOM | Age: 82
DRG: 418 | End: 2017-12-29
Payer: MEDICARE

## 2017-12-29 ENCOUNTER — ANESTHESIA (OUTPATIENT)
Dept: OPERATING ROOM | Age: 82
DRG: 418 | End: 2017-12-29
Payer: MEDICARE

## 2017-12-29 ENCOUNTER — APPOINTMENT (OUTPATIENT)
Dept: MRI IMAGING | Age: 82
DRG: 418 | End: 2017-12-29
Attending: INTERNAL MEDICINE
Payer: MEDICARE

## 2017-12-29 VITALS — SYSTOLIC BLOOD PRESSURE: 154 MMHG | OXYGEN SATURATION: 99 % | DIASTOLIC BLOOD PRESSURE: 97 MMHG

## 2017-12-29 PROBLEM — K80.01 CALCULUS OF GALLBLADDER WITH ACUTE CHOLECYSTITIS AND OBSTRUCTION: Status: ACTIVE | Noted: 2017-12-28

## 2017-12-29 LAB
ANION GAP SERPL CALCULATED.3IONS-SCNC: 13 MMOL/L (ref 9–17)
ANION GAP SERPL CALCULATED.3IONS-SCNC: 15 MMOL/L (ref 9–17)
BUN BLDV-MCNC: 38 MG/DL (ref 8–23)
BUN BLDV-MCNC: 42 MG/DL (ref 8–23)
BUN/CREAT BLD: ABNORMAL (ref 9–20)
BUN/CREAT BLD: ABNORMAL (ref 9–20)
CALCIUM SERPL-MCNC: 8.2 MG/DL (ref 8.6–10.4)
CALCIUM SERPL-MCNC: 8.5 MG/DL (ref 8.6–10.4)
CHLORIDE BLD-SCNC: 100 MMOL/L (ref 98–107)
CHLORIDE BLD-SCNC: 103 MMOL/L (ref 98–107)
CO2: 22 MMOL/L (ref 20–31)
CO2: 24 MMOL/L (ref 20–31)
CREAT SERPL-MCNC: 1.6 MG/DL (ref 0.7–1.2)
CREAT SERPL-MCNC: 1.79 MG/DL (ref 0.7–1.2)
EKG ATRIAL RATE: 105 BPM
EKG P AXIS: 40 DEGREES
EKG P-R INTERVAL: 226 MS
EKG Q-T INTERVAL: 370 MS
EKG QRS DURATION: 100 MS
EKG QTC CALCULATION (BAZETT): 489 MS
EKG R AXIS: 12 DEGREES
EKG T AXIS: 79 DEGREES
EKG VENTRICULAR RATE: 105 BPM
GFR AFRICAN AMERICAN: 44 ML/MIN
GFR AFRICAN AMERICAN: 50 ML/MIN
GFR NON-AFRICAN AMERICAN: 36 ML/MIN
GFR NON-AFRICAN AMERICAN: 41 ML/MIN
GFR SERPL CREATININE-BSD FRML MDRD: ABNORMAL ML/MIN/{1.73_M2}
GLUCOSE BLD-MCNC: 88 MG/DL (ref 70–99)
GLUCOSE BLD-MCNC: 91 MG/DL (ref 70–99)
HCT VFR BLD CALC: 39.1 % (ref 40.7–50.3)
HEMOGLOBIN: 12.6 G/DL (ref 13–17)
MCH RBC QN AUTO: 30.1 PG (ref 25.2–33.5)
MCHC RBC AUTO-ENTMCNC: 32.2 G/DL (ref 28.4–34.8)
MCV RBC AUTO: 93.5 FL (ref 82.6–102.9)
PARTIAL THROMBOPLASTIN TIME: 24.3 SEC (ref 21.3–31.3)
PARTIAL THROMBOPLASTIN TIME: 30.3 SEC (ref 21.3–31.3)
PARTIAL THROMBOPLASTIN TIME: 37 SEC (ref 21.3–31.3)
PDW BLD-RTO: 14.3 % (ref 11.8–14.4)
PLATELET # BLD: ABNORMAL K/UL (ref 138–453)
PLATELET, FLUORESCENCE: 117 K/UL (ref 138–453)
PLATELET, IMMATURE FRACTION: 4.2 % (ref 1.1–10.3)
PMV BLD AUTO: ABNORMAL FL (ref 8.1–13.5)
POTASSIUM SERPL-SCNC: 3.8 MMOL/L (ref 3.7–5.3)
POTASSIUM SERPL-SCNC: 3.9 MMOL/L (ref 3.7–5.3)
RBC # BLD: 4.18 M/UL (ref 4.21–5.77)
SODIUM BLD-SCNC: 137 MMOL/L (ref 135–144)
SODIUM BLD-SCNC: 140 MMOL/L (ref 135–144)
WBC # BLD: 11.8 K/UL (ref 3.5–11.3)

## 2017-12-29 PROCEDURE — 2500000003 HC RX 250 WO HCPCS: Performed by: INTERNAL MEDICINE

## 2017-12-29 PROCEDURE — S0028 INJECTION, FAMOTIDINE, 20 MG: HCPCS | Performed by: INTERNAL MEDICINE

## 2017-12-29 PROCEDURE — 6360000002 HC RX W HCPCS: Performed by: STUDENT IN AN ORGANIZED HEALTH CARE EDUCATION/TRAINING PROGRAM

## 2017-12-29 PROCEDURE — C1725 CATH, TRANSLUMIN NON-LASER: HCPCS | Performed by: INTERNAL MEDICINE

## 2017-12-29 PROCEDURE — 94762 N-INVAS EAR/PLS OXIMTRY CONT: CPT

## 2017-12-29 PROCEDURE — 1200000000 HC SEMI PRIVATE

## 2017-12-29 PROCEDURE — C1713 ANCHOR/SCREW BN/BN,TIS/BN: HCPCS | Performed by: INTERNAL MEDICINE

## 2017-12-29 PROCEDURE — 6360000002 HC RX W HCPCS: Performed by: NURSE ANESTHETIST, CERTIFIED REGISTERED

## 2017-12-29 PROCEDURE — 36415 COLL VENOUS BLD VENIPUNCTURE: CPT

## 2017-12-29 PROCEDURE — 7100000000 HC PACU RECOVERY - FIRST 15 MIN: Performed by: INTERNAL MEDICINE

## 2017-12-29 PROCEDURE — 6360000002 HC RX W HCPCS: Performed by: ANESTHESIOLOGY

## 2017-12-29 PROCEDURE — 7100000001 HC PACU RECOVERY - ADDTL 15 MIN: Performed by: INTERNAL MEDICINE

## 2017-12-29 PROCEDURE — 74181 MRI ABDOMEN W/O CONTRAST: CPT

## 2017-12-29 PROCEDURE — 6360000002 HC RX W HCPCS: Performed by: INTERNAL MEDICINE

## 2017-12-29 PROCEDURE — 80048 BASIC METABOLIC PNL TOTAL CA: CPT

## 2017-12-29 PROCEDURE — 85027 COMPLETE CBC AUTOMATED: CPT

## 2017-12-29 PROCEDURE — 74330 X-RAY BILE/PANC ENDOSCOPY: CPT

## 2017-12-29 PROCEDURE — 99232 SBSQ HOSP IP/OBS MODERATE 35: CPT | Performed by: INTERNAL MEDICINE

## 2017-12-29 PROCEDURE — 2720000010 HC SURG SUPPLY STERILE: Performed by: INTERNAL MEDICINE

## 2017-12-29 PROCEDURE — C1726 CATH, BAL DIL, NON-VASCULAR: HCPCS | Performed by: INTERNAL MEDICINE

## 2017-12-29 PROCEDURE — 43262 ENDO CHOLANGIOPANCREATOGRAPH: CPT | Performed by: INTERNAL MEDICINE

## 2017-12-29 PROCEDURE — 85730 THROMBOPLASTIN TIME PARTIAL: CPT

## 2017-12-29 PROCEDURE — 6360000004 HC RX CONTRAST MEDICATION: Performed by: INTERNAL MEDICINE

## 2017-12-29 PROCEDURE — 2500000003 HC RX 250 WO HCPCS: Performed by: NURSE ANESTHETIST, CERTIFIED REGISTERED

## 2017-12-29 PROCEDURE — 3700000001 HC ADD 15 MINUTES (ANESTHESIA): Performed by: INTERNAL MEDICINE

## 2017-12-29 PROCEDURE — 2580000003 HC RX 258: Performed by: NURSE ANESTHETIST, CERTIFIED REGISTERED

## 2017-12-29 PROCEDURE — 6370000000 HC RX 637 (ALT 250 FOR IP): Performed by: INTERNAL MEDICINE

## 2017-12-29 PROCEDURE — 3609018800 HC ERCP DX COLLECTION SPECIMEN BRUSHING/WASHING: Performed by: INTERNAL MEDICINE

## 2017-12-29 PROCEDURE — 76377 3D RENDER W/INTRP POSTPROCES: CPT

## 2017-12-29 PROCEDURE — 43277 ERCP EA DUCT/AMPULLA DILATE: CPT | Performed by: INTERNAL MEDICINE

## 2017-12-29 PROCEDURE — 93005 ELECTROCARDIOGRAM TRACING: CPT

## 2017-12-29 PROCEDURE — 2580000003 HC RX 258: Performed by: INTERNAL MEDICINE

## 2017-12-29 PROCEDURE — 3700000000 HC ANESTHESIA ATTENDED CARE: Performed by: INTERNAL MEDICINE

## 2017-12-29 PROCEDURE — 0F798ZZ DILATION OF COMMON BILE DUCT, VIA NATURAL OR ARTIFICIAL OPENING ENDOSCOPIC: ICD-10-PCS | Performed by: INTERNAL MEDICINE

## 2017-12-29 RX ORDER — FENTANYL CITRATE 50 UG/ML
25 INJECTION, SOLUTION INTRAMUSCULAR; INTRAVENOUS EVERY 5 MIN PRN
Status: DISCONTINUED | OUTPATIENT
Start: 2017-12-29 | End: 2017-12-29 | Stop reason: HOSPADM

## 2017-12-29 RX ORDER — ROCURONIUM BROMIDE 10 MG/ML
INJECTION, SOLUTION INTRAVENOUS PRN
Status: DISCONTINUED | OUTPATIENT
Start: 2017-12-29 | End: 2017-12-29 | Stop reason: SDUPTHER

## 2017-12-29 RX ORDER — ONDANSETRON 2 MG/ML
INJECTION INTRAMUSCULAR; INTRAVENOUS PRN
Status: DISCONTINUED | OUTPATIENT
Start: 2017-12-29 | End: 2017-12-29 | Stop reason: SDUPTHER

## 2017-12-29 RX ORDER — SODIUM CHLORIDE, SODIUM LACTATE, POTASSIUM CHLORIDE, CALCIUM CHLORIDE 600; 310; 30; 20 MG/100ML; MG/100ML; MG/100ML; MG/100ML
INJECTION, SOLUTION INTRAVENOUS CONTINUOUS PRN
Status: DISCONTINUED | OUTPATIENT
Start: 2017-12-29 | End: 2017-12-29 | Stop reason: SDUPTHER

## 2017-12-29 RX ORDER — PROPOFOL 10 MG/ML
INJECTION, EMULSION INTRAVENOUS PRN
Status: DISCONTINUED | OUTPATIENT
Start: 2017-12-29 | End: 2017-12-29 | Stop reason: SDUPTHER

## 2017-12-29 RX ORDER — ONDANSETRON 2 MG/ML
4 INJECTION INTRAMUSCULAR; INTRAVENOUS
Status: COMPLETED | OUTPATIENT
Start: 2017-12-29 | End: 2017-12-29

## 2017-12-29 RX ORDER — GLYCOPYRROLATE 0.2 MG/ML
INJECTION INTRAMUSCULAR; INTRAVENOUS PRN
Status: DISCONTINUED | OUTPATIENT
Start: 2017-12-29 | End: 2017-12-29 | Stop reason: SDUPTHER

## 2017-12-29 RX ORDER — LIDOCAINE HYDROCHLORIDE 10 MG/ML
INJECTION, SOLUTION EPIDURAL; INFILTRATION; INTRACAUDAL; PERINEURAL PRN
Status: DISCONTINUED | OUTPATIENT
Start: 2017-12-29 | End: 2017-12-29 | Stop reason: SDUPTHER

## 2017-12-29 RX ADMIN — AMLODIPINE BESYLATE 5 MG: 5 TABLET ORAL at 09:58

## 2017-12-29 RX ADMIN — DOCUSATE SODIUM 100 MG: 100 CAPSULE, LIQUID FILLED ORAL at 09:58

## 2017-12-29 RX ADMIN — Medication 81 MG: at 09:58

## 2017-12-29 RX ADMIN — FAMOTIDINE 20 MG: 10 INJECTION, SOLUTION INTRAVENOUS at 22:49

## 2017-12-29 RX ADMIN — BETHANECHOL CHLORIDE 25 MG: 25 TABLET ORAL at 09:58

## 2017-12-29 RX ADMIN — DOCUSATE SODIUM 100 MG: 100 CAPSULE, LIQUID FILLED ORAL at 22:50

## 2017-12-29 RX ADMIN — HEPARIN SODIUM AND DEXTROSE 11.82 UNITS/KG/HR: 10000; 5 INJECTION INTRAVENOUS at 01:38

## 2017-12-29 RX ADMIN — BETHANECHOL CHLORIDE 25 MG: 25 TABLET ORAL at 22:50

## 2017-12-29 RX ADMIN — TAMSULOSIN HYDROCHLORIDE 0.4 MG: 0.4 CAPSULE ORAL at 09:58

## 2017-12-29 RX ADMIN — PROPOFOL 100 MG: 10 INJECTION, EMULSION INTRAVENOUS at 15:19

## 2017-12-29 RX ADMIN — HEPARIN SODIUM 2000 UNITS: 1000 INJECTION, SOLUTION INTRAVENOUS; SUBCUTANEOUS at 01:36

## 2017-12-29 RX ADMIN — ROCURONIUM BROMIDE 30 MG: 10 INJECTION INTRAVENOUS at 15:19

## 2017-12-29 RX ADMIN — ONDANSETRON 4 MG: 2 INJECTION INTRAMUSCULAR; INTRAVENOUS at 15:40

## 2017-12-29 RX ADMIN — Medication 10 ML: at 09:57

## 2017-12-29 RX ADMIN — MORPHINE SULFATE 2 MG: 2 INJECTION, SOLUTION INTRAMUSCULAR; INTRAVENOUS at 18:22

## 2017-12-29 RX ADMIN — METOPROLOL TARTRATE 12.5 MG: 25 TABLET ORAL at 22:49

## 2017-12-29 RX ADMIN — LIDOCAINE HYDROCHLORIDE 100 MG: 10 INJECTION, SOLUTION EPIDURAL; INFILTRATION; INTRACAUDAL; PERINEURAL at 15:19

## 2017-12-29 RX ADMIN — GLYCOPYRROLATE 0.4 MG: 0.2 INJECTION INTRAMUSCULAR; INTRAVENOUS at 15:51

## 2017-12-29 RX ADMIN — SIMVASTATIN 20 MG: 20 TABLET, FILM COATED ORAL at 22:49

## 2017-12-29 RX ADMIN — HEPARIN SODIUM AND DEXTROSE 13.82 UNITS/KG/HR: 10000; 5 INJECTION INTRAVENOUS at 18:22

## 2017-12-29 RX ADMIN — HEPARIN SODIUM 2000 UNITS: 1000 INJECTION, SOLUTION INTRAVENOUS; SUBCUTANEOUS at 10:08

## 2017-12-29 RX ADMIN — FAMOTIDINE 20 MG: 10 INJECTION, SOLUTION INTRAVENOUS at 09:58

## 2017-12-29 RX ADMIN — NEOSTIGMINE METHYLSULFATE 3 MG: 1 INJECTION, SOLUTION INTRAMUSCULAR; INTRAVENOUS; SUBCUTANEOUS at 15:51

## 2017-12-29 RX ADMIN — LEVOFLOXACIN 500 MG: 5 INJECTION, SOLUTION INTRAVENOUS at 11:50

## 2017-12-29 RX ADMIN — SODIUM CHLORIDE, POTASSIUM CHLORIDE, SODIUM LACTATE AND CALCIUM CHLORIDE: 600; 310; 30; 20 INJECTION, SOLUTION INTRAVENOUS at 15:13

## 2017-12-29 RX ADMIN — ONDANSETRON 4 MG: 2 INJECTION INTRAMUSCULAR; INTRAVENOUS at 16:41

## 2017-12-29 RX ADMIN — SODIUM CHLORIDE: 9 INJECTION, SOLUTION INTRAVENOUS at 18:17

## 2017-12-29 ASSESSMENT — PULMONARY FUNCTION TESTS
PIF_VALUE: 20
PIF_VALUE: 26
PIF_VALUE: 17
PIF_VALUE: 18
PIF_VALUE: 17
PIF_VALUE: 18
PIF_VALUE: 19
PIF_VALUE: 21
PIF_VALUE: 25
PIF_VALUE: 2
PIF_VALUE: 1
PIF_VALUE: 17
PIF_VALUE: 18
PIF_VALUE: 18
PIF_VALUE: 2
PIF_VALUE: 2
PIF_VALUE: 18
PIF_VALUE: 23
PIF_VALUE: 17
PIF_VALUE: 19
PIF_VALUE: 7
PIF_VALUE: 1
PIF_VALUE: 17
PIF_VALUE: 18
PIF_VALUE: 25
PIF_VALUE: 3
PIF_VALUE: 1
PIF_VALUE: 18
PIF_VALUE: 24
PIF_VALUE: 39
PIF_VALUE: 19
PIF_VALUE: 18
PIF_VALUE: 18
PIF_VALUE: 23
PIF_VALUE: 17
PIF_VALUE: 25
PIF_VALUE: 18
PIF_VALUE: 8
PIF_VALUE: 1
PIF_VALUE: 19
PIF_VALUE: 17
PIF_VALUE: 18
PIF_VALUE: 25
PIF_VALUE: 5
PIF_VALUE: 19
PIF_VALUE: 18
PIF_VALUE: 23
PIF_VALUE: 9

## 2017-12-29 ASSESSMENT — PAIN SCALES - GENERAL
PAINLEVEL_OUTOF10: 6
PAINLEVEL_OUTOF10: 0
PAINLEVEL_OUTOF10: 0

## 2017-12-29 ASSESSMENT — ENCOUNTER SYMPTOMS: SHORTNESS OF BREATH: 1

## 2017-12-29 NOTE — ANESTHESIA PRE PROCEDURE
Department of Anesthesiology  Preprocedure Note  Department of Anesthesiology  Preprocedure Note       Name:  Yoly Richard   Age:  80 y.o.  :  1933                                          MRN:  [id number]         Date:  2017      Surgeon: Mike Moyer):  Leola Litten, MD    Procedure: Procedure(s):  ERCP ENDOSCOPIC RETROGRADE CHOLANGIOPANCREATOGRAPHY    Medications prior to admission:   Prior to Admission medications    Medication Sig Start Date End Date Taking? Authorizing Provider   bethanechol (URECHOLINE) 25 MG tablet Take 1 tablet by mouth every 6 hours 17   Angelique Cooper MD   apixaban Quentin Hue) 2.5 MG TABS tablet Take 1 tablet by mouth 2 times daily 17   Giovanny Duffy MD   amLODIPine [facility]) 5 MG tablet Take 1 tablet by mouth daily 17   Giovanny Duffy MD   metoprolol succinate (TOPROL XL) 25 MG extended release tablet Take 0.5 tablets by mouth nightly 17   Giovanny Duffy MD   furosemide (LASIX) 40 MG tablet TAKE 1 TABLET DAILY 11/3/17   Yulia Wen NP   lisinopril (PRINIVIL;ZESTRIL) 20 MG tablet Take 1 tablet by mouth daily MUST HAVE APPOINTMENT WITH DR. Ram FOR ANY ADDITIONAL REFILLS!! 11/3/17   Yulia Wen NP   tamsulosin (FLOMAX) 0.4 MG capsule TAKE 1 CAPSULE DAILY 11/3/17   Yulia Wen NP   simvastatin (ZOCOR) 20 MG tablet TAKE 1 TABLET NIGHTLY 17   Angelique Cooper MD   metoprolol tartrate (LOPRESSOR) 25 MG tablet Take 0.5 tablets by mouth nightly 17   Alla Mckinney MD   amiodarone (CORDARONE) 200 MG tablet Take 1 tablet by mouth daily. 1/7/15 5/4/16  Mark Schreiber MD   aspirin 81 MG tablet Take 81 mg by mouth daily.     Historical Provider, MD       Current medications:    Current Facility-Administered Medications   Medication Dose Route Frequency Provider Last Rate Last Dose    [MAR Hold] 0.9 % sodium chloride infusion   Intravenous Continuous Virender Abbott Schirmer,  mL/hr at 17 1900      % injection 10 mL  10 mL Intravenous 2 times per day Joseph Rodriguez MD   10 mL at 12/29/17 0957    [MAR Hold] sodium chloride flush 0.9 % injection 10 mL  10 mL Intravenous PRN Joseph Rodriguez MD        [facility] Hold] potassium chloride (KLOR-CON M) extended release tablet 40 mEq  40 mEq Oral PRN Joseph Rodriguez MD        Or   [facility] Hold] potassium chloride 20 MEQ/15ML (10%) oral solution 40 mEq  40 mEq Oral PRN Joseph Rodriguez MD        Or   [facility] Hold] potassium chloride 10 mEq/100 mL IVPB (Peripheral Line)  10 mEq Intravenous PRN Joseph Rodriguez MD        [facility] Hold] potassium chloride 10 mEq/100 mL IVPB (Peripheral Line)  10 mEq Intravenous PRN Joseph Rodriguez MD        [facility] Hold] magnesium sulfate 1 g in dextrose 5% 100 mL IVPB  1 g Intravenous PRN Joseph Rodriguez MD        [facility] Hold] acetaminophen (TYLENOL) tablet 650 mg  650 mg Oral Q4H PRN Joseph Rodriguez MD        [facility] Hold] HYDROcodone-acetaminophen [facility]) 5-325 MG per tablet 1 tablet  1 tablet Oral Q4H PRN Joseph Rodriguez MD        [facility] Hold] morphine injection 2 mg  2 mg Intravenous Q2H PRN Tia Pinzon MD   2 mg at 12/29/17 1822    Or    [MAR Hold] morphine (PF) injection 4 mg  4 mg Intravenous Q2H PRN Joseph Rodriguez MD        [facility] Hold] magnesium hydroxide (MILK OF MAGNESIA) 400 MG/5ML suspension 30 mL  30 mL Oral Daily PRN Joseph Rodriguez MD        [facility] Hold] docusate sodium (COLACE) capsule 100 mg  100 mg Oral BID Tia Pinzon MD   100 mg at 12/29/17 2250    [MAR Hold] bisacodyl (DULCOLAX) suppository 10 mg  10 mg Rectal Daily PRN Joseph Rodriguez MD        [facility] Hold] ondansetron [facility]) injection 4 mg  4 mg Intravenous Q6H PRN Tia Pinzon MD   4 mg at 12/28/17 0100    [MAR Hold] nitroGLYCERIN (NITROSTAT) SL tablet 0.4 mg  0.4 mg Sublingual Q5 Min PRN Joseph Rodriguez MD        [facility] Hold] famotidine (PEPCID) injection 20 mg  20 mg Intravenous BID Tia Pinzon MD   20 mg at 12/29/17 9408       Allergies:  No History   Substance Use Topics    Smoking status: Former Smoker     Packs/day: 0.50     Types: Cigarettes     Quit date: 12/10/2004    Smokeless tobacco: Never Used      Comment: Marcin Fly RRT 10/22/17    Alcohol use Yes      Comment: occassional                                Counseling given: Not Answered      Vital Signs (Current):   Vitals:    12/30/17 0022 12/30/17 0425 12/30/17 0742 12/30/17 0923   BP: 122/70 (!) 112/52 (!) 120/57    Pulse: 86  64    Resp: 20  18    Temp: 98.1 °F (36.7 °C)  98.4 °F (36.9 °C)    TempSrc: Oral  Oral    SpO2: 96%  99%    Weight:    232 lb (105.2 kg)   Height:    5' 10\" (1.778 m)                                              BP Readings from Last 3 Encounters:   12/30/17 (!) 120/57   12/29/17 (!) 154/97   12/27/17 (!) 208/88       NPO Status: Time of last liquid consumption: 0900                        Time of last solid consumption: 0900                        Date of last liquid consumption: 12/26/17                        Date of last solid food consumption: 12/26/17    BMI:   Wt Readings from Last 3 Encounters:   12/30/17 232 lb (105.2 kg)   12/27/17 220 lb (99.8 kg)   11/22/17 224 lb (101.6 kg)     Body mass index is 33.29 kg/m². CBC:   Lab Results   Component Value Date    WBC 11.8 12/29/2017    RBC 4.18 12/29/2017    HGB 12.6 12/29/2017    HCT 39.1 12/29/2017    MCV 93.5 12/29/2017    RDW 14.3 12/29/2017     12/30/2017       CMP:   Lab Results   Component Value Date     12/30/2017    K 4.1 12/30/2017     12/30/2017    CO2 26 12/30/2017    BUN 38 12/30/2017    CREATININE 1.66 12/30/2017    GFRAA 48 12/30/2017    LABGLOM 40 12/30/2017    GLUCOSE 99 12/30/2017    PROT 6.8 12/28/2017    CALCIUM 8.3 12/30/2017    BILITOT 1.27 12/28/2017    ALKPHOS 75 12/28/2017    AST 17 12/28/2017    ALT 11 12/28/2017       POC Tests: No results for input(s): POCGLU, POCNA, POCK, POCCL, POCBUN, POCHEMO, POCHCT in the last 72 hours.     Coags:   Lab Results   Component

## 2017-12-29 NOTE — CONSULTS
12/28/2017  EXAMINATION: RIGHT UPPER QUADRANT ULTRASOUND 12/28/2017 8:00 am COMPARISON: CT abdomen and pelvis October 27, 2014 HISTORY: ORDERING SYSTEM PROVIDED HISTORY: ABDOMINAL PAIN FINDINGS: LIVER:  The visualized portions of the liver demonstrates normal echogenicity without evidence of intrahepatic biliary ductal dilatation. Liver cysts are present. BILIARY SYSTEM:  There is a large gallstone this is lodged in the gallbladder neck/ fundus. Diffuse gallbladder wall thickening and trace pericholecystic fluid present. Echogenic foci arising from the gallbladder wall with comet tail artifact most consistent with cholesterolosis/adenomyomatosis. The sonographer reports positive Zapien's sign upon transducer pressure over the gallbladder. The common bile duct is markedly dilated measuring up to 15 mm. No significant intrahepatic biliary ductal dilatation. However, the pancreatic duct is mildly dilated measuring up to 4 mm. RIGHT KIDNEY: Right lower pole renal cyst noted. OTHER: No evidence of right upper quadrant ascites. Findings consistent with acute cholecystitis in the appropriate clinical setting. Dilated common bile and pancreatic ducts. Further evaluation with year CP/MRCP could be performed as clinically indicated. Xr Chest Portable    Result Date: 12/27/2017  EXAMINATION: SINGLE VIEW OF THE CHEST 12/27/2017 8:06 pm COMPARISON: December 27, 2017 HISTORY: ORDERING SYSTEM PROVIDED HISTORY: chf TECHNOLOGIST PROVIDED HISTORY: Reason for exam:->chf FINDINGS: Cardiomegaly noted there has been median sternotomy. Aortic arch is mildly ectatic and partially calcified. Lung fields are well aerated. There is no evidence of infiltrates signs of congestion. No sizable pleural effusions identified. No acute cardiopulmonary disease.      Mri Abdomen Wo Contrast Mrcp    Result Date: 12/29/2017  EXAMINATION: MRI OF THE ABDOMEN WITHOUT CONTRAST AND MRCP 12/29/2017 8:24 am TECHNIQUE: Multiplanar

## 2017-12-29 NOTE — PLAN OF CARE
James Soliz 19    Second Visit Note  For more detailed information please refer to the progress note of the day      12/29/2017    6:10 PM    Name:   Ethan Levine  MRN:     [id number]     [place]:      [de-identified]   Room:   [address] [place] [address] Day:  2  Admit Date:  12/27/2017  4:18 PM    PCP:   Isiah Wilson MD  Code Status:  Full Code        Pt vitals were reviewed   New labs were reviewed   Patient was seen    Updated plan :     1. Had ERCP done today with the following results   Dilated CBD,Ampullary stenosis ,No CBD stone  2. He will go for laparoscopic cholecystectomy tomorrow  3.      Restart heparin drip without bolus for now which has to be stopped at midnight as recommended by surgical team        Jimmy Go MD  12/29/2017  6:10 PM

## 2017-12-29 NOTE — ANESTHESIA POSTPROCEDURE EVALUATION
Department of Anesthesiology  Postprocedure Note    Patient: Jac Moreno  MRN: [id number]  YOB: 1933  Date of evaluation: 12/29/2017  Time:  5:29 PM     Procedure Summary     Date:  12/29/17 Room / Location:  [place] [address] / [facility] OR    Anesthesia Start:  0508 Anesthesia Stop:  7596    Procedures:       ERCP ENDOSCOPIC RETROGRADE CHOLANGIOPANCREATOGRAPHY (N/A )      DUODENAL SPHINCTEROPLASTY      ERCP DILATION BALLOON Diagnosis:  (COMMON BILE DUCT STONE)    Surgeon:  Lovett Fabry, MD Responsible Provider:  Nadiya Carlisle MD    Anesthesia Type:  general ASA Status:  3          Anesthesia Type: general    Robson Phase I: Robson Score: 9    Robson Phase II:      Last vitals: Reviewed and per EMR flowsheets.        Anesthesia Post Evaluation    Patient location during evaluation: PACU  Patient participation: complete - patient participated  Level of consciousness: awake and alert  Pain score: 1  Airway patency: patent  Nausea & Vomiting: no nausea and no vomiting  Complications: no  Cardiovascular status: hemodynamically stable  Respiratory status: acceptable  Hydration status: euvolemic

## 2017-12-29 NOTE — OP NOTE
Scientific sphincterotome preloaded with a 0.035\" guidewire. A Cholangiogram showed diffusely dilated CBD with no obvious filling defect. Biliary sphincterotomy was performed. Drainage was suboptimal. The biliary os and distal CBD were balloon dilated to 12 mm. Good drainage was noted. The PD was not cannulated or contrast injected during this procedure. Final fluoroscopic views of the hemidiaphragm revealed no air. The patient was extubated and returned to the recovery area in good condition having tolerated the procedure well. RECOMMENDATIONS:   1.  Transfer back to the floor for further management as per primary care team.          Adams Machuca MD Enoch Saint

## 2017-12-29 NOTE — PROGRESS NOTES
Infusions:    sodium chloride 100 mL/hr at 17 1900    heparin (porcine) Stopped (17 1320)     PRN Meds: heparin (porcine), heparin (porcine), sodium chloride flush, potassium chloride **OR** potassium chloride **OR** potassium chloride, potassium chloride, magnesium sulfate, acetaminophen, HYDROcodone 5 mg - acetaminophen, morphine **OR** morphine, magnesium hydroxide, bisacodyl, ondansetron, nitroGLYCERIN    Data:     Past Medical History:   has a past medical history of Aneurysm of infrarenal abdominal aorta ([facility] Atrial fibrillation ([facility] BPH (benign prostatic hyperplasia); CAD (coronary artery disease); Cerebrovascular disease; [facility] Elevated PSA; Hyperlipidemia; Hyperopia with astigmatism and presbyopia; and Hypertension. Social History:   reports that he quit smoking about 13 years ago. His smoking use included Cigarettes. He smoked 0.50 packs per day. He has never used smokeless tobacco. He reports that he drinks alcohol. He reports that he does not use drugs. Family History:   Family History   Problem Relation Age of Onset    Cancer Sister      ?  Cancer Sister      lung     Glaucoma Neg Hx        Vitals:  BP (!) 118/50   Pulse 71   Temp 98 °F (36.7 °C) (Oral)   Resp 22   Ht 5' 10\" (1.778 m)   Wt 232 lb (105.2 kg)   SpO2 94%   BMI 33.29 kg/m²   Temp (24hrs), Av.1 °F (36.7 °C), Min:98 °F (36.7 °C), Max:98.4 °F (36.9 °C)    No results for input(s): POCGLU in the last 72 hours. I/O (24Hr):     Intake/Output Summary (Last 24 hours) at 17 1421  Last data filed at 17 1407   Gross per 24 hour   Intake             2174 ml   Output              900 ml   Net             1274 ml       Labs:    Hematology:  Recent Labs      17   0549  17   0629  17   0712   WBC  14.2*  16.5*  11.8*   HGB  15.8  14.8  12.6*   HCT  47.4  46.0  39.1*   PLT  148  See Reflexed IPF Result  See Reflexed IPF Result     Chemistry:  Recent Labs 12/27/17   0549  12/28/17   0629  12/28/17   1353  12/29/17   0712   NA  136  134*   --   140   K  4.1  4.1   --   3.9   CL  95*  98   --   103   CO2  28  21   --   22   GLUCOSE  146*  109*   --   91   BUN  17  27*  33*  42*   CREATININE  1.10  1.49*  1.70*  1.79*   ANIONGAP  13  15   --   15   LABGLOM  >60  45*  39*  36*   GFRAA  >60  54*  47*  44*   CALCIUM  9.6  8.6   --   8.2*   MYOGLOBIN  98*   --    --    --      Recent Labs      12/28/17   0629   PROT  6.8   LABALBU  3.5   AST  17   ALT  11   ALKPHOS  75   BILITOT  1.27*   CHOL  119   HDL  58   LDLCHOLESTEROL  48   CHOLHDLRATIO  2.1   TRIG  63   VLDL  NOT REPORTED         Lab Results   Component Value Date/Time    SPECIAL NOT REPORTED 10/22/2017 05:00 PM     Lab Results   Component Value Date/Time    CULTURE NO GROWTH 6 DAYS 10/22/2017 05:00 PM    CULTURE  10/22/2017 05:00 PM     Performed at [address] [place] [address], [address] [place] [address] [phone]       Lab Results   Component Value Date    POCPH 7.44 11/22/2014    POCPCO2 39 11/22/2014    POCPO2 66 11/22/2014    POCHCO3 26.5 11/22/2014    NBEA NOT REPORTED 11/22/2014    PBEA 2 11/22/2014    YJL4PST 28 11/22/2014    NKYN1ZKR 93 11/22/2014    FIO2 21.0 11/22/2014       Radiology:  Gallbladder ultrasound  Findings consistent with acute cholecystitis in the appropriate clinical   setting.       Dilated common bile and pancreatic ducts.  Further evaluation with year   CP/MRCP could be performed as clinically indicated. MRCP  *Dilatation of the common bile duct with abrupt cut off at the level of the   ampulla which may represent an impacted stone.  There is associated mild   diffuse pancreatic duct dilatation.  Further evaluation with ERCP is   recommended. *Large stone involving the gallbladder neck with associated gallbladder wall   thickening and small amount of pericholecystic fluid, similar to the   ultrasound study.  Developing acute cholecystitis cannot be excluded.    *Presumed liver and

## 2017-12-30 ENCOUNTER — ANESTHESIA (OUTPATIENT)
Dept: OPERATING ROOM | Age: 82
DRG: 418 | End: 2017-12-30
Payer: MEDICARE

## 2017-12-30 ENCOUNTER — ANESTHESIA EVENT (OUTPATIENT)
Dept: OPERATING ROOM | Age: 82
DRG: 418 | End: 2017-12-30
Payer: MEDICARE

## 2017-12-30 VITALS — TEMPERATURE: 98.3 F | OXYGEN SATURATION: 98 % | DIASTOLIC BLOOD PRESSURE: 67 MMHG | SYSTOLIC BLOOD PRESSURE: 147 MMHG

## 2017-12-30 LAB
-: NORMAL
AMORPHOUS: NORMAL
ANION GAP SERPL CALCULATED.3IONS-SCNC: 11 MMOL/L (ref 9–17)
BACTERIA: NORMAL
BILIRUBIN URINE: NEGATIVE
BUN BLDV-MCNC: 38 MG/DL (ref 8–23)
BUN/CREAT BLD: ABNORMAL (ref 9–20)
CALCIUM SERPL-MCNC: 8.3 MG/DL (ref 8.6–10.4)
CASTS UA: NORMAL /LPF (ref 0–8)
CHLORIDE BLD-SCNC: 102 MMOL/L (ref 98–107)
CO2: 26 MMOL/L (ref 20–31)
COLOR: ABNORMAL
COMMENT UA: ABNORMAL
CREAT SERPL-MCNC: 1.66 MG/DL (ref 0.7–1.2)
CRYSTALS, UA: NORMAL /HPF
EPITHELIAL CELLS UA: NORMAL /HPF (ref 0–5)
GFR AFRICAN AMERICAN: 48 ML/MIN
GFR NON-AFRICAN AMERICAN: 40 ML/MIN
GFR SERPL CREATININE-BSD FRML MDRD: ABNORMAL ML/MIN/{1.73_M2}
GFR SERPL CREATININE-BSD FRML MDRD: ABNORMAL ML/MIN/{1.73_M2}
GLUCOSE BLD-MCNC: 99 MG/DL (ref 70–99)
GLUCOSE URINE: NEGATIVE
KETONES, URINE: ABNORMAL
LEUKOCYTE ESTERASE, URINE: NEGATIVE
MUCUS: NORMAL
NITRITE, URINE: NEGATIVE
OTHER OBSERVATIONS UA: NORMAL
PH UA: 5 (ref 5–8)
PLATELET # BLD: 124 K/UL (ref 138–453)
POTASSIUM SERPL-SCNC: 4.1 MMOL/L (ref 3.7–5.3)
PROTEIN UA: ABNORMAL
RBC UA: NORMAL /HPF (ref 0–4)
RENAL EPITHELIAL, UA: NORMAL /HPF
SODIUM BLD-SCNC: 139 MMOL/L (ref 135–144)
SPECIFIC GRAVITY UA: 1.02 (ref 1–1.03)
TRICHOMONAS: NORMAL
TURBIDITY: CLEAR
URINE HGB: NEGATIVE
UROBILINOGEN, URINE: NORMAL
WBC UA: NORMAL /HPF (ref 0–5)
YEAST: NORMAL

## 2017-12-30 PROCEDURE — 2580000003 HC RX 258: Performed by: NURSE ANESTHETIST, CERTIFIED REGISTERED

## 2017-12-30 PROCEDURE — 2500000003 HC RX 250 WO HCPCS: Performed by: NURSE ANESTHETIST, CERTIFIED REGISTERED

## 2017-12-30 PROCEDURE — 85049 AUTOMATED PLATELET COUNT: CPT

## 2017-12-30 PROCEDURE — 81001 URINALYSIS AUTO W/SCOPE: CPT

## 2017-12-30 PROCEDURE — 6360000002 HC RX W HCPCS: Performed by: ANESTHESIOLOGY

## 2017-12-30 PROCEDURE — 2580000003 HC RX 258: Performed by: SURGERY

## 2017-12-30 PROCEDURE — 2500000003 HC RX 250 WO HCPCS: Performed by: SURGERY

## 2017-12-30 PROCEDURE — 1200000000 HC SEMI PRIVATE

## 2017-12-30 PROCEDURE — 99232 SBSQ HOSP IP/OBS MODERATE 35: CPT | Performed by: INTERNAL MEDICINE

## 2017-12-30 PROCEDURE — 6370000000 HC RX 637 (ALT 250 FOR IP): Performed by: NURSE ANESTHETIST, CERTIFIED REGISTERED

## 2017-12-30 PROCEDURE — 3700000000 HC ANESTHESIA ATTENDED CARE: Performed by: SURGERY

## 2017-12-30 PROCEDURE — S0028 INJECTION, FAMOTIDINE, 20 MG: HCPCS | Performed by: INTERNAL MEDICINE

## 2017-12-30 PROCEDURE — 6360000002 HC RX W HCPCS: Performed by: NURSE ANESTHETIST, CERTIFIED REGISTERED

## 2017-12-30 PROCEDURE — 6360000002 HC RX W HCPCS: Performed by: INTERNAL MEDICINE

## 2017-12-30 PROCEDURE — 3600000014 HC SURGERY LEVEL 4 ADDTL 15MIN: Performed by: SURGERY

## 2017-12-30 PROCEDURE — 6370000000 HC RX 637 (ALT 250 FOR IP): Performed by: INTERNAL MEDICINE

## 2017-12-30 PROCEDURE — 88304 TISSUE EXAM BY PATHOLOGIST: CPT

## 2017-12-30 PROCEDURE — 2580000003 HC RX 258: Performed by: INTERNAL MEDICINE

## 2017-12-30 PROCEDURE — 80048 BASIC METABOLIC PNL TOTAL CA: CPT

## 2017-12-30 PROCEDURE — 7100000001 HC PACU RECOVERY - ADDTL 15 MIN: Performed by: SURGERY

## 2017-12-30 PROCEDURE — 3600000004 HC SURGERY LEVEL 4 BASE: Performed by: SURGERY

## 2017-12-30 PROCEDURE — 2500000003 HC RX 250 WO HCPCS: Performed by: INTERNAL MEDICINE

## 2017-12-30 PROCEDURE — 3700000001 HC ADD 15 MINUTES (ANESTHESIA): Performed by: SURGERY

## 2017-12-30 PROCEDURE — 0FT44ZZ RESECTION OF GALLBLADDER, PERCUTANEOUS ENDOSCOPIC APPROACH: ICD-10-PCS | Performed by: SURGERY

## 2017-12-30 PROCEDURE — 7100000000 HC PACU RECOVERY - FIRST 15 MIN: Performed by: SURGERY

## 2017-12-30 PROCEDURE — 36415 COLL VENOUS BLD VENIPUNCTURE: CPT

## 2017-12-30 RX ORDER — DEXAMETHASONE SODIUM PHOSPHATE 10 MG/ML
INJECTION INTRAMUSCULAR; INTRAVENOUS PRN
Status: DISCONTINUED | OUTPATIENT
Start: 2017-12-30 | End: 2017-12-30 | Stop reason: SDUPTHER

## 2017-12-30 RX ORDER — FENTANYL CITRATE 50 UG/ML
INJECTION, SOLUTION INTRAMUSCULAR; INTRAVENOUS
Status: DISPENSED
Start: 2017-12-30 | End: 2017-12-31

## 2017-12-30 RX ORDER — ALBUTEROL SULFATE 90 UG/1
AEROSOL, METERED RESPIRATORY (INHALATION) PRN
Status: DISCONTINUED | OUTPATIENT
Start: 2017-12-30 | End: 2017-12-30 | Stop reason: SDUPTHER

## 2017-12-30 RX ORDER — LIDOCAINE HYDROCHLORIDE 10 MG/ML
INJECTION, SOLUTION EPIDURAL; INFILTRATION; INTRACAUDAL; PERINEURAL PRN
Status: DISCONTINUED | OUTPATIENT
Start: 2017-12-30 | End: 2017-12-30 | Stop reason: SDUPTHER

## 2017-12-30 RX ORDER — PROPOFOL 10 MG/ML
INJECTION, EMULSION INTRAVENOUS PRN
Status: DISCONTINUED | OUTPATIENT
Start: 2017-12-30 | End: 2017-12-30 | Stop reason: SDUPTHER

## 2017-12-30 RX ORDER — ROCURONIUM BROMIDE 10 MG/ML
INJECTION, SOLUTION INTRAVENOUS PRN
Status: DISCONTINUED | OUTPATIENT
Start: 2017-12-30 | End: 2017-12-30 | Stop reason: SDUPTHER

## 2017-12-30 RX ORDER — BUPIVACAINE HYDROCHLORIDE 5 MG/ML
INJECTION, SOLUTION EPIDURAL; INTRACAUDAL PRN
Status: DISCONTINUED | OUTPATIENT
Start: 2017-12-30 | End: 2017-12-30 | Stop reason: HOSPADM

## 2017-12-30 RX ORDER — ONDANSETRON 2 MG/ML
4 INJECTION INTRAMUSCULAR; INTRAVENOUS ONCE
Status: COMPLETED | OUTPATIENT
Start: 2017-12-30 | End: 2017-12-30

## 2017-12-30 RX ORDER — SODIUM CHLORIDE 9 MG/ML
INJECTION, SOLUTION INTRAVENOUS CONTINUOUS PRN
Status: DISCONTINUED | OUTPATIENT
Start: 2017-12-30 | End: 2017-12-30 | Stop reason: SDUPTHER

## 2017-12-30 RX ORDER — FENTANYL CITRATE 50 UG/ML
INJECTION, SOLUTION INTRAMUSCULAR; INTRAVENOUS PRN
Status: DISCONTINUED | OUTPATIENT
Start: 2017-12-30 | End: 2017-12-30 | Stop reason: SDUPTHER

## 2017-12-30 RX ORDER — FENTANYL CITRATE 50 UG/ML
25 INJECTION, SOLUTION INTRAMUSCULAR; INTRAVENOUS EVERY 5 MIN PRN
Status: COMPLETED | OUTPATIENT
Start: 2017-12-30 | End: 2017-12-30

## 2017-12-30 RX ORDER — ONDANSETRON 2 MG/ML
INJECTION INTRAMUSCULAR; INTRAVENOUS PRN
Status: DISCONTINUED | OUTPATIENT
Start: 2017-12-30 | End: 2017-12-30 | Stop reason: SDUPTHER

## 2017-12-30 RX ORDER — MAGNESIUM HYDROXIDE 1200 MG/15ML
LIQUID ORAL CONTINUOUS PRN
Status: DISCONTINUED | OUTPATIENT
Start: 2017-12-30 | End: 2017-12-30 | Stop reason: HOSPADM

## 2017-12-30 RX ORDER — GLYCOPYRROLATE 0.2 MG/ML
INJECTION INTRAMUSCULAR; INTRAVENOUS PRN
Status: DISCONTINUED | OUTPATIENT
Start: 2017-12-30 | End: 2017-12-30 | Stop reason: SDUPTHER

## 2017-12-30 RX ORDER — EPHEDRINE SULFATE 50 MG/ML
INJECTION, SOLUTION INTRAVENOUS PRN
Status: DISCONTINUED | OUTPATIENT
Start: 2017-12-30 | End: 2017-12-30 | Stop reason: SDUPTHER

## 2017-12-30 RX ORDER — CEFAZOLIN SODIUM 1 G/3ML
INJECTION, POWDER, FOR SOLUTION INTRAMUSCULAR; INTRAVENOUS PRN
Status: DISCONTINUED | OUTPATIENT
Start: 2017-12-30 | End: 2017-12-30 | Stop reason: SDUPTHER

## 2017-12-30 RX ADMIN — TAMSULOSIN HYDROCHLORIDE 0.4 MG: 0.4 CAPSULE ORAL at 18:43

## 2017-12-30 RX ADMIN — BETHANECHOL CHLORIDE 25 MG: 25 TABLET ORAL at 21:32

## 2017-12-30 RX ADMIN — FENTANYL CITRATE 25 MCG: 50 INJECTION, SOLUTION INTRAMUSCULAR; INTRAVENOUS at 14:15

## 2017-12-30 RX ADMIN — ROCURONIUM BROMIDE 20 MG: 10 INJECTION INTRAVENOUS at 12:24

## 2017-12-30 RX ADMIN — Medication 10 ML: at 21:34

## 2017-12-30 RX ADMIN — ROCURONIUM BROMIDE 35 MG: 10 INJECTION INTRAVENOUS at 12:08

## 2017-12-30 RX ADMIN — FENTANYL CITRATE 25 MCG: 50 INJECTION, SOLUTION INTRAMUSCULAR; INTRAVENOUS at 14:10

## 2017-12-30 RX ADMIN — GLYCOPYRROLATE 1 MG: 0.2 INJECTION INTRAMUSCULAR; INTRAVENOUS at 13:34

## 2017-12-30 RX ADMIN — FENTANYL CITRATE 25 MCG: 50 INJECTION INTRAMUSCULAR; INTRAVENOUS at 12:50

## 2017-12-30 RX ADMIN — EPHEDRINE SULFATE 5 MG: 50 INJECTION, SOLUTION INTRAMUSCULAR; INTRAVENOUS; SUBCUTANEOUS at 12:28

## 2017-12-30 RX ADMIN — FENTANYL CITRATE 25 MCG: 50 INJECTION, SOLUTION INTRAMUSCULAR; INTRAVENOUS at 14:25

## 2017-12-30 RX ADMIN — NEOSTIGMINE METHYLSULFATE 5 MG: 1 INJECTION, SOLUTION INTRAMUSCULAR; INTRAVENOUS; SUBCUTANEOUS at 13:34

## 2017-12-30 RX ADMIN — FENTANYL CITRATE 25 MCG: 50 INJECTION INTRAMUSCULAR; INTRAVENOUS at 12:34

## 2017-12-30 RX ADMIN — GLYCOPYRROLATE 0.2 MG: 0.2 INJECTION INTRAMUSCULAR; INTRAVENOUS at 12:34

## 2017-12-30 RX ADMIN — SODIUM CHLORIDE: 9 INJECTION, SOLUTION INTRAVENOUS at 12:18

## 2017-12-30 RX ADMIN — EPHEDRINE SULFATE 5 MG: 50 INJECTION, SOLUTION INTRAMUSCULAR; INTRAVENOUS; SUBCUTANEOUS at 12:34

## 2017-12-30 RX ADMIN — FAMOTIDINE 20 MG: 10 INJECTION, SOLUTION INTRAVENOUS at 21:32

## 2017-12-30 RX ADMIN — ONDANSETRON 4 MG: 2 INJECTION INTRAMUSCULAR; INTRAVENOUS at 14:31

## 2017-12-30 RX ADMIN — GLYCOPYRROLATE 0.2 MG: 0.2 INJECTION INTRAMUSCULAR; INTRAVENOUS at 12:21

## 2017-12-30 RX ADMIN — MORPHINE SULFATE 2 MG: 2 INJECTION, SOLUTION INTRAMUSCULAR; INTRAVENOUS at 20:18

## 2017-12-30 RX ADMIN — CEFAZOLIN 2000 MG: 1 INJECTION, POWDER, FOR SOLUTION INTRAMUSCULAR; INTRAVENOUS at 12:23

## 2017-12-30 RX ADMIN — ROCURONIUM BROMIDE 10 MG: 10 INJECTION INTRAVENOUS at 13:08

## 2017-12-30 RX ADMIN — DOCUSATE SODIUM 100 MG: 100 CAPSULE, LIQUID FILLED ORAL at 21:32

## 2017-12-30 RX ADMIN — PROPOFOL 150 MG: 10 INJECTION, EMULSION INTRAVENOUS at 12:08

## 2017-12-30 RX ADMIN — LIDOCAINE HYDROCHLORIDE 50 MG: 10 INJECTION, SOLUTION EPIDURAL; INFILTRATION; INTRACAUDAL; PERINEURAL at 12:08

## 2017-12-30 RX ADMIN — FENTANYL CITRATE 25 MCG: 50 INJECTION INTRAMUSCULAR; INTRAVENOUS at 12:19

## 2017-12-30 RX ADMIN — FENTANYL CITRATE 25 MCG: 50 INJECTION, SOLUTION INTRAMUSCULAR; INTRAVENOUS at 14:20

## 2017-12-30 RX ADMIN — Medication 12 PUFF: at 13:47

## 2017-12-30 RX ADMIN — ONDANSETRON 4 MG: 2 INJECTION INTRAMUSCULAR; INTRAVENOUS at 13:30

## 2017-12-30 RX ADMIN — FENTANYL CITRATE 25 MCG: 50 INJECTION INTRAMUSCULAR; INTRAVENOUS at 12:37

## 2017-12-30 RX ADMIN — DEXAMETHASONE SODIUM PHOSPHATE 10 MG: 10 INJECTION INTRAMUSCULAR; INTRAVENOUS at 12:22

## 2017-12-30 RX ADMIN — METOPROLOL TARTRATE 12.5 MG: 25 TABLET ORAL at 21:32

## 2017-12-30 RX ADMIN — SIMVASTATIN 20 MG: 20 TABLET, FILM COATED ORAL at 21:32

## 2017-12-30 RX ADMIN — BETHANECHOL CHLORIDE 25 MG: 25 TABLET ORAL at 18:44

## 2017-12-30 ASSESSMENT — PULMONARY FUNCTION TESTS
PIF_VALUE: 19
PIF_VALUE: 14
PIF_VALUE: 19
PIF_VALUE: 22
PIF_VALUE: 2
PIF_VALUE: 23
PIF_VALUE: 24
PIF_VALUE: 21
PIF_VALUE: 22
PIF_VALUE: 15
PIF_VALUE: 2
PIF_VALUE: 22
PIF_VALUE: 4
PIF_VALUE: 22
PIF_VALUE: 23
PIF_VALUE: 21
PIF_VALUE: 1
PIF_VALUE: 2
PIF_VALUE: 16
PIF_VALUE: 23
PIF_VALUE: 23
PIF_VALUE: 19
PIF_VALUE: 15
PIF_VALUE: 3
PIF_VALUE: 2
PIF_VALUE: 22
PIF_VALUE: 21
PIF_VALUE: 21
PIF_VALUE: 22
PIF_VALUE: 23
PIF_VALUE: 16
PIF_VALUE: 22
PIF_VALUE: 19
PIF_VALUE: 22
PIF_VALUE: 22
PIF_VALUE: 23
PIF_VALUE: 15
PIF_VALUE: 16
PIF_VALUE: 15
PIF_VALUE: 1
PIF_VALUE: 15
PIF_VALUE: 36
PIF_VALUE: 15
PIF_VALUE: 22
PIF_VALUE: 16
PIF_VALUE: 22
PIF_VALUE: 21
PIF_VALUE: 23
PIF_VALUE: 21
PIF_VALUE: 18
PIF_VALUE: 22
PIF_VALUE: 18
PIF_VALUE: 22
PIF_VALUE: 22
PIF_VALUE: 15
PIF_VALUE: 16
PIF_VALUE: 1
PIF_VALUE: 22
PIF_VALUE: 17
PIF_VALUE: 16
PIF_VALUE: 2
PIF_VALUE: 23
PIF_VALUE: 19
PIF_VALUE: 23
PIF_VALUE: 22
PIF_VALUE: 0
PIF_VALUE: 16
PIF_VALUE: 18
PIF_VALUE: 21
PIF_VALUE: 22
PIF_VALUE: 15
PIF_VALUE: 3
PIF_VALUE: 47
PIF_VALUE: 15
PIF_VALUE: 15
PIF_VALUE: 17
PIF_VALUE: 22
PIF_VALUE: 1
PIF_VALUE: 2
PIF_VALUE: 1
PIF_VALUE: 15
PIF_VALUE: 22
PIF_VALUE: 22
PIF_VALUE: 17
PIF_VALUE: 19
PIF_VALUE: 17
PIF_VALUE: 24
PIF_VALUE: 16
PIF_VALUE: 23
PIF_VALUE: 15
PIF_VALUE: 1
PIF_VALUE: 24
PIF_VALUE: 15
PIF_VALUE: 22
PIF_VALUE: 20
PIF_VALUE: 4
PIF_VALUE: 1
PIF_VALUE: 16
PIF_VALUE: 16
PIF_VALUE: 23
PIF_VALUE: 23
PIF_VALUE: 17
PIF_VALUE: 32
PIF_VALUE: 15
PIF_VALUE: 4
PIF_VALUE: 15
PIF_VALUE: 1
PIF_VALUE: 19
PIF_VALUE: 23

## 2017-12-30 ASSESSMENT — PAIN SCALES - GENERAL
PAINLEVEL_OUTOF10: 1
PAINLEVEL_OUTOF10: 5
PAINLEVEL_OUTOF10: 4

## 2017-12-30 ASSESSMENT — LIFESTYLE VARIABLES
SMOKING_STATUS: 0
SMOKING_STATUS: 0

## 2017-12-30 ASSESSMENT — COPD QUESTIONNAIRES
CAT_SEVERITY: MODERATE
CAT_SEVERITY: MODERATE

## 2017-12-30 ASSESSMENT — ENCOUNTER SYMPTOMS
SHORTNESS OF BREATH: 1
SHORTNESS OF BREATH: 1

## 2017-12-30 NOTE — PLAN OF CARE
Problem: Pain:  Goal: Pain level will decrease  Pain level will decrease   Outcome: Ongoing    Goal: Control of acute pain  Control of acute pain   Outcome: Ongoing    Goal: Control of chronic pain  Control of chronic pain   Outcome: Ongoing      Problem: Falls - Risk of:  Goal: Will remain free from falls  Will remain free from falls   Outcome: Ongoing    Goal: Absence of physical injury  Absence of physical injury   Outcome: Ongoing

## 2017-12-30 NOTE — ANESTHESIA PRE PROCEDURE
Department of Anesthesiology  Preprocedure Note       Name:  Facundo Crawford   Age:  80 y.o.  :  1933                                          MRN:  [id number]         Date:  2017      Surgeon: Dileep Galo):  Tomy Lucio MD    Procedure: Procedure(s):  ERCP ENDOSCOPIC RETROGRADE CHOLANGIOPANCREATOGRAPHY  DUODENAL SPHINCTEROPLASTY  ERCP DILATION BALLOON    Department of Anesthesiology  Pre-Anesthesia Evaluation/Consultation         Name:  Facundo Crawford                                         Age:  80 y.o.   MRN:  [id number]             Medications  Current Facility-Administered Medications   Medication Dose Route Frequency Provider Last Rate Last Dose    [MAR Hold] 0.9 % sodium chloride infusion   Intravenous Continuous Virender Abdiel Meng  mL/hr at 17 1900      [MAR Hold] levofloxacin (LEVAQUIN) 500 MG/100ML infusion 500 mg  500 mg Intravenous Q24H Martha Garcia MD   Stopped at 17 1250    [MAR Hold] heparin (porcine) injection 4,000 Units  4,000 Units Intravenous PRN aMrtha Garcia MD        [facility] Hold] heparin (porcine) injection 2,000 Units  2,000 Units Intravenous PRN Martha Garcia MD   2,000 Units at 17 1008    [MAR Hold] heparin 25,000 units in dextrose 5% 250 mL infusion  12 Units/kg/hr Intravenous Continuous Hannah Sarabia DO   Stopped at 17 0000    [MAR Hold] amLODIPine (NORVASC) tablet 5 mg  5 mg Oral Daily Virender Abdiel Meng MD   5 mg at 17 0958    [MAR Hold] bethanechol (URECHOLINE) tablet 25 mg  25 mg Oral Q6H Martha Garcia MD   25 mg at 17 2250    [MAR Hold] metoprolol tartrate (LOPRESSOR) tablet 12.5 mg  12.5 mg Oral Nightly Virender Abdiel Meng MD   12.5 mg at 17    [MAR Hold] simvastatin (ZOCOR) tablet 20 mg  20 mg Oral Nightly Virender Abdiel Meng MD   20 mg at 17 224    [MAR Hold] tamsulosin (FLOMAX) capsule 0.4 mg  0.4 mg Oral Daily Martha Garcia MD   0.4 mg at 17 0958    [MAR Hold] sodium chloride flush 0.9 INR 1.0 10/24/2017    APTT 24.3 12/29/2017       HCG (If Applicable) No results found for: PREGTESTUR, PREGSERUM, HCG, HCGQUANT     ABGs No results found for: PHART, PO2ART, QDZ7QSB, FIB0DOF, BEART, L3AIUDCQ     Type & Screen (If Applicable)  No results found for: LABABO, [address]    Radiology (If Applicable)    Cardiac Testing (If Applicable) grafts open cath 10/17, nl EFLVH    EKG (If Applicable) first degree,PVC's,anf,ant infarct,lateral ischemia          Medications prior to admission:   Prior to Admission medications    Medication Sig Start Date End Date Taking? Authorizing Provider   bethanechol (URECHOLINE) 25 MG tablet Take 1 tablet by mouth every 6 hours 11/28/17   Davin Gasca MD   apixaban [place]) 2.5 MG TABS tablet Take 1 tablet by mouth 2 times daily 11/6/17   Kalee Thapa MD   amLODIPine [facility]) 5 MG tablet Take 1 tablet by mouth daily 11/6/17   Kalee Thapa MD   metoprolol succinate (TOPROL XL) 25 MG extended release tablet Take 0.5 tablets by mouth nightly 11/6/17   Kalee Thapa MD   furosemide (LASIX) 40 MG tablet TAKE 1 TABLET DAILY 11/3/17   Carlee Anand NP   lisinopril (PRINIVIL;ZESTRIL) 20 MG tablet Take 1 tablet by mouth daily MUST HAVE APPOINTMENT WITH DR. Aydin Ortiz FOR ANY ADDITIONAL REFILLS!! 11/3/17   Carlee Anand NP   tamsulosin (FLOMAX) 0.4 MG capsule TAKE 1 CAPSULE DAILY 11/3/17   Carlee Anand NP   simvastatin (ZOCOR) 20 MG tablet TAKE 1 TABLET NIGHTLY 8/28/17   Davin Gasca MD   metoprolol tartrate (LOPRESSOR) 25 MG tablet Take 0.5 tablets by mouth nightly 5/4/17   Kathe Barrow MD   amiodarone (CORDARONE) 200 MG tablet Take 1 tablet by mouth daily. 1/7/15 5/4/16  Malia Wheat MD   aspirin 81 MG tablet Take 81 mg by mouth daily.     Historical Provider, MD       Current medications:    Current Facility-Administered Medications   Medication Dose Route Frequency Provider Last Rate Last Dose    [MAR Hold] 0.9 % sodium chloride infusion   Intravenous Continuous Tia Frances  mL/hr at 12/29/17 1900      [MAR Hold] levofloxacin (LEVAQUIN) 500 MG/100ML infusion 500 mg  500 mg Intravenous Q24H Chiqui Lomas MD   Stopped at 12/29/17 1250    [MAR Hold] heparin (porcine) injection 4,000 Units  4,000 Units Intravenous PRMAXIMILIAN Lomas MD        [facility] Hold] heparin (porcine) injection 2,000 Units  2,000 Units Intravenous PRMAXIMILIAN Lomas MD   2,000 Units at 12/29/17 1008    [MAR Hold] heparin 25,000 units in dextrose 5% 250 mL infusion  12 Units/kg/hr Intravenous Continuous Eileen Booker DO   Stopped at 12/30/17 0000    [MAR Hold] amLODIPine (NORVASC) tablet 5 mg  5 mg Oral Daily Tia Frances MD   5 mg at 12/29/17 0958    [MAR Hold] bethanechol (URECHOLINE) tablet 25 mg  25 mg Oral Q6H Chiqui Lomas MD   25 mg at 12/29/17 2250    [MAR Hold] metoprolol tartrate (LOPRESSOR) tablet 12.5 mg  12.5 mg Oral Nightly [facility]   12.5 mg at 12/29/17 2249    [MAR Hold] simvastatin (ZOCOR) tablet 20 mg  20 mg Oral Nightly Anatolyer Renata Frances MD   20 mg at 12/29/17 2249    [MAR Hold] tamsulosin (FLOMAX) capsule 0.4 mg  0.4 mg Oral Daily Tia Frances MD   0.4 mg at 12/29/17 0958    [MAR Hold] sodium chloride flush 0.9 % injection 10 mL  10 mL Intravenous 2 times per day Chiqui Lomas MD   10 mL at 12/29/17 0957    [MAR Hold] sodium chloride flush 0.9 % injection 10 mL  10 mL Intravenous PRMAXIMILIAN Lomas MD        [facility] Hold] potassium chloride (KLOR-CON M) extended release tablet 40 mEq  40 mEq Oral PRN Chiqui Lomas MD        Or   [facility] Hold] potassium chloride 20 MEQ/15ML (10%) oral solution 40 mEq  40 mEq Oral PRN Chiqui Lomas MD        Or   [facility] Hold] potassium chloride 10 mEq/100 mL IVPB (Peripheral Line)  10 mEq Intravenous PRN Chiqui Lomas MD        [facility] Hold] potassium chloride 10 mEq/100 mL IVPB (Peripheral Line)  10 mEq Intravenous PRN Chiqui Lomas MD       [facility] Hyperglycemia R73.9    Renal insufficiency N28.9    Sore throat J02.9    Esophageal candidiasis ([facility]) B37.81    ARF (acute renal failure) ([facility]) N17.9    Cataract H26.9    Hyperopia of both eyes with astigmatism and presbyopia H52.03, H52.203, H52.4    Atypical chest pain R07.89    Non-intractable vomiting with nausea R11.2    Chronic diastolic CHF (congestive heart failure) ([facility]) I50.32    Calculus of gallbladder with acute cholecystitis and obstruction K80.01       Past Medical History:        Diagnosis Date    Aneurysm of infrarenal abdominal aorta ([facility])     Atrial fibrillation ([address] 11/25/2014    Cardioversion successful    BPH (benign prostatic hyperplasia)     CAD (coronary artery disease)     Cerebrovascular disease     DVT (deep venous thrombosis) ([facility]) 1960's    left leg    Elevated PSA     Hyperlipidemia     Hyperopia with astigmatism and presbyopia     Hypertension        Past Surgical History:        Procedure Laterality Date    BACK SURGERY      CARDIAC SURGERY  11-17-14    CORONARY ARTERY BYPASS GRAFT  11/17/14    OP CABG X 4- Dr Brenner Res ERCP  12/29/2017    DUODENAL SPHINCTEROPLASTY, ERCP with dilation balloon    KNEE SURGERY Left     reconstruction post AA       Social History:    Social History   Substance Use Topics    Smoking status: Former Smoker     Packs/day: 0.50     Types: Cigarettes     Quit date: 12/10/2004    Smokeless tobacco: Never Used      Comment: Wilfredo Rockwell RRT 10/22/17    Alcohol use Yes      Comment: occassional                                Counseling given: Not Answered      Vital Signs (Current):   Vitals:    12/30/17 0022 12/30/17 0425 12/30/17 0742 12/30/17 0923   BP: 122/70 (!) 112/52 (!) 120/57    Pulse: 86  64    Resp: 20  18    Temp: 98.1 °F (36.7 °C)  98.4 °F (36.9 °C)    TempSrc: Oral  Oral    SpO2: 96%  99%    Weight:    232 lb (105.2 kg)   Height:    5' 10\" (1.778 m)                                              BP Readings from Last 3 Encounters:   12/30/17 (!) 120/57   12/29/17 (!) 154/97   12/27/17 (!) 208/88       NPO Status: Time of last liquid consumption: 0900                        Time of last solid consumption: 0900                        Date of last liquid consumption: 12/26/17                        Date of last solid food consumption: 12/26/17    BMI:   Wt Readings from Last 3 Encounters:   12/30/17 232 lb (105.2 kg)   12/27/17 220 lb (99.8 kg)   11/22/17 224 lb (101.6 kg)     Body mass index is 33.29 kg/m². CBC:   Lab Results   Component Value Date    WBC 11.8 12/29/2017    RBC 4.18 12/29/2017    HGB 12.6 12/29/2017    HCT 39.1 12/29/2017    MCV 93.5 12/29/2017    RDW 14.3 12/29/2017     12/30/2017       CMP:   Lab Results   Component Value Date     12/30/2017    K 4.1 12/30/2017     12/30/2017    CO2 26 12/30/2017    BUN 38 12/30/2017    CREATININE 1.66 12/30/2017    GFRAA 48 12/30/2017    LABGLOM 40 12/30/2017    GLUCOSE 99 12/30/2017    PROT 6.8 12/28/2017    CALCIUM 8.3 12/30/2017    BILITOT 1.27 12/28/2017    ALKPHOS 75 12/28/2017    AST 17 12/28/2017    ALT 11 12/28/2017       POC Tests: No results for input(s): POCGLU, POCNA, POCK, POCCL, POCBUN, POCHEMO, POCHCT in the last 72 hours.     Coags:   Lab Results   Component Value Date    PROTIME 11.2 10/24/2017    INR 1.0 10/24/2017    APTT 24.3 12/29/2017       HCG (If Applicable): No results found for: PREGTESTUR, PREGSERUM, HCG, HCGQUANT     ABGs: No results found for: PHART, PO2ART, HTO7DPV, BJO1TXD, BEART, I7KLIDTK     Type & Screen (If Applicable):  No results found for: LABABO, LABRH    Anesthesia Evaluation   no history of anesthetic complications:   Airway:         Dental:          Pulmonary:   (+) COPD: moderate,  shortness of breath:      (-) not a current smoker                          ROS comment: DVT   Cardiovascular:    (+) hypertension:, CAD:, CABG/stent:, dysrhythmias: atrial fibrillation, CHF:,                ROS comment:

## 2017-12-30 NOTE — ANESTHESIA PRE PROCEDURE
[MAR Hold] heparin (porcine) injection 4,000 Units  4,000 Units Intravenous JOESPH Gaspar MD        [facility] Hold] heparin (porcine) injection 2,000 Units  2,000 Units Intravenous PRMAXIMILIAN Gaspar MD   2,000 Units at 12/29/17 1008    [MAR Hold] heparin 25,000 units in dextrose 5% 250 mL infusion  12 Units/kg/hr Intravenous Continuous Oneyda Deter, DO   Stopped at 12/30/17 0000    [MAR Hold] amLODIPine (NORVASC) tablet 5 mg  5 mg Oral Daily Tia Cardenas MD   5 mg at 12/29/17 0958    [MAR Hold] bethanechol (URECHOLINE) tablet 25 mg  25 mg Oral Q6H Janeth Gaspar MD   25 mg at 12/29/17 2250    [MAR Hold] metoprolol tartrate (LOPRESSOR) tablet 12.5 mg  12.5 mg Oral Nightly Janeth Gaspar MD   12.5 mg at 12/29/17 2249    [MAR Hold] simvastatin (ZOCOR) tablet 20 mg  20 mg Oral Nightly Janeth Gaspar MD   20 mg at 12/29/17 2249    [MAR Hold] tamsulosin (FLOMAX) capsule 0.4 mg  0.4 mg Oral Daily Tia Cardenas MD   0.4 mg at 12/29/17 0958    [MAR Hold] sodium chloride flush 0.9 % injection 10 mL  10 mL Intravenous 2 times per day Janeth Gaspar MD   10 mL at 12/29/17 0957    [MAR Hold] sodium chloride flush 0.9 % injection 10 mL  10 mL Intravenous PRMAXIMILIAN Gaspar MD        [facility] Hold] potassium chloride (KLOR-CON M) extended release tablet 40 mEq  40 mEq Oral PRMAXIMILIAN Gaspar MD        Or   [facility] Hold] potassium chloride 20 MEQ/15ML (10%) oral solution 40 mEq  40 mEq Oral PRMD [facility] Hold] potassium chloride 10 mEq/100 mL IVPB (Peripheral Line)  10 mEq Intravenous JOESPH Gaspar MD        [facility] Hold] potassium chloride 10 mEq/100 mL IVPB (Peripheral Line)  10 mEq Intravenous PRN Janeth Gaspar MD        [facility] Hold] magnesium sulfate 1 g in dextrose 5% 100 mL IVPB  1 g Intravenous PRN Janeth Gaspar MD        [facility] Hold] acetaminophen (TYLENOL) tablet 650 mg  650 mg Oral Q4H PRMAXIMILIAN Gaspar MD        [facility] Hold] HYDROcodone-acetaminophen cholecystitis and obstruction     Past Medical History:   Diagnosis Date    Aneurysm of infrarenal abdominal aorta (HCC)     [facility] [address] 2014    Cardioversion successful    BPH (benign prostatic hyperplasia)     CAD (coronary artery disease)     Cerebrovascular disease     DVT (deep venous thrombosis) ([facility] 1960's    left leg    Elevated PSA     Hyperlipidemia     Hyperopia with astigmatism and presbyopia     Hypertension      Past Surgical History:   Procedure Laterality Date    BACK SURGERY      CARDIAC SURGERY  14    CORONARY ARTERY BYPASS GRAFT  14    OP CABG X 4- Dr Noble Amen ERCP  2017    DUODENAL SPHINCTEROPLASTY, ERCP with dilation balloon    KNEE SURGERY Left     reconstruction post AA     Social History   Substance Use Topics    Smoking status: Former Smoker     Packs/day: 0.50     Types: Cigarettes     Quit date: 12/10/2004    Smokeless tobacco: Never Used      Comment: Hernesto Iglesias RRT 10/22/17    Alcohol use Yes      Comment: occassional         Vital Signs (Current)   There were no vitals filed for this visit. Vital Signs Statistics (for past 48 hrs)     Temp  Av.8 °C (98.2 °F)  Min: 36.4 °C (97.5 °F)   Min taken time: 17 1700  Max: 36.9 °C (98.4 °F)   Max taken time: 17 0742  Pulse  Av.7  Min: 59   Min taken time: 17 0742  Max: 110   Max taken time: 17 2000  Resp  Avg: 10.9  Min: 0   Min taken time: 17 1600  Max: 24   Max taken time: 17 1605  BP  Min: 103/57   Min taken time: 17 1533  Max: 173/98   Max taken time: 17 1243  SpO2  Av.8 %  Min: 92 %   Min taken time: 17 1630  Max: 100 %   Max taken time: 17 1523  BP Readings from Last 3 Encounters:   17 (!) 120/57   17 121/75   17 (!) 154/97       BMI  There is no height or weight on file to calculate BMI.     CBC   Lab Results   Component Value Date    WBC 11.8 2017    RBC 4.18 2017    HGB 12.6 12/29/2017    HCT 39.1 12/29/2017    MCV 93.5 12/29/2017    RDW 14.3 12/29/2017     12/30/2017       CMP    Lab Results   Component Value Date     12/30/2017    K 4.1 12/30/2017     12/30/2017    CO2 26 12/30/2017    BUN 38 12/30/2017    CREATININE 1.66 12/30/2017    GFRAA 48 12/30/2017    LABGLOM 40 12/30/2017    GLUCOSE 99 12/30/2017    PROT 6.8 12/28/2017    CALCIUM 8.3 12/30/2017    BILITOT 1.27 12/28/2017    ALKPHOS 75 12/28/2017    AST 17 12/28/2017    ALT 11 12/28/2017       BMP    Lab Results   Component Value Date     12/30/2017    K 4.1 12/30/2017     12/30/2017    CO2 26 12/30/2017    BUN 38 12/30/2017    CREATININE 1.66 12/30/2017    CALCIUM 8.3 12/30/2017    GFRAA 48 12/30/2017    LABGLOM 40 12/30/2017    GLUCOSE 99 12/30/2017       POC Testing  No results for input(s): POCGLU, POCNA, POCK, POCCL, POCBUN, POCHEMO, POCHCT in the last 72 hours. Coags    Lab Results   Component Value Date    PROTIME 11.2 10/24/2017    INR 1.0 10/24/2017    APTT 24.3 12/29/2017       HCG (If Applicable) No results found for: PREGTESTUR, PREGSERUM, HCG, HCGQUANT     ABGs No results found for: PHART, PO2ART, VIO1XII, ABW6RTA, BEART, Q8JPVJUK     Type & Screen (If Applicable)  No results found for: [facility]    Radiology (If Applicable)    Cardiac Testing (If Applicable) grafts open cath 10/17, nl EFLVH    EKG (If Applicable) first degree,PVC's,anf,ant infarct,lateral ischemia          Medications prior to admission:   Prior to Admission medications    Medication Sig Start Date End Date Taking?  Authorizing Provider   bethanechol (URECHOLINE) 25 MG tablet Take 1 tablet by mouth every 6 hours 11/28/17   Laly Bello MD   apixaban [place]) 2.5 MG TABS tablet Take 1 tablet by mouth 2 times daily 11/6/17   Bobbi Cavazos MD   amLODIPine [facility]) 5 MG tablet Take 1 tablet by mouth daily 11/6/17   Bobbi Cavazos MD   metoprolol succinate (TOPROL XL) 25 MG extended release tablet Take 0.5 tablets by mouth nightly 11/6/17   Roberto Carrasco MD   furosemide (LASIX) 40 MG tablet TAKE 1 TABLET DAILY 11/3/17   Caitlin Proctor NP   lisinopril (PRINIVIL;ZESTRIL) 20 MG tablet Take 1 tablet by mouth daily MUST HAVE APPOINTMENT WITH DR. Francis Albarado FOR ANY ADDITIONAL REFILLS!! 11/3/17   Caitlin Proctor NP   tamsulosin (FLOMAX) 0.4 MG capsule TAKE 1 CAPSULE DAILY 11/3/17   Caitlin Proctor NP   simvastatin (ZOCOR) 20 MG tablet TAKE 1 TABLET NIGHTLY 8/28/17   Nellie Tabares MD   metoprolol tartrate (LOPRESSOR) 25 MG tablet Take 0.5 tablets by mouth nightly 5/4/17   Harish Stephens MD   amiodarone (CORDARONE) 200 MG tablet Take 1 tablet by mouth daily. 1/7/15 5/4/16  Hosea Culp MD   aspirin 81 MG tablet Take 81 mg by mouth daily. Historical Provider, MD       Current medications:    No current facility-administered medications for this visit. No current outpatient prescriptions on file.      Facility-Administered Medications Ordered in Other Visits   Medication Dose Route Frequency Provider Last Rate Last Dose    dexamethasone (DECADRON) injection    PRN Stephanie Christie CRNA   10 mg at 12/30/17 1222    Glycopyrrolate injection    PRN Stephanie Christie CRNA   0.2 mg at 12/30/17 1234    rocuronium (ZEMURON) injection    PRN Stephanie Christie CRNA   20 mg at 12/30/17 1224    ceFAZolin (ANCEF) injection   Intravenous PRN Stephanie Christie CRNA   2,000 mg at 12/30/17 1223    ePHEDrine injection    PRN Stephanie Christie CRNA   5 mg at 12/30/17 1234    fentaNYL (SUBLIMAZE) injection    PRN Stephanie Christie CRNA   25 mcg at 12/30/17 1250    lidocaine PF 1 % injection    PRN Stephanie Christie CRNA   50 mg at 12/30/17 1208    propofol injection    PRN Stephanie Christie CRNA   150 mg at 12/30/17 1208    0.9 % sodium chloride infusion    Continuous PRN Stephanie Christie CRNA        sodium chloride 0.9 % irrigation    Continuous PRN Teresa Christensen Ang, DO   1,000 mL at 12/30/17  HLD (hyperlipidemia) E78.5    Hyperglycemia R73.9    Renal insufficiency N28.9    Sore throat J02.9    Esophageal candidiasis ([facility]) B37.81    ARF (acute renal failure) ([facility]) N17.9    Cataract H26.9    Hyperopia of both eyes with astigmatism and presbyopia H52.03, H52.203, H52.4    Atypical chest pain R07.89    Non-intractable vomiting with nausea R11.2    Chronic diastolic CHF (congestive heart failure) ([facility]) I50.32    Calculus of gallbladder with acute cholecystitis and obstruction K80.01       Past Medical History:        Diagnosis Date    Aneurysm of infrarenal abdominal aorta ([facility])     Atrial fibrillation ([address] 11/25/2014    Cardioversion successful    BPH (benign prostatic hyperplasia)     CAD (coronary artery disease)     Cerebrovascular disease     DVT (deep venous thrombosis) ([facility]) 1960's    left leg    Elevated PSA     Hyperlipidemia     Hyperopia with astigmatism and presbyopia     Hypertension        Past Surgical History:        Procedure Laterality Date    BACK SURGERY      CARDIAC SURGERY  11-17-14    CORONARY ARTERY BYPASS GRAFT  11/17/14    OP CABG X 4- Dr Elvin Hernandez ERCP  12/29/2017    DUODENAL SPHINCTEROPLASTY, ERCP with dilation balloon    KNEE SURGERY Left     reconstruction post AA       Social History:    Social History   Substance Use Topics    Smoking status: Former Smoker     Packs/day: 0.50     Types: Cigarettes     Quit date: 12/10/2004    Smokeless tobacco: Never Used      Comment: Severiano Shed RRT 10/22/17    Alcohol use Yes      Comment: occassional                                Counseling given: Not Answered      Vital Signs (Current): There were no vitals filed for this visit.                                            BP Readings from Last 3 Encounters:   12/30/17 (!) 120/57   12/30/17 121/75   12/29/17 (!) 154/97       NPO Status:                                                                                 BMI:   Wt Readings from Last 3 Encounters: 12/30/17 232 lb (105.2 kg)   12/27/17 220 lb (99.8 kg)   11/22/17 224 lb (101.6 kg)     There is no height or weight on file to calculate BMI.    CBC:   Lab Results   Component Value Date    WBC 11.8 12/29/2017    RBC 4.18 12/29/2017    HGB 12.6 12/29/2017    HCT 39.1 12/29/2017    MCV 93.5 12/29/2017    RDW 14.3 12/29/2017     12/30/2017       CMP:   Lab Results   Component Value Date     12/30/2017    K 4.1 12/30/2017     12/30/2017    CO2 26 12/30/2017    BUN 38 12/30/2017    CREATININE 1.66 12/30/2017    GFRAA 48 12/30/2017    LABGLOM 40 12/30/2017    GLUCOSE 99 12/30/2017    PROT 6.8 12/28/2017    CALCIUM 8.3 12/30/2017    BILITOT 1.27 12/28/2017    ALKPHOS 75 12/28/2017    AST 17 12/28/2017    ALT 11 12/28/2017       POC Tests: No results for input(s): POCGLU, POCNA, POCK, POCCL, POCBUN, POCHEMO, POCHCT in the last 72 hours. Coags:   Lab Results   Component Value Date    PROTIME 11.2 10/24/2017    INR 1.0 10/24/2017    APTT 24.3 12/29/2017       HCG (If Applicable): No results found for: PREGTESTUR, PREGSERUM, HCG, HCGQUANT     ABGs: No results found for: PHART, PO2ART, DMP5JWC, JIK8AHL, BEART, E0TVLSDW     Type & Screen (If Applicable):  No results found for: LABABO, [address]    Anesthesia Evaluation  Patient summary reviewed and Nursing notes reviewed no history of anesthetic complications:   Airway:         Dental:          Pulmonary:   (+) COPD: moderate,  shortness of breath:      (-) not a current smoker                          ROS comment: DVT   Cardiovascular:    (+) hypertension:, CAD:, CABG/stent:, dysrhythmias: atrial fibrillation, CHF:,                ROS comment: PAD,AAA  -cp,syn,palp     Neuro/Psych:   (+) CVA:,    (-) seizures           GI/Hepatic/Renal:        (-) GERD      ROS comment: BPH.    Endo/Other:        (-) no Type II DM               Abdominal:           Vascular:                                          Anesthesia Plan      general     ASA 4     (Asa 4 cad)  Induction: intravenous. Plan discussed with CRNA and attending.                   Fredy Borrego CRNA   12/30/2017

## 2017-12-30 NOTE — PROGRESS NOTES
James Soliz 19    Progress Note    12/30/2017    5:29 PM    Name:   Virginie Hou  MRN:     [id number]     [place]:      [de-identified]   Room:   [address] [place] [address] Day:  3  Admit Date:  12/27/2017  4:18 PM    PCP:   Radha Giron MD  Code Status:  Full Code    Subjective:     C/C: Chest pain  Nausea vomiting    Interval History Status:  Denies any chest pain today  Denies nausea vomiting   pain in the right upper quadrant Has also improved  Had MRCP see report below  Creatinine 1.66   currently nothing by mouth for cholecystectomy today    Brief History:   Tommy Billing a 80 y. o. male who presents Complaining of midsternal chest pain that started 24 hours ago.  Olinda Ness has been having nausea and vomiting.  States  About 4 months ago when he had A. fib. And  At that time he had to be admitted, had a cath and was put on Eliquis.  He relates that the cath was okay. Before transfer to this hospital he was given a dose of Phenergan with which he was sleeping he woke up just now and is not vomiting      Review of Systems:     Constitutional:  negative for chills, fevers, sweats  Respiratory:  negative for cough, dyspnea on exertion, hemoptysis, shortness of breath, wheezing  Cardiovascular:  negative for chest pain, chest pressure/discomfort, lower extremity edema, palpitations  Gastrointestinal:  Denies right upper quadrant abdominal pain, no, nausea, vomiting  Neurological:  negative for dizziness, headache    Medications:      Allergies:  No Known Allergies    Current Meds:   Scheduled Meds:    levofloxacin  500 mg Intravenous Q24H    amLODIPine  5 mg Oral Daily    bethanechol  25 mg Oral Q6H    metoprolol tartrate  12.5 mg Oral Nightly    simvastatin  20 mg Oral Nightly    tamsulosin  0.4 mg Oral Daily    sodium chloride flush  10 mL Intravenous 2 times per day    docusate sodium  100 mg Oral BID    famotidine (PEPCID) injection  20 mg Intravenous BID     Continuous Infusions:    sodium chloride 100 mL/hr at 17 1900    heparin (porcine) Stopped (17 0000)     PRN Meds: heparin (porcine), heparin (porcine), sodium chloride flush, potassium chloride **OR** potassium chloride **OR** potassium chloride, potassium chloride, magnesium sulfate, acetaminophen, HYDROcodone 5 mg - acetaminophen, morphine **OR** morphine, magnesium hydroxide, bisacodyl, ondansetron, nitroGLYCERIN    Data:     Past Medical History:   has a past medical history of Aneurysm of infrarenal abdominal aorta ([facility] [address] [facility] fibrillation [facility] BPH (benign prostatic hyperplasia); CAD (coronary artery disease); Cerebrovascular disease; DVT (deep venous thrombosis) ([facility] Elevated PSA; Hyperlipidemia; Hyperopia with astigmatism and presbyopia; and Hypertension. Social History:   reports that he quit smoking about 13 years ago. His smoking use included Cigarettes. He smoked 0.50 packs per day. He has never used smokeless tobacco. He reports that he drinks alcohol. He reports that he does not use drugs. Family History:   Family History   Problem Relation Age of Onset    Cancer Sister      ?  Cancer Sister      lung     Glaucoma Neg Hx        Vitals:  BP (!) 178/83   Pulse 109   Temp 98.1 °F (36.7 °C)   Resp 25   Ht 5' 10\" (1.778 m)   Wt 232 lb (105.2 kg) Comment: from floor  SpO2 98%   BMI 33.29 kg/m²   Temp (24hrs), Av.3 °F (36.8 °C), Min:97.7 °F (36.5 °C), Max:98.5 °F (36.9 °C)    No results for input(s): POCGLU in the last 72 hours. I/O (24Hr):     Intake/Output Summary (Last 24 hours) at 17 1729  Last data filed at 17 1336   Gross per 24 hour   Intake             1280 ml   Output              675 ml   Net              605 ml       Labs:    Hematology:  Recent Labs      17   0629  17   0712  17   0608   WBC  16.5*  11.8*   --    HGB  14.8  12.6*   --    HCT  46.0  39.1*   --    PLT  See Reflexed IPF Result  See Reflexed Examination:        General appearance:  alert, cooperative and no distress  Mental Status:  oriented to person, place and time and normal affect  Lungs:  clear to auscultation bilaterally, normal effort  Heart:  regular rate and rhythm, no murmur  Abdomen:  soft,No RUQ tenderness, nondistended, normal bowel sounds, no masses, hepatomegaly, splenomegaly  Extremities:  no edema, redness, tenderness in the calves  Skin:  no gross lesions, rashes, induration    Assessment:        Primary Problem  Atypical chest pain-No further cardiac workup planned by cardiology  Acute cholecystitis with cholelithiasis  Impacted CBD stone  Mild QUINTON-due to vomiting/dehydration - improving  Active Hospital Problems    Diagnosis Date Noted    Calculus of gallbladder with acute cholecystitis and obstruction [K80.01] 12/28/2017     Priority: High    Non-intractable vomiting with nausea [R11.2] 12/27/2017     Priority: High    Chronic diastolic CHF (congestive heart failure) (HCC) [I50.32] 12/27/2017     Priority: High    Atypical chest pain [R07.89] 10/22/2017     Priority: High    Atrial fibrillation ([facility] [address] [I48.91] 11/25/2014     Priority: Medium    S/P CABG (coronary artery bypass graft) [Z95.1] 11/21/2014     Priority: Medium    CAD (coronary artery disease), native coronary artery [I25.10] 11/14/2014     Priority: Medium    Hypertension [I10]      Priority: Medium    AAA (abdominal aortic aneurysm) without rupture ([facility] [address] [I71.4] 10/30/2014     Priority: Low    BPH (benign prostatic hyperplasia) [N40.0]      Priority: Low       Plan:        1. No further cardiac workup planned by cardiology  2.  nothing by mouth for cholecystectomy today  3. Continue IV Levaquin for now  4.  Monitor labs  5.  replace electrolytes as needed    Jaya Ramirez MD  12/30/2017  5:29 PM

## 2017-12-30 NOTE — PROGRESS NOTES
Attempted to see pt and was not available. Pt was in surgery. Will follow up tomorrow.     Viktoria Beatty, MUNIRA  [facility]

## 2017-12-31 LAB
ANION GAP SERPL CALCULATED.3IONS-SCNC: 16 MMOL/L (ref 9–17)
BUN BLDV-MCNC: 31 MG/DL (ref 8–23)
BUN/CREAT BLD: ABNORMAL (ref 9–20)
CALCIUM SERPL-MCNC: 8.3 MG/DL (ref 8.6–10.4)
CHLORIDE BLD-SCNC: 104 MMOL/L (ref 98–107)
CO2: 22 MMOL/L (ref 20–31)
CREAT SERPL-MCNC: 1.46 MG/DL (ref 0.7–1.2)
GFR AFRICAN AMERICAN: 56 ML/MIN
GFR NON-AFRICAN AMERICAN: 46 ML/MIN
GFR SERPL CREATININE-BSD FRML MDRD: ABNORMAL ML/MIN/{1.73_M2}
GFR SERPL CREATININE-BSD FRML MDRD: ABNORMAL ML/MIN/{1.73_M2}
GLUCOSE BLD-MCNC: 126 MG/DL (ref 70–99)
POTASSIUM SERPL-SCNC: 4.4 MMOL/L (ref 3.7–5.3)
SODIUM BLD-SCNC: 142 MMOL/L (ref 135–144)

## 2017-12-31 PROCEDURE — 2580000003 HC RX 258: Performed by: INTERNAL MEDICINE

## 2017-12-31 PROCEDURE — 6360000002 HC RX W HCPCS: Performed by: INTERNAL MEDICINE

## 2017-12-31 PROCEDURE — 99232 SBSQ HOSP IP/OBS MODERATE 35: CPT | Performed by: INTERNAL MEDICINE

## 2017-12-31 PROCEDURE — 6370000000 HC RX 637 (ALT 250 FOR IP): Performed by: SURGERY

## 2017-12-31 PROCEDURE — 1200000000 HC SEMI PRIVATE

## 2017-12-31 PROCEDURE — 2500000003 HC RX 250 WO HCPCS: Performed by: INTERNAL MEDICINE

## 2017-12-31 PROCEDURE — S0028 INJECTION, FAMOTIDINE, 20 MG: HCPCS | Performed by: INTERNAL MEDICINE

## 2017-12-31 PROCEDURE — 94762 N-INVAS EAR/PLS OXIMTRY CONT: CPT

## 2017-12-31 PROCEDURE — 80048 BASIC METABOLIC PNL TOTAL CA: CPT

## 2017-12-31 PROCEDURE — 36415 COLL VENOUS BLD VENIPUNCTURE: CPT

## 2017-12-31 PROCEDURE — 6370000000 HC RX 637 (ALT 250 FOR IP): Performed by: INTERNAL MEDICINE

## 2017-12-31 RX ORDER — 0.9 % SODIUM CHLORIDE 0.9 %
10 VIAL (ML) INJECTION DAILY
Status: DISCONTINUED | OUTPATIENT
Start: 2017-12-31 | End: 2017-12-31

## 2017-12-31 RX ORDER — LEVOFLOXACIN 500 MG/1
500 TABLET, FILM COATED ORAL DAILY
Status: DISCONTINUED | OUTPATIENT
Start: 2017-12-31 | End: 2018-01-01 | Stop reason: HOSPADM

## 2017-12-31 RX ORDER — FAMOTIDINE 20 MG/1
20 TABLET, FILM COATED ORAL DAILY
Status: DISCONTINUED | OUTPATIENT
Start: 2017-12-31 | End: 2017-12-31

## 2017-12-31 RX ORDER — FAMOTIDINE 20 MG/1
20 TABLET, FILM COATED ORAL DAILY
Status: DISCONTINUED | OUTPATIENT
Start: 2018-01-01 | End: 2017-12-31

## 2017-12-31 RX ORDER — PROMETHAZINE HYDROCHLORIDE 25 MG/ML
12.5 INJECTION, SOLUTION INTRAMUSCULAR; INTRAVENOUS EVERY 6 HOURS PRN
Status: DISCONTINUED | OUTPATIENT
Start: 2017-12-31 | End: 2018-01-01 | Stop reason: HOSPADM

## 2017-12-31 RX ORDER — ESOMEPRAZOLE SODIUM 40 MG/5ML
40 INJECTION INTRAVENOUS 2 TIMES DAILY
Status: DISCONTINUED | OUTPATIENT
Start: 2018-01-01 | End: 2018-01-01

## 2017-12-31 RX ORDER — CHLORHEXIDINE GLUCONATE 0.12 MG/ML
15 RINSE ORAL 2 TIMES DAILY
Status: DISCONTINUED | OUTPATIENT
Start: 2017-12-31 | End: 2018-01-01 | Stop reason: HOSPADM

## 2017-12-31 RX ORDER — 0.9 % SODIUM CHLORIDE 0.9 %
10 VIAL (ML) INJECTION DAILY
Status: DISCONTINUED | OUTPATIENT
Start: 2018-01-01 | End: 2018-01-01 | Stop reason: HOSPADM

## 2017-12-31 RX ORDER — ESOMEPRAZOLE SODIUM 40 MG/5ML
40 INJECTION INTRAVENOUS DAILY
Status: DISCONTINUED | OUTPATIENT
Start: 2017-12-31 | End: 2017-12-31

## 2017-12-31 RX ADMIN — FAMOTIDINE 20 MG: 10 INJECTION, SOLUTION INTRAVENOUS at 09:01

## 2017-12-31 RX ADMIN — PROMETHAZINE HYDROCHLORIDE 12.5 MG: 25 INJECTION INTRAMUSCULAR; INTRAVENOUS at 14:32

## 2017-12-31 RX ADMIN — METOPROLOL TARTRATE 12.5 MG: 25 TABLET ORAL at 21:43

## 2017-12-31 RX ADMIN — CHLORHEXIDINE GLUCONATE 15 ML: 1.2 RINSE ORAL at 10:44

## 2017-12-31 RX ADMIN — Medication 10 ML: at 21:45

## 2017-12-31 RX ADMIN — PROMETHAZINE HYDROCHLORIDE 12.5 MG: 25 INJECTION INTRAMUSCULAR; INTRAVENOUS at 22:00

## 2017-12-31 RX ADMIN — ESOMEPRAZOLE SODIUM 40 MG: 40 INJECTION INTRAVENOUS at 14:19

## 2017-12-31 RX ADMIN — LEVOFLOXACIN 500 MG: 500 TABLET, FILM COATED ORAL at 14:21

## 2017-12-31 RX ADMIN — ONDANSETRON 4 MG: 2 INJECTION, SOLUTION INTRAMUSCULAR; INTRAVENOUS at 10:44

## 2017-12-31 RX ADMIN — BETHANECHOL CHLORIDE 25 MG: 25 TABLET ORAL at 05:06

## 2017-12-31 RX ADMIN — BETHANECHOL CHLORIDE 25 MG: 25 TABLET ORAL at 14:18

## 2017-12-31 RX ADMIN — BENZOCAINE, MENTHOL 1 LOZENGE: 15; 3.6 LOZENGE ORAL at 14:18

## 2017-12-31 RX ADMIN — DOCUSATE SODIUM 100 MG: 100 CAPSULE, LIQUID FILLED ORAL at 21:44

## 2017-12-31 RX ADMIN — BETHANECHOL CHLORIDE 25 MG: 25 TABLET ORAL at 21:44

## 2017-12-31 RX ADMIN — SODIUM CHLORIDE: 9 INJECTION, SOLUTION INTRAVENOUS at 21:50

## 2017-12-31 RX ADMIN — SIMVASTATIN 20 MG: 20 TABLET, FILM COATED ORAL at 21:44

## 2017-12-31 RX ADMIN — APIXABAN 2.5 MG: 2.5 TABLET, FILM COATED ORAL at 14:21

## 2017-12-31 RX ADMIN — SODIUM CHLORIDE 10 ML: 9 INJECTION, SOLUTION INTRAMUSCULAR; INTRAVENOUS; SUBCUTANEOUS at 14:19

## 2017-12-31 RX ADMIN — APIXABAN 2.5 MG: 2.5 TABLET, FILM COATED ORAL at 21:44

## 2017-12-31 RX ADMIN — Medication 10 ML: at 15:22

## 2017-12-31 NOTE — PLAN OF CARE
Problem: Pain:  Goal: Pain level will decrease  Pain level will decrease   Outcome: Ongoing      Problem: Falls - Risk of:  Goal: Will remain free from falls  Will remain free from falls   Outcome: Ongoing

## 2017-12-31 NOTE — PROGRESS NOTES
[facility]    Gastroenterology Daily Progress Note      Patient:   Virginie Hou   :    1933   Facility:   [facility]   Date:     2017  Consultant:   Sunny Clements CNP    CC: Abdominal pain, Abnormal MRCP - CDB stone    Subjective:     80 y.o. male admitted 2017 with Chest pain [R07.9] and seen for abnormal MRCP/ CBD stone. 2017 - S/P ERCP with biliary sphincterotomy and balloon sweep. 2017 - S/P Lap malgorzata    Patient seen and examined. C/O not being able to eat or drink secondary to odynophagia. Reports sore throat following ERCP but worse after lap malgorzata.    States having episode of emesis this AM. Also having hiccups   Abdominal pain tolerable      Objective     Scheduled Meds:   chlorhexidine  15 mL Mouth/Throat BID    levofloxacin  500 mg Intravenous Q24H    amLODIPine  5 mg Oral Daily    bethanechol  25 mg Oral Q6H    metoprolol tartrate  12.5 mg Oral Nightly    simvastatin  20 mg Oral Nightly    tamsulosin  0.4 mg Oral Daily    sodium chloride flush  10 mL Intravenous 2 times per day    docusate sodium  100 mg Oral BID    famotidine (PEPCID) injection  20 mg Intravenous BID       Vital Signs:  BP (!) 161/68   Pulse 61   Temp 97.8 °F (36.6 °C) (Oral)   Resp 22   Ht 5' 10\" (1.778 m)   Wt 230 lb 12.8 oz (104.7 kg)   SpO2 97%   BMI 33.12 kg/m²      Physical Exam:   General appearance: Alert & oriented, NAD  Lungs: CTA bilaterally    Heart: S1S2 RRR  Abdomen: Soft, obese, surgical tenderness, Not distended, BS WNL  Skin: No jaundice, No clubbing, No cyanosis    Lab and Imaging Review     CBC  Recent Labs      17   0712  17   0608   WBC  11.8*   --    HGB  12.6*   --    HCT  39.1*   --    MCV  93.5   --    PLT  See Reflexed IPF Result  124*       BMP  Recent Labs      17   1811  17   0608  17   0717   NA  137  139  142   K  3.8  4.1  4.4   CL  100  102  104   CO2  24  26  22

## 2017-12-31 NOTE — PROGRESS NOTES
Wounds clean, c/o sore throat. Will do warm saline gargles,peridex. advance diet. Urology managing urinary retention/BPH issues with wilson/meds.

## 2017-12-31 NOTE — PLAN OF CARE
Problem: Pain:  Goal: Control of acute pain  Control of acute pain   Outcome: Ongoing      Problem: Falls - Risk of:  Goal: Will remain free from falls  Will remain free from falls   Outcome: Ongoing

## 2017-12-31 NOTE — PLAN OF CARE
Problem: Pain:  Goal: Pain level will decrease  Pain level will decrease   Outcome: Ongoing  Pain will decrease, pt stated no pain, educated on pain scale,pharmological and non phagological pain control methods.  Pt stated understanding

## 2017-12-31 NOTE — PROGRESS NOTES
General Surgery:  Daily Progress Note          POD # 1 s/p lap malgorzata          PATIENT NAME: Cary Cornell     TODAY'S DATE: 12/31/2017, 7:35 AM    SUBJECTIVE:     Pt seen and examined at bedside. No acute events overnight. Tolerating PO intake. Abdominal pain controlled with pain meds. Encouraged deep breathing and ambulation. Admits to flatus      OBJECTIVE:   VITALS:  /61   Pulse 64   Temp 98.2 °F (36.8 °C) (Oral)   Resp 25   Ht 5' 10\" (1.778 m)   Wt 230 lb 12.8 oz (104.7 kg)   SpO2 91%   BMI 33.12 kg/m²      INTAKE/OUTPUT:      Intake/Output Summary (Last 24 hours) at 12/31/17 0716  Last data filed at 12/31/17 8607   Gross per 24 hour   Intake             2284 ml   Output              725 ml   Net             1559 ml       PHYSICAL EXAM:  General Appearance: awake, alert, oriented, in no acute distress  HEENT:  Normocephalic, atraumatic, mucus membranes moist   Heart: Heart sounds are normal.  Regular rate and rhythm without murmur, gallop or rub. Lungs: Normal expansion. Clear to auscultation. No rales, rhonchi, or wheezing. Abdomen: soft, NT, ND, BS present, incisions c/d/i    Extremities: No cyanosis, pitting edema, rashes noted. Skin: Skin color, texture, turgor normal. No rashes or lesions.   Data:  CBC with Differential:    Lab Results   Component Value Date    WBC 11.8 12/29/2017    RBC 4.18 12/29/2017    HGB 12.6 12/29/2017    HCT 39.1 12/29/2017     12/30/2017    MCV 93.5 12/29/2017    MCH 30.1 12/29/2017    MCHC 32.2 12/29/2017    RDW 14.3 12/29/2017    LYMPHOPCT 7 12/27/2017    MONOPCT 9 12/27/2017    MYELOPCT 2 11/25/2014    BASOPCT 1 12/27/2017    MONOSABS 1.30 12/27/2017    LYMPHSABS 0.90 12/27/2017    EOSABS 0.00 12/27/2017    BASOSABS 0.10 12/27/2017    DIFFTYPE NOT REPORTED 12/27/2017     CMP:    Lab Results   Component Value Date     12/30/2017    K 4.1 12/30/2017     12/30/2017    CO2 26 12/30/2017    BUN 38 12/30/2017    CREATININE 1.66 12/30/2017

## 2018-01-01 VITALS
WEIGHT: 230.8 LBS | SYSTOLIC BLOOD PRESSURE: 131 MMHG | OXYGEN SATURATION: 95 % | BODY MASS INDEX: 33.04 KG/M2 | TEMPERATURE: 98.2 F | RESPIRATION RATE: 18 BRPM | DIASTOLIC BLOOD PRESSURE: 58 MMHG | HEART RATE: 59 BPM | HEIGHT: 70 IN

## 2018-01-01 LAB
ANION GAP SERPL CALCULATED.3IONS-SCNC: 13 MMOL/L (ref 9–17)
BUN BLDV-MCNC: 27 MG/DL (ref 8–23)
BUN/CREAT BLD: ABNORMAL (ref 9–20)
CALCIUM SERPL-MCNC: 8.2 MG/DL (ref 8.6–10.4)
CHLORIDE BLD-SCNC: 105 MMOL/L (ref 98–107)
CO2: 23 MMOL/L (ref 20–31)
CREAT SERPL-MCNC: 1.23 MG/DL (ref 0.7–1.2)
GFR AFRICAN AMERICAN: >60 ML/MIN
GFR NON-AFRICAN AMERICAN: 56 ML/MIN
GFR SERPL CREATININE-BSD FRML MDRD: ABNORMAL ML/MIN/{1.73_M2}
GFR SERPL CREATININE-BSD FRML MDRD: ABNORMAL ML/MIN/{1.73_M2}
GLUCOSE BLD-MCNC: 99 MG/DL (ref 70–99)
PLATELET # BLD: 122 K/UL (ref 138–453)
POTASSIUM SERPL-SCNC: 3.9 MMOL/L (ref 3.7–5.3)
SODIUM BLD-SCNC: 141 MMOL/L (ref 135–144)

## 2018-01-01 PROCEDURE — 6370000000 HC RX 637 (ALT 250 FOR IP): Performed by: INTERNAL MEDICINE

## 2018-01-01 PROCEDURE — 99232 SBSQ HOSP IP/OBS MODERATE 35: CPT | Performed by: INTERNAL MEDICINE

## 2018-01-01 PROCEDURE — 2580000003 HC RX 258: Performed by: INTERNAL MEDICINE

## 2018-01-01 PROCEDURE — 80048 BASIC METABOLIC PNL TOTAL CA: CPT

## 2018-01-01 PROCEDURE — 2500000003 HC RX 250 WO HCPCS: Performed by: INTERNAL MEDICINE

## 2018-01-01 PROCEDURE — 36415 COLL VENOUS BLD VENIPUNCTURE: CPT

## 2018-01-01 PROCEDURE — 51798 US URINE CAPACITY MEASURE: CPT

## 2018-01-01 PROCEDURE — 85049 AUTOMATED PLATELET COUNT: CPT

## 2018-01-01 PROCEDURE — 94762 N-INVAS EAR/PLS OXIMTRY CONT: CPT

## 2018-01-01 RX ORDER — HYDROCODONE BITARTRATE AND ACETAMINOPHEN 5; 325 MG/1; MG/1
1 TABLET ORAL EVERY 4 HOURS PRN
Qty: 15 TABLET | Refills: 0 | Status: SHIPPED | OUTPATIENT
Start: 2018-01-01 | End: 2018-01-08

## 2018-01-01 RX ORDER — PANTOPRAZOLE SODIUM 40 MG/1
40 TABLET, DELAYED RELEASE ORAL
Qty: 30 TABLET | Refills: 3 | Status: SHIPPED | OUTPATIENT
Start: 2018-01-02 | End: 2019-07-11

## 2018-01-01 RX ORDER — LEVOFLOXACIN 500 MG/1
500 TABLET, FILM COATED ORAL DAILY
Qty: 7 TABLET | Refills: 0 | Status: SHIPPED | OUTPATIENT
Start: 2018-01-02 | End: 2018-01-09

## 2018-01-01 RX ORDER — PANTOPRAZOLE SODIUM 40 MG/1
40 TABLET, DELAYED RELEASE ORAL
Status: DISCONTINUED | OUTPATIENT
Start: 2018-01-02 | End: 2018-01-01 | Stop reason: HOSPADM

## 2018-01-01 RX ADMIN — APIXABAN 2.5 MG: 2.5 TABLET, FILM COATED ORAL at 09:11

## 2018-01-01 RX ADMIN — ESOMEPRAZOLE SODIUM 40 MG: 40 INJECTION INTRAVENOUS at 06:33

## 2018-01-01 RX ADMIN — BETHANECHOL CHLORIDE 25 MG: 25 TABLET ORAL at 06:36

## 2018-01-01 RX ADMIN — DOCUSATE SODIUM 100 MG: 100 CAPSULE, LIQUID FILLED ORAL at 09:11

## 2018-01-01 RX ADMIN — BETHANECHOL CHLORIDE 25 MG: 25 TABLET ORAL at 13:13

## 2018-01-01 RX ADMIN — AMLODIPINE BESYLATE 5 MG: 5 TABLET ORAL at 09:11

## 2018-01-01 RX ADMIN — Medication 10 ML: at 09:12

## 2018-01-01 RX ADMIN — SODIUM CHLORIDE 10 ML: 9 INJECTION, SOLUTION INTRAMUSCULAR; INTRAVENOUS; SUBCUTANEOUS at 09:25

## 2018-01-01 RX ADMIN — LEVOFLOXACIN 500 MG: 500 TABLET, FILM COATED ORAL at 09:11

## 2018-01-01 RX ADMIN — TAMSULOSIN HYDROCHLORIDE 0.4 MG: 0.4 CAPSULE ORAL at 09:11

## 2018-01-01 ASSESSMENT — ENCOUNTER SYMPTOMS
NAUSEA: 0
DIARRHEA: 0
HEARTBURN: 0
VOMITING: 0
SHORTNESS OF BREATH: 0
COUGH: 0
SPUTUM PRODUCTION: 0
ABDOMINAL PAIN: 1

## 2018-01-01 ASSESSMENT — PAIN SCALES - GENERAL: PAINLEVEL_OUTOF10: 0

## 2018-01-01 NOTE — DISCHARGE SUMMARY
intravenous contrast. COMPARISON: Gallbladder ultrasound December 28, 2017. HISTORY: ORDERING SYSTEM PROVIDED HISTORY: Right upper quadrant pain. History of cholelithiasis. FINDINGS: The liver appears normal in contour with multiple T2 hyperintense lesions, incompletely characterized on today's study due to lack of IV contrast, largest measuring 4 cm in greatest axial dimension within segment 8 of the liver most likely representing cysts. No evidence of steatosis. There is minimal intrahepatic bile duct dilatation. MRCP images demonstrate diffuse dilatation of the common bile duct measuring 2 cm in greatest dimension. There appears to be abrupt cut off at the level of the ampulla best seen on series 9, image 13 which may represent an impacted stone. There is associated mild diffuse pancreatic duct dilatation. Gallbladder demonstrates a large gallstone seen within the gallbladder neck with diffuse gallbladder wall thickening and mild surrounding pericholecystic fluid, similar to the gallbladder ultrasound study. No evidence of pancreatic divisum. Pancreas and spleen appear grossly unremarkable. Adrenal glands appear grossly unremarkable. Presumed cysts are identified within the right kidney, largest measuring 4 cm. Left kidney appears unremarkable. No bulky lymphadenopathy or ascites. Patient status post endovascular graft repair of a abdominal aortic aneurysm. Trace bilateral pleural effusions. *Dilatation of the common bile duct with abrupt cut off at the level of the ampulla which may represent an impacted stone. There is associated mild diffuse pancreatic duct dilatation. Further evaluation with ERCP is recommended. *Large stone involving the gallbladder neck with associated gallbladder wall thickening and small amount of pericholecystic fluid, similar to the ultrasound study. Developing acute cholecystitis cannot be excluded. *Presumed liver and renal cysts. *Trace bilateral pleural effusions. Consultations:    Consults:     Final Specialist Recommendations/Findings:   IP CONSULT TO CARDIOLOGY  IP CONSULT TO GENERAL SURGERY  IP CONSULT TO GI  IP CONSULT TO UROLOGY  IP CONSULT TO UROLOGY      Discharge plan:   Diet as tolerated  Activity as tolerated  Urology evaluation - voiding trial  ACE on hold  Lasix on hold  Surgical evaluation - follow-up as scheduled  Cardiology evaluation - Eliquis resumed  Transition to oral therapy  Will monitor and control Blood Pressure   Discharge planning  Home this evening if stable and okay with other services    Discharged Condition:    Stable     Disposition: Home    Physician Follow Up:   Ondina Valdovinos MD  [place] [id number]  [address] [place] [address] Dr, NP   [address]  [phone]      For wound re-check       Diet:   Cardiac/as tolerated    Activity:   As tolerated    Discharge Medications:      Medication List      START taking these medications    HYDROcodone-acetaminophen 5-325 MG per tablet  Commonly known as:  NORCO  Take 1 tablet by mouth every 4 hours as needed for Pain for up to 7 days.      levofloxacin 500 MG tablet  Commonly known as:  LEVAQUIN  Take 1 tablet by mouth daily for 7 days  Start taking on:  1/2/2018     pantoprazole 40 MG tablet  Commonly known as:  PROTONIX  Take 1 tablet by mouth every morning (before breakfast)  Start taking on:  1/2/2018        CONTINUE taking these medications    amLODIPine 5 MG tablet  Commonly known as:  NORVASC  Take 1 tablet by mouth daily     apixaban 2.5 MG Tabs tablet  Commonly known as:  ELIQUIS  Take 1 tablet by mouth 2 times daily     aspirin 81 MG tablet     bethanechol 25 MG tablet  Commonly known as:  URECHOLINE  Take 1 tablet by mouth every 6 hours     metoprolol succinate 25 MG extended release tablet  Commonly known as:  TOPROL XL  Take 0.5 tablets

## 2018-01-01 NOTE — CONSULTS
[facility]  Urology Consult      Patient:  Linette Marie  MRN: [id number]  YOB: 1933    CHIEF COMPLAINT:  retention    HISTORY OF PRESENT ILLNESS:   The patient is a 80 y.o. male with known BPH seen by Dr. Angie Lainez in 2014 currently managed medically with flomax by PCP who presents with acute colecystitis POD #2 lap malgorzata, wilson had to be replaced post-op due to retention for volume of 700mL. Patient is comfortable with baseline voiding function. Nocturia x3-4, no daytime frequency/urgency. Feels he empties all the way. No UTI, hematuria. Cystoscopy in 2014 showed obstructing trilobar hyperplasia, mild trabeculation, small cellules. Ambulating. No bm. Pain controlled. Last PSA 2015 of 4, free 29%    Patient's old records, notes and chart reviewed and summarized above.     Past Medical History:    Past Medical History:   Diagnosis Date    Aneurysm of infrarenal abdominal aorta (HCC)     Atrial fibrillation ([facility] [address] 11/25/2014    Cardioversion successful    BPH (benign prostatic hyperplasia)     CAD (coronary artery disease)     Cerebrovascular disease     DVT (deep venous thrombosis) ([facility] [address] 1960's    left leg    Elevated PSA     Hyperlipidemia     Hyperopia with astigmatism and presbyopia     Hypertension        Past Surgical History:    Past Surgical History:   Procedure Laterality Date    BACK SURGERY      CARDIAC SURGERY  11-17-14    CHOLECYSTECTOMY, LAPAROSCOPIC  12/30/2017    CORONARY ARTERY BYPASS GRAFT  11/17/14    OP CABG X 4- Dr Jr Rodrigues ERCP  12/29/2017    DUODENAL SPHINCTEROPLASTY, ERCP with dilation balloon    KNEE SURGERY Left     reconstruction post AA       Medications:      Current Facility-Administered Medications:     chlorhexidine (PERIDEX) 0.12 % solution 15 mL, 15 mL, Mouth/Throat, BID, Jay Fry DO, 15 mL at 12/31/17 1044    levofloxacin (LEVAQUIN) tablet 500 mg, 500 mg, Oral, Daily, Emmanuel Coles MD, 500 mg at 01/01/18 0911    Magic Mouthwash (Miracle Mouthwash) 10 mL, 10 mL, Swish & Swallow, 4x daily, Zaida Byrne MD, 10 mL at 12/31/17 2145    promethazine (PHENERGAN) injection 12.5 mg, 12.5 mg, Intravenous, Q6H PRN, Zaida Byrne MD, 12.5 mg at 12/31/17 2200    benzocaine-menthol (CEPACOL SORE THROAT) lozenge 1 lozenge, 1 lozenge, Oral, Q2H PRN, Zaida Byrne MD, 1 lozenge at 12/31/17 1418    apixaban (ELIQUIS) tablet 2.5 mg, 2.5 mg, Oral, BID, Zaida Byrne MD, 2.5 mg at 01/01/18 0911    esomeprazole (NEXIUM) injection 40 mg, 40 mg, Intravenous, BID, 40 mg at 01/01/18 4024 **AND** sodium chloride (PF) 0.9 % injection 10 mL, 10 mL, Intravenous, Daily, Sagar Lamas MD, 10 mL at 01/01/18 0925    0.9 % sodium chloride infusion, , Intravenous, Continuous, Tia Cody MD, Last Rate: 100 mL/hr at 12/31/17 2150    amLODIPine (NORVASC) tablet 5 mg, 5 mg, Oral, Daily, Tia Cody MD, 5 mg at 01/01/18 0911    bethanechol (URECHOLINE) tablet 25 mg, 25 mg, Oral, Q6H, Tia Cody MD, 25 mg at 01/01/18 0636    metoprolol tartrate (LOPRESSOR) tablet 12.5 mg, 12.5 mg, Oral, Nightly, Tia Cody MD, 12.5 mg at 12/31/17 2143    simvastatin (ZOCOR) tablet 20 mg, 20 mg, Oral, Nightly, Tia Cody MD, 20 mg at 12/31/17 2144    tamsulosin (FLOMAX) capsule 0.4 mg, 0.4 mg, Oral, Daily, Tia Cody MD, 0.4 mg at 01/01/18 0911    sodium chloride flush 0.9 % injection 10 mL, 10 mL, Intravenous, 2 times per day, Zaida Byrne MD, 10 mL at 01/01/18 0912    sodium chloride flush 0.9 % injection 10 mL, 10 mL, Intravenous, PRN, Tia Cody MD    potassium chloride (KLOR-CON M) extended release tablet 40 mEq, 40 mEq, Oral, PRN **OR** potassium chloride 20 MEQ/15ML (10%) oral solution 40 mEq, 40 mEq, Oral, PRN **OR** potassium chloride 10 mEq/100 mL IVPB (Peripheral Line), 10 mEq, Intravenous, PRN, Tia Cody MD    potassium chloride 10 mEq/100 mL IVPB (Peripheral Line), 10 mEq, Intravenous, PRN, Zeke Bernal MD    magnesium sulfate 1 g in dextrose 5% 100 mL IVPB, 1 g, Intravenous, PRN, Zeke Bernal MD    acetaminophen (TYLENOL) tablet 650 mg, 650 mg, Oral, Q4H PRN, Tia Tim MD    HYDROcodone-acetaminophen [facility]) 5-325 MG per tablet 1 tablet, 1 tablet, Oral, Q4H PRN, Zeke Bernal MD    morphine injection 2 mg, 2 mg, Intravenous, Q2H PRN, 2 mg at 12/30/17 2018 **OR** morphine (PF) injection 4 mg, 4 mg, Intravenous, Q2H PRN, Tia Tim MD    magnesium hydroxide (MILK OF MAGNESIA) 400 MG/5ML suspension 30 mL, 30 mL, Oral, Daily PRN, Tia Tim MD    docusate sodium (COLACE) capsule 100 mg, 100 mg, Oral, BID, Zeke Bernal MD, 100 mg at 01/01/18 0911    bisacodyl (DULCOLAX) suppository 10 mg, 10 mg, Rectal, Daily PRN, Tia Tim MD    ondansetron [facility]) injection 4 mg, 4 mg, Intravenous, Q6H PRN, Zeke Bernal MD, 4 mg at 12/31/17 1044    nitroGLYCERIN (NITROSTAT) SL tablet 0.4 mg, 0.4 mg, Sublingual, Q5 Min PRN, Zeke Bernal MD    Allergies:  No Known Allergies    Social History:   Social History     Social History    Marital status:      Spouse name: N/A    Number of children: N/A    Years of education: N/A     Occupational History    Not on file. Social History Main Topics    Smoking status: Former Smoker     Packs/day: 0.50     Types: Cigarettes     Quit date: 12/10/2004    Smokeless tobacco: Never Used      Comment: Fang Go RRT 10/22/17    Alcohol use Yes      Comment: occassional    Drug use: No    Sexual activity: Not on file     Other Topics Concern    Not on file     Social History Narrative    No narrative on file       Family History:    Family History   Problem Relation Age of Onset    Cancer Sister      ?  Cancer Sister      lung     Glaucoma Neg Hx        REVIEW OF SYSTEMS:  A comprehensive 14 point review of systems was obtained.   Constitutional: No fatigue  Eyes: No blurry vision  Ears, nose, mildly dilated measuring up to 4 mm. RIGHT KIDNEY: Right lower pole renal cyst noted. OTHER: No evidence of right upper quadrant ascites. Findings consistent with acute cholecystitis in the appropriate clinical setting. Dilated common bile and pancreatic ducts. Further evaluation with year CP/MRCP could be performed as clinically indicated. Xr Chest Portable    Result Date: 12/27/2017  EXAMINATION: SINGLE VIEW OF THE CHEST 12/27/2017 8:06 pm COMPARISON: December 27, 2017 HISTORY: ORDERING SYSTEM PROVIDED HISTORY: chf TECHNOLOGIST PROVIDED HISTORY: Reason for exam:->chf FINDINGS: Cardiomegaly noted there has been median sternotomy. Aortic arch is mildly ectatic and partially calcified. Lung fields are well aerated. There is no evidence of infiltrates signs of congestion. No sizable pleural effusions identified. No acute cardiopulmonary disease.        Assessment and Plan   Impression:    Patient Active Problem List   Diagnosis    Hypertension    Cerebrovascular disease    BPH (benign prostatic hyperplasia)    Iliac artery aneurysm, right ([address]    AAA (abdominal aortic aneurysm) without rupture ([address]    CAD (coronary artery disease), native coronary artery    S/P CABG (coronary artery bypass graft)    Dysphagia    CHF (congestive heart failure) (HCC)    Dyspnea    Atrial fibrillation ([address]    Urinary retention with incomplete bladder emptying    Cellulitis of right lower extremity    Tobacco abuse    HLD (hyperlipidemia)    Hyperglycemia    Renal insufficiency    Sore throat    Esophageal candidiasis (HCC)    ARF (acute renal failure) (HCC)    Cataract    Hyperopia of both eyes with astigmatism and presbyopia    Atypical chest pain    Non-intractable vomiting with nausea    Chronic diastolic CHF (congestive heart failure) (HCC)    Calculus of gallbladder with acute cholecystitis and obstruction           Plan:   Retention- post op, contributing factors include underlying BPH,

## 2018-01-01 NOTE — PROGRESS NOTES
associated mild  diffuse pancreatic duct dilatation.  Further evaluation with ERCP is  recommended. *Large stone involving the gallbladder neck with associated gallbladder wall  thickening and small amount of pericholecystic fluid, similar to the  ultrasound study.  Developing acute cholecystitis cannot be excluded. *Presumed liver and renal cysts. *Trace bilateral pleural effusions.      EKG:  Sinus tachycardia with 1st degree A-V block with frequent Premature ventricular complexes  Inferior infarct (cited on or before 23-OCT-2017)  Anterior infarct (cited on or before 28-JAN-2015)  T wave abnormality, consider lateral ischemia  Abnormal ECG  When compared with ECG of 23-OCT-2017 07:20,  Questionable change in initial forces of Septal leads  Inverted T waves have replaced nonspecific T wave abnormality in Inferior leads    Assessment:        Primary Problem  Principal Problem:    Calculus of gallbladder with acute cholecystitis and obstruction  Active Problems:    Hypertension    BPH (benign prostatic hyperplasia)    AAA (abdominal aortic aneurysm) without rupture (HCC)    CAD (coronary artery disease), native coronary artery    S/P CABG (coronary artery bypass graft)    Atrial fibrillation (HCC)    Urinary retention with incomplete bladder emptying    Atypical chest pain    Non-intractable vomiting with nausea    Chronic diastolic CHF (congestive heart failure) ([facility] [address]      Plan:        Diet as tolerated  Activity as tolerated  Urology evaluation - voiding trial  ACE on hold  Lasix on hold  Surgical evaluation  Cardiology evaluation - Eliquis resumed  Transition to oral therapy  Will monitor and control Blood Pressure   Discharge planning  Home this evening if stable and okay with other services    IP CONSULT TO CARDIOLOGY  IP CONSULT TO GENERAL SURGERY  IP CONSULT TO GI  IP CONSULT TO UROLOGY  IP CONSULT TO [address], DO  1/1/2018  4:10 PM

## 2018-01-02 ENCOUNTER — TELEPHONE (OUTPATIENT)
Dept: CARDIOLOGY | Age: 83
End: 2018-01-02

## 2018-01-03 LAB — SURGICAL PATHOLOGY REPORT: NORMAL

## 2018-05-02 DIAGNOSIS — N40.0 BENIGN PROSTATIC HYPERPLASIA, UNSPECIFIED WHETHER LOWER URINARY TRACT SYMPTOMS PRESENT: ICD-10-CM

## 2018-05-02 RX ORDER — TAMSULOSIN HYDROCHLORIDE 0.4 MG/1
CAPSULE ORAL
Qty: 90 CAPSULE | Refills: 1 | Status: SHIPPED | OUTPATIENT
Start: 2018-05-02 | End: 2018-10-29 | Stop reason: SDUPTHER

## 2018-05-25 RX ORDER — SIMVASTATIN 20 MG
20 TABLET ORAL NIGHTLY
Qty: 90 TABLET | Refills: 0 | Status: SHIPPED | OUTPATIENT
Start: 2018-05-25 | End: 2018-08-27 | Stop reason: SDUPTHER

## 2018-06-19 RX ORDER — LISINOPRIL 20 MG/1
TABLET ORAL
Qty: 90 TABLET | Refills: 1 | Status: SHIPPED | OUTPATIENT
Start: 2018-06-19 | End: 2018-12-16 | Stop reason: SDUPTHER

## 2018-08-01 RX ORDER — BETHANECHOL CHLORIDE 25 MG/1
25 TABLET ORAL EVERY 6 HOURS
Qty: 120 TABLET | Refills: 3 | Status: SHIPPED | OUTPATIENT
Start: 2018-08-01 | End: 2018-12-17 | Stop reason: SDUPTHER

## 2018-08-27 RX ORDER — SIMVASTATIN 20 MG
TABLET ORAL
Qty: 90 TABLET | Refills: 0 | Status: SHIPPED | OUTPATIENT
Start: 2018-08-27 | End: 2018-11-21 | Stop reason: SDUPTHER

## 2018-10-16 DIAGNOSIS — I10 ESSENTIAL HYPERTENSION: ICD-10-CM

## 2018-10-16 RX ORDER — METOPROLOL SUCCINATE 25 MG/1
TABLET, EXTENDED RELEASE ORAL
Qty: 45 TABLET | Refills: 3 | Status: SHIPPED | OUTPATIENT
Start: 2018-10-16 | End: 2019-07-11

## 2018-10-29 DIAGNOSIS — N40.0 BENIGN PROSTATIC HYPERPLASIA, UNSPECIFIED WHETHER LOWER URINARY TRACT SYMPTOMS PRESENT: ICD-10-CM

## 2018-10-29 RX ORDER — TAMSULOSIN HYDROCHLORIDE 0.4 MG/1
0.4 CAPSULE ORAL DAILY
Qty: 90 CAPSULE | Refills: 0 | Status: SHIPPED | OUTPATIENT
Start: 2018-10-29 | End: 2019-01-28 | Stop reason: SDUPTHER

## 2018-11-06 ENCOUNTER — NURSE ONLY (OUTPATIENT)
Dept: LAB | Age: 83
End: 2018-11-06
Payer: MEDICARE

## 2018-11-06 DIAGNOSIS — Z23 NEED FOR VACCINATION: Primary | ICD-10-CM

## 2018-11-06 PROCEDURE — 90662 IIV NO PRSV INCREASED AG IM: CPT | Performed by: FAMILY MEDICINE

## 2018-11-06 PROCEDURE — G0008 ADMIN INFLUENZA VIRUS VAC: HCPCS | Performed by: FAMILY MEDICINE

## 2018-11-06 NOTE — PROGRESS NOTES
Have you had an allergic reaction to the flu (influenza) shot? no  Are you allergic to eggs or any component of the flu vaccine? no  Do you have a history of Guillain-[place] Syndrome (GBS), which is paralysis after receiving the flu vaccine? no  Are you feeling well today? yes  Flu vaccine given as ordered. Patient tolerated it well. No questions re: VIS information.

## 2018-11-21 RX ORDER — SIMVASTATIN 20 MG
TABLET ORAL
Qty: 90 TABLET | Refills: 0 | Status: SHIPPED | OUTPATIENT
Start: 2018-11-21 | End: 2019-02-19 | Stop reason: SDUPTHER

## 2018-11-28 ENCOUNTER — OFFICE VISIT (OUTPATIENT)
Dept: OPTOMETRY | Age: 83
End: 2018-11-28
Payer: COMMERCIAL

## 2018-11-28 DIAGNOSIS — H52.203 HYPEROPIA OF BOTH EYES WITH ASTIGMATISM AND PRESBYOPIA: Primary | ICD-10-CM

## 2018-11-28 DIAGNOSIS — H52.03 HYPEROPIA OF BOTH EYES WITH ASTIGMATISM AND PRESBYOPIA: Primary | ICD-10-CM

## 2018-11-28 DIAGNOSIS — H52.4 HYPEROPIA OF BOTH EYES WITH ASTIGMATISM AND PRESBYOPIA: Primary | ICD-10-CM

## 2018-11-28 DIAGNOSIS — H25.13 NUCLEAR SCLEROSIS OF BOTH EYES: ICD-10-CM

## 2018-11-28 PROCEDURE — 92014 COMPRE OPH EXAM EST PT 1/>: CPT | Performed by: OPTOMETRIST

## 2018-11-28 ASSESSMENT — REFRACTION_MANIFEST
OD_SPHERE: +2.75
OS_SPHERE: +3.25
OS_ADD: +2.50
OD_ADD: +2.50
OS_AXIS: 090
OS_CYLINDER: -1.25
OD_AXIS: 086
OD_CYLINDER: -1.00

## 2018-11-28 ASSESSMENT — REFRACTION_WEARINGRX
OS_AXIS: 082
OS_CYLINDER: -1.25
SPECS_TYPE: BIFOCAL
OS_ADD: +2.50
OD_SPHERE: +3.25
OD_ADD: +2.50
OS_SPHERE: +3.25
OD_CYLINDER: -1.25
OD_AXIS: 100

## 2018-11-28 ASSESSMENT — VISUAL ACUITY
OD_CC: 20/30 OU
CORRECTION_TYPE: GLASSES
OD_CC+: -1
METHOD: SNELLEN - LINEAR
OS_CC: 20/40

## 2018-11-28 ASSESSMENT — KERATOMETRY
OD_AXISANGLE2_DEGREES: 077
OS_AXISANGLE2_DEGREES: 084
OD_AXISANGLE_DEGREES: 167
OS_K1POWER_DIOPTERS: 45.00
OS_AXISANGLE_DEGREES: 177
OS_K2POWER_DIOPTERS: 46.50
OD_K2POWER_DIOPTERS: 46.00
OD_K1POWER_DIOPTERS: 46.00

## 2018-11-28 ASSESSMENT — TONOMETRY
OD_IOP_MMHG: 27
OS_IOP_MMHG: 18
IOP_METHOD: NON-CONTACT AIR PUFF

## 2018-11-28 ASSESSMENT — SLIT LAMP EXAM - LIDS
COMMENTS: 3+ DERMATOCHALASIS - UPPER LID
COMMENTS: 3+ DERMATOCHALASIS - UPPER LID

## 2018-11-28 NOTE — PROGRESS NOTES
Adams Alves presents today for   Chief Complaint   Patient presents with    Blurred Vision    Vision Exam   .    HPI     Blurred Vision   In both eyes. Vision is blurred. Context:  distance vision. Comments   Last Vision Exam: 9/29/2016 Aw  Last Ophthalmology Exam: n/a  Last Filled Glasses Rx: 9/28/2015  Insurance: Retiree ella  Update: Glasses  He states he has a film over his right eye  Distance is getting harder to see. Is aware that he does have a cataract. Seems like can adjust and see a little better and it clears up             Current Outpatient Prescriptions   Medication Sig Dispense Refill    simvastatin (ZOCOR) 20 MG tablet TAKE 1 TABLET NIGHTLY (PLEASE SCHEDULE AN APPOINTMENT WITH DR. Sourav Ulloa FOR ANY ADDITIONAL REFILLS) 90 tablet 0    tamsulosin (FLOMAX) 0.4 MG capsule Take 1 capsule by mouth daily MUST HAVE APPOINTMENT WITH DR. Sourav Ulloa FOR ANY ADDITIONAL REFILLS!! 90 capsule 0    metoprolol succinate (TOPROL XL) 25 MG extended release tablet TAKE ONE-HALF (1/2) TABLET NIGHTLY 45 tablet 3    bethanechol (URECHOLINE) 25 MG tablet Take 1 tablet by mouth every 6 hours 120 tablet 3    lisinopril (PRINIVIL;ZESTRIL) 20 MG tablet TAKE 1 TABLET DAILY (MUST HAVE APPOINTMENT WITH DR. Sourav Ulloa FOR ANY ADDITIONAL REFILLS) 90 tablet 1    pantoprazole (PROTONIX) 40 MG tablet Take 1 tablet by mouth every morning (before breakfast) 30 tablet 3    apixaban (ELIQUIS) 2.5 MG TABS tablet Take 1 tablet by mouth 2 times daily 180 tablet 3    amLODIPine (NORVASC) 5 MG tablet Take 1 tablet by mouth daily 90 tablet 3    aspirin 81 MG tablet Take 81 mg by mouth daily. No current facility-administered medications for this visit.           Main Ophthalmology Exam     External Exam       Right Left    External Normal Normal          Slit Lamp Exam       Right Left    Lids/Lashes 3+ Dermatochalasis - upper lid 3+ Dermatochalasis - upper lid    Conjunctiva/Sclera White and quiet White and quiet

## 2018-12-16 DIAGNOSIS — I10 ESSENTIAL HYPERTENSION: Primary | ICD-10-CM

## 2018-12-17 RX ORDER — BETHANECHOL CHLORIDE 25 MG/1
25 TABLET ORAL EVERY 6 HOURS
Qty: 120 TABLET | Refills: 3 | Status: SHIPPED | OUTPATIENT
Start: 2018-12-17 | End: 2019-04-01 | Stop reason: SDUPTHER

## 2018-12-17 RX ORDER — LISINOPRIL 20 MG/1
TABLET ORAL
Qty: 90 TABLET | Refills: 0 | Status: SHIPPED | OUTPATIENT
Start: 2018-12-17 | End: 2019-03-18 | Stop reason: SDUPTHER

## 2019-01-28 DIAGNOSIS — N40.0 BENIGN PROSTATIC HYPERPLASIA, UNSPECIFIED WHETHER LOWER URINARY TRACT SYMPTOMS PRESENT: ICD-10-CM

## 2019-01-28 RX ORDER — TAMSULOSIN HYDROCHLORIDE 0.4 MG/1
CAPSULE ORAL
Qty: 90 CAPSULE | Refills: 0 | Status: SHIPPED | OUTPATIENT
Start: 2019-01-28 | End: 2019-04-27 | Stop reason: SDUPTHER

## 2019-02-06 DIAGNOSIS — I48.91 ATRIAL FIBRILLATION, UNSPECIFIED TYPE (HCC): ICD-10-CM

## 2019-02-06 DIAGNOSIS — I10 ESSENTIAL HYPERTENSION: ICD-10-CM

## 2019-02-06 RX ORDER — AMLODIPINE BESYLATE 5 MG/1
5 TABLET ORAL DAILY
Qty: 14 TABLET | Refills: 0 | Status: SHIPPED | OUTPATIENT
Start: 2019-02-06 | End: 2019-07-11 | Stop reason: SINTOL

## 2019-02-06 RX ORDER — AMLODIPINE BESYLATE 5 MG/1
5 TABLET ORAL DAILY
Qty: 90 TABLET | Refills: 3 | Status: SHIPPED | OUTPATIENT
Start: 2019-02-06 | End: 2019-06-26

## 2019-02-19 RX ORDER — SIMVASTATIN 20 MG
TABLET ORAL
Qty: 90 TABLET | Refills: 0 | Status: SHIPPED | OUTPATIENT
Start: 2019-02-19 | End: 2019-04-01 | Stop reason: SDUPTHER

## 2019-03-18 DIAGNOSIS — I10 ESSENTIAL HYPERTENSION: ICD-10-CM

## 2019-03-18 RX ORDER — LISINOPRIL 20 MG/1
20 TABLET ORAL DAILY
Qty: 90 TABLET | Refills: 3 | Status: SHIPPED | OUTPATIENT
Start: 2019-03-18 | End: 2019-07-11

## 2019-04-01 RX ORDER — SIMVASTATIN 20 MG
TABLET ORAL
Qty: 90 TABLET | Refills: 0 | Status: SHIPPED | OUTPATIENT
Start: 2019-04-01 | End: 2019-07-26 | Stop reason: SDUPTHER

## 2019-04-01 RX ORDER — BETHANECHOL CHLORIDE 25 MG/1
25 TABLET ORAL EVERY 6 HOURS
Qty: 120 TABLET | Refills: 3 | Status: SHIPPED | OUTPATIENT
Start: 2019-04-01 | End: 2019-11-01 | Stop reason: SDUPTHER

## 2019-04-01 NOTE — TELEPHONE ENCOUNTER
Patient at window needing refills for two meds. Patient thought he was needing a 90 supply of the Urecholine.

## 2019-04-27 DIAGNOSIS — N40.0 BENIGN PROSTATIC HYPERPLASIA, UNSPECIFIED WHETHER LOWER URINARY TRACT SYMPTOMS PRESENT: ICD-10-CM

## 2019-04-29 RX ORDER — TAMSULOSIN HYDROCHLORIDE 0.4 MG/1
CAPSULE ORAL
Qty: 90 CAPSULE | Refills: 3 | Status: SHIPPED | OUTPATIENT
Start: 2019-04-29 | End: 2020-04-23

## 2019-06-26 ENCOUNTER — OFFICE VISIT (OUTPATIENT)
Dept: PRIMARY CARE CLINIC | Age: 84
End: 2019-06-26
Payer: MEDICARE

## 2019-06-26 ENCOUNTER — TELEPHONE (OUTPATIENT)
Dept: PRIMARY CARE CLINIC | Age: 84
End: 2019-06-26

## 2019-06-26 VITALS
BODY MASS INDEX: 33.79 KG/M2 | RESPIRATION RATE: 18 BRPM | WEIGHT: 236 LBS | DIASTOLIC BLOOD PRESSURE: 70 MMHG | TEMPERATURE: 98 F | OXYGEN SATURATION: 98 % | HEART RATE: 64 BPM | SYSTOLIC BLOOD PRESSURE: 180 MMHG | HEIGHT: 70 IN

## 2019-06-26 DIAGNOSIS — Z12.5 SCREENING PSA (PROSTATE SPECIFIC ANTIGEN): ICD-10-CM

## 2019-06-26 DIAGNOSIS — I10 ESSENTIAL HYPERTENSION: ICD-10-CM

## 2019-06-26 DIAGNOSIS — R60.0 LEG EDEMA, RIGHT: Primary | ICD-10-CM

## 2019-06-26 PROCEDURE — 4040F PNEUMOC VAC/ADMIN/RCVD: CPT | Performed by: FAMILY MEDICINE

## 2019-06-26 PROCEDURE — G8417 CALC BMI ABV UP PARAM F/U: HCPCS | Performed by: FAMILY MEDICINE

## 2019-06-26 PROCEDURE — G8427 DOCREV CUR MEDS BY ELIG CLIN: HCPCS | Performed by: FAMILY MEDICINE

## 2019-06-26 PROCEDURE — 1123F ACP DISCUSS/DSCN MKR DOCD: CPT | Performed by: FAMILY MEDICINE

## 2019-06-26 PROCEDURE — 1036F TOBACCO NON-USER: CPT | Performed by: FAMILY MEDICINE

## 2019-06-26 PROCEDURE — G8598 ASA/ANTIPLAT THER USED: HCPCS | Performed by: FAMILY MEDICINE

## 2019-06-26 PROCEDURE — 99214 OFFICE O/P EST MOD 30 MIN: CPT | Performed by: FAMILY MEDICINE

## 2019-06-26 PROCEDURE — 93970 EXTREMITY STUDY: CPT | Performed by: FAMILY MEDICINE

## 2019-06-26 RX ORDER — BUMETANIDE 1 MG/1
1 TABLET ORAL DAILY
Qty: 30 TABLET | Refills: 3 | Status: SHIPPED | OUTPATIENT
Start: 2019-06-26

## 2019-06-26 ASSESSMENT — PATIENT HEALTH QUESTIONNAIRE - PHQ9
SUM OF ALL RESPONSES TO PHQ QUESTIONS 1-9: 0
SUM OF ALL RESPONSES TO PHQ QUESTIONS 1-9: 0
2. FEELING DOWN, DEPRESSED OR HOPELESS: 0
1. LITTLE INTEREST OR PLEASURE IN DOING THINGS: 0
SUM OF ALL RESPONSES TO PHQ9 QUESTIONS 1 & 2: 0

## 2019-06-26 ASSESSMENT — ENCOUNTER SYMPTOMS
EYES NEGATIVE: 1
GASTROINTESTINAL NEGATIVE: 1
SHORTNESS OF BREATH: 0
ALLERGIC/IMMUNOLOGIC NEGATIVE: 1
RESPIRATORY NEGATIVE: 1
CHEST TIGHTNESS: 0

## 2019-06-26 NOTE — TELEPHONE ENCOUNTER
Pt was seen in UC today. Claudia Yin ordered labs and US. In his notes, indicated a followup in a week. Not sure if he communicated to pt that he should call pre-service dept for appt. He has not been seen here for over a year. Please clarify Prashanth's intentions, and contact patient to make appt if needed and coordinate lab work and [address].
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
## 2019-06-27 ENCOUNTER — HOSPITAL ENCOUNTER (OUTPATIENT)
Dept: LAB | Age: 84
Discharge: HOME | End: 2019-06-27
Payer: MEDICARE

## 2019-06-27 DIAGNOSIS — I10 ESSENTIAL HYPERTENSION: ICD-10-CM

## 2019-06-27 DIAGNOSIS — Z12.5 SCREENING PSA (PROSTATE SPECIFIC ANTIGEN): ICD-10-CM

## 2019-06-27 DIAGNOSIS — R60.0 BILATERAL LEG EDEMA: Primary | ICD-10-CM

## 2019-06-27 LAB
ABSOLUTE EOS #: 0.1 K/UL (ref 0–0.4)
ABSOLUTE IMMATURE GRANULOCYTE: ABNORMAL K/UL (ref 0–0.3)
ABSOLUTE LYMPH #: 1.9 K/UL (ref 1–4.8)
ABSOLUTE MONO #: 0.6 K/UL (ref 0.1–1.2)
ALBUMIN SERPL-MCNC: 4.4 G/DL (ref 3.5–5.2)
ALBUMIN/GLOBULIN RATIO: 1.3 (ref 1–2.5)
ALP BLD-CCNC: 88 U/L (ref 40–129)
ALT SERPL-CCNC: 13 U/L (ref 5–41)
ANION GAP SERPL CALCULATED.3IONS-SCNC: 13 MMOL/L (ref 9–17)
AST SERPL-CCNC: 18 U/L
BASOPHILS # BLD: 1 % (ref 0–2)
BASOPHILS ABSOLUTE: 0.1 K/UL (ref 0–0.2)
BILIRUB SERPL-MCNC: 0.61 MG/DL (ref 0.3–1.2)
BUN BLDV-MCNC: 19 MG/DL (ref 8–23)
BUN/CREAT BLD: 15 (ref 9–20)
CALCIUM SERPL-MCNC: 9.6 MG/DL (ref 8.6–10.4)
CHLORIDE BLD-SCNC: 101 MMOL/L (ref 98–107)
CHOLESTEROL/HDL RATIO: 2.8
CHOLESTEROL: 144 MG/DL
CO2: 25 MMOL/L (ref 20–31)
CREAT SERPL-MCNC: 1.28 MG/DL (ref 0.7–1.2)
DIFFERENTIAL TYPE: ABNORMAL
EOSINOPHILS RELATIVE PERCENT: 1 % (ref 1–8)
GFR AFRICAN AMERICAN: >60 ML/MIN
GFR NON-AFRICAN AMERICAN: 53 ML/MIN
GFR SERPL CREATININE-BSD FRML MDRD: ABNORMAL ML/MIN/{1.73_M2}
GFR SERPL CREATININE-BSD FRML MDRD: ABNORMAL ML/MIN/{1.73_M2}
GLUCOSE BLD-MCNC: 106 MG/DL (ref 70–99)
HCT VFR BLD CALC: 48.6 % (ref 41–53)
HDLC SERPL-MCNC: 51 MG/DL
HEMOGLOBIN: 16.1 G/DL (ref 13.5–17.5)
IMMATURE GRANULOCYTES: ABNORMAL %
LDL CHOLESTEROL: 77 MG/DL (ref 0–130)
LYMPHOCYTES # BLD: 24 % (ref 15–43)
MCH RBC QN AUTO: 31.2 PG (ref 26–34)
MCHC RBC AUTO-ENTMCNC: 33.1 G/DL (ref 31–37)
MCV RBC AUTO: 94.2 FL (ref 80–100)
MONOCYTES # BLD: 8 % (ref 6–14)
NRBC AUTOMATED: ABNORMAL PER 100 WBC
PDW BLD-RTO: 14.9 % (ref 11–14.5)
PLATELET # BLD: 146 K/UL (ref 140–450)
PLATELET ESTIMATE: ABNORMAL
PMV BLD AUTO: 8.4 FL (ref 6–12)
POTASSIUM SERPL-SCNC: 4.3 MMOL/L (ref 3.7–5.3)
PROSTATE SPECIFIC ANTIGEN: 7.61 UG/L
RBC # BLD: 5.16 M/UL (ref 4.5–5.9)
RBC # BLD: ABNORMAL 10*6/UL
SEG NEUTROPHILS: 66 % (ref 44–74)
SEGMENTED NEUTROPHILS ABSOLUTE COUNT: 5.4 K/UL (ref 1.8–7.7)
SODIUM BLD-SCNC: 139 MMOL/L (ref 135–144)
TOTAL PROTEIN: 7.8 G/DL (ref 6.4–8.3)
TRIGL SERPL-MCNC: 81 MG/DL
VLDLC SERPL CALC-MCNC: NORMAL MG/DL (ref 1–30)
WBC # BLD: 8.1 K/UL (ref 3.5–11)
WBC # BLD: ABNORMAL 10*3/UL

## 2019-06-27 PROCEDURE — G0103 PSA SCREENING: HCPCS

## 2019-06-27 PROCEDURE — 80061 LIPID PANEL: CPT

## 2019-06-27 PROCEDURE — 36415 COLL VENOUS BLD VENIPUNCTURE: CPT

## 2019-06-27 PROCEDURE — 80053 COMPREHEN METABOLIC PANEL: CPT

## 2019-06-27 PROCEDURE — 85025 COMPLETE CBC W/AUTO DIFF WBC: CPT

## 2019-06-28 ENCOUNTER — HOSPITAL ENCOUNTER (OUTPATIENT)
Dept: INTERVENTIONAL RADIOLOGY/VASCULAR | Age: 84
Discharge: HOME | End: 2019-06-30
Payer: MEDICARE

## 2019-06-28 DIAGNOSIS — R60.0 BILATERAL LEG EDEMA: ICD-10-CM

## 2019-06-28 PROCEDURE — 93970 EXTREMITY STUDY: CPT

## 2019-07-03 ENCOUNTER — OFFICE VISIT (OUTPATIENT)
Dept: OPTOMETRY | Age: 84
End: 2019-07-03
Payer: MEDICARE

## 2019-07-03 DIAGNOSIS — H25.13 NUCLEAR SCLEROSIS OF BOTH EYES: ICD-10-CM

## 2019-07-03 DIAGNOSIS — H53.8 BLURRED VISION, BILATERAL: Primary | ICD-10-CM

## 2019-07-03 PROCEDURE — 92250 FUNDUS PHOTOGRAPHY W/I&R: CPT | Performed by: OPTOMETRIST

## 2019-07-03 PROCEDURE — G8417 CALC BMI ABV UP PARAM F/U: HCPCS | Performed by: OPTOMETRIST

## 2019-07-03 PROCEDURE — 1036F TOBACCO NON-USER: CPT | Performed by: OPTOMETRIST

## 2019-07-03 PROCEDURE — G8598 ASA/ANTIPLAT THER USED: HCPCS | Performed by: OPTOMETRIST

## 2019-07-03 PROCEDURE — 99212 OFFICE O/P EST SF 10 MIN: CPT | Performed by: OPTOMETRIST

## 2019-07-03 PROCEDURE — G8427 DOCREV CUR MEDS BY ELIG CLIN: HCPCS | Performed by: OPTOMETRIST

## 2019-07-03 PROCEDURE — 1123F ACP DISCUSS/DSCN MKR DOCD: CPT | Performed by: OPTOMETRIST

## 2019-07-03 PROCEDURE — 4040F PNEUMOC VAC/ADMIN/RCVD: CPT | Performed by: OPTOMETRIST

## 2019-07-03 ASSESSMENT — VISUAL ACUITY
OD_CC: 20/40 OU
METHOD: SNELLEN - LINEAR
CORRECTION_TYPE: GLASSES
OS_CC: 20/40
OS_CC+: -1

## 2019-07-03 ASSESSMENT — REFRACTION_WEARINGRX
OS_CYLINDER: -1.25
SPECS_TYPE: BIFOCAL
OD_ADD: +2.50
OD_CYLINDER: -1.00
OS_SPHERE: +3.25
OS_AXIS: 090
OD_SPHERE: +2.75
OD_AXIS: 086
OS_ADD: +2.50

## 2019-07-03 ASSESSMENT — TONOMETRY
OD_IOP_MMHG: 24
IOP_METHOD: NON-CONTACT AIR PUFF
OS_IOP_MMHG: 22

## 2019-07-03 ASSESSMENT — KERATOMETRY
OS_K2POWER_DIOPTERS: 46.75
OD_AXISANGLE_DEGREES: 178
OS_K1POWER_DIOPTERS: 45.50
OD_K2POWER_DIOPTERS: 45.25
OD_AXISANGLE2_DEGREES: 088
OS_AXISANGLE2_DEGREES: 088
OS_AXISANGLE_DEGREES: 178
OD_K1POWER_DIOPTERS: 44.00

## 2019-07-03 ASSESSMENT — REFRACTION_MANIFEST: OD_SPHERE: NO IMPROVEMENT

## 2019-07-03 ASSESSMENT — SLIT LAMP EXAM - LIDS
COMMENTS: 2+ DERMATOCHALASIS - UPPER LID
COMMENTS: 2+ DERMATOCHALASIS - UPPER LID

## 2019-07-03 NOTE — PROGRESS NOTES
Medical: None     Non-medical: None   Tobacco Use    Smoking status: Former Smoker     Packs/day: 0.50     Types: Cigarettes     Last attempt to quit: 12/10/2004     Years since quittin.5    Smokeless tobacco: Never Used    Tobacco comment: Manfred Tolentino RRT 10/22/17   Substance and Sexual Activity    Alcohol use: Yes     Comment: occassional    Drug use: No    Sexual activity: None   Lifestyle    Physical activity:     Days per week: None     Minutes per session: None    Stress: None   Relationships    Social connections:     Talks on phone: None     Gets together: None     Attends Religion service: None     Active member of club or organization: None     Attends meetings of clubs or organizations: None     Relationship status: None    Intimate partner violence:     Fear of current or ex partner: None     Emotionally abused: None     Physically abused: None     Forced sexual activity: None   Other Topics Concern    None   Social History Narrative    None     Past Medical History:   Diagnosis Date    Aneurysm of infrarenal abdominal aorta ([facility] [address]     Atrial fibrillation ([facility] [address] 2014    Cardioversion successful    BPH (benign prostatic hyperplasia)     CAD (coronary artery disease)     Cerebrovascular disease     DVT (deep venous thrombosis) ([facility] [address] 1960's    left leg    Elevated PSA     Hyperlipidemia     Hyperopia with astigmatism and presbyopia     Hypertension            Main Ophthalmology Exam     External Exam       Right Left    External Normal Normal          Slit Lamp Exam       Right Left    Lids/Lashes 2+ Dermatochalasis - upper lid 2+ Dermatochalasis - upper lid    Lens 3+ Nuclear sclerosis, 2+ Posterior subcapsular cataract 3+ Nuclear sclerosis          Fundus Exam       Right Left    Disc Normal Normal    C/D Ratio 0.25 0.25    Macula poor view  some mottling noted                    Tonometry     Tonometry (Non-contact air puff, 9:16 AM)       Right Left    Pressure 24 22   IOPg:

## 2019-07-11 ENCOUNTER — OFFICE VISIT (OUTPATIENT)
Dept: FAMILY MEDICINE CLINIC | Age: 84
End: 2019-07-11
Payer: MEDICARE

## 2019-07-11 VITALS
DIASTOLIC BLOOD PRESSURE: 78 MMHG | WEIGHT: 232 LBS | HEART RATE: 72 BPM | BODY MASS INDEX: 33.21 KG/M2 | SYSTOLIC BLOOD PRESSURE: 160 MMHG | HEIGHT: 70 IN

## 2019-07-11 DIAGNOSIS — I50.32 CHRONIC DIASTOLIC CHF (CONGESTIVE HEART FAILURE) (HCC): ICD-10-CM

## 2019-07-11 DIAGNOSIS — N28.9 RENAL INSUFFICIENCY: ICD-10-CM

## 2019-07-11 DIAGNOSIS — I10 ESSENTIAL HYPERTENSION: Primary | ICD-10-CM

## 2019-07-11 DIAGNOSIS — R60.9 PERIPHERAL EDEMA: ICD-10-CM

## 2019-07-11 PROCEDURE — G8417 CALC BMI ABV UP PARAM F/U: HCPCS | Performed by: FAMILY MEDICINE

## 2019-07-11 PROCEDURE — 1036F TOBACCO NON-USER: CPT | Performed by: FAMILY MEDICINE

## 2019-07-11 PROCEDURE — 99214 OFFICE O/P EST MOD 30 MIN: CPT | Performed by: FAMILY MEDICINE

## 2019-07-11 PROCEDURE — 4040F PNEUMOC VAC/ADMIN/RCVD: CPT | Performed by: FAMILY MEDICINE

## 2019-07-11 PROCEDURE — G8427 DOCREV CUR MEDS BY ELIG CLIN: HCPCS | Performed by: FAMILY MEDICINE

## 2019-07-11 PROCEDURE — 1123F ACP DISCUSS/DSCN MKR DOCD: CPT | Performed by: FAMILY MEDICINE

## 2019-07-11 PROCEDURE — G8598 ASA/ANTIPLAT THER USED: HCPCS | Performed by: FAMILY MEDICINE

## 2019-07-11 RX ORDER — LISINOPRIL 30 MG/1
30 TABLET ORAL DAILY
Qty: 90 TABLET | Refills: 3 | Status: SHIPPED | OUTPATIENT
Start: 2019-07-11 | End: 2020-06-17

## 2019-07-11 RX ORDER — METOPROLOL SUCCINATE 50 MG/1
50 TABLET, EXTENDED RELEASE ORAL DAILY
Qty: 90 TABLET | Refills: 3
Start: 2019-07-11 | End: 2019-08-12 | Stop reason: SDUPTHER

## 2019-07-11 ASSESSMENT — ENCOUNTER SYMPTOMS
SHORTNESS OF BREATH: 0
ALLERGIC/IMMUNOLOGIC NEGATIVE: 1
CHEST TIGHTNESS: 0
GASTROINTESTINAL NEGATIVE: 1
RESPIRATORY NEGATIVE: 1
EYES NEGATIVE: 1

## 2019-07-11 ASSESSMENT — PATIENT HEALTH QUESTIONNAIRE - PHQ9
SUM OF ALL RESPONSES TO PHQ9 QUESTIONS 1 & 2: 0
2. FEELING DOWN, DEPRESSED OR HOPELESS: 0
SUM OF ALL RESPONSES TO PHQ QUESTIONS 1-9: 0
SUM OF ALL RESPONSES TO PHQ QUESTIONS 1-9: 0
1. LITTLE INTEREST OR PLEASURE IN DOING THINGS: 0

## 2019-07-11 NOTE — PROGRESS NOTES
Patient Self-Management Goal for Health Maintenance  Goal: I will schedule a yearly preventative care visit.   Barriers: none  Plan for overcoming my barriers: N/A  Confidence: 10/10  Anticipated Goal Completion Date:  07/11/19
(/2) TABLET NIGHTLY Yes Lida Gleason MD   aspirin 81 MG tablet Take 81 mg by mouth daily. Yes Historical Provider, MD   amiodarone (CORDARONE) 200 MG tablet Take 1 tablet by mouth daily. Jenifer Saucedo MD        Social History     Tobacco Use    Smoking status: Former Smoker     Packs/day: 0.50     Types: Cigarettes     Last attempt to quit: 12/10/2004     Years since quittin.5    Smokeless tobacco: Never Used    Tobacco comment: Kellie Perez RRT 10/22/17   Substance Use Topics    Alcohol use: Yes     Comment: occassional        Vitals:    19 1302 19 1306   BP: (!) 162/80 (!) 160/78   Site: Right Upper Arm Right Upper Arm   Position: Sitting Sitting   Cuff Size: Large Adult Large Adult   Pulse: 72    Weight: 232 lb (105.2 kg)    Height: 5' 10\" (1.778 m)      Estimated body mass index is 33.29 kg/m² as calculated from the following:    Height as of this encounter: 5' 10\" (1.778 m). Weight as of this encounter: 232 lb (105.2 kg). Physical Exam   Constitutional: He appears well-developed and well-nourished. HENT:   Head: Normocephalic. Eyes: Pupils are equal, round, and reactive to light. Neck: Normal range of motion. No thyromegaly present. Cardiovascular: Normal rate and regular rhythm. Pulmonary/Chest: Effort normal. No respiratory distress. Abdominal: Soft. He exhibits no distension. Musculoskeletal: Normal range of motion. He exhibits edema (left leg with tense edema). Right lower leg: He exhibits swelling and edema. He exhibits no tenderness. BP (!) 160/78 (Site: Right Upper Arm, Position: Sitting, Cuff Size: Large Adult)   Pulse 72   Ht 5' 10\" (1.778 m)   Wt 232 lb (105.2 kg)   BMI 33.29 kg/m²   Down 4 lbs over interval 2 weeks. ASSESSMENT/PLAN:  Encounter Diagnoses   Name Primary?     Peripheral edema     Essential hypertension Yes    Chronic diastolic CHF (congestive heart failure) (HCC)     Renal insufficiency      Making some progress on

## 2019-07-15 ENCOUNTER — OFFICE VISIT (OUTPATIENT)
Dept: OPHTHALMOLOGY | Age: 84
End: 2019-07-15
Payer: MEDICARE

## 2019-07-15 DIAGNOSIS — H02.834 DERMATOCHALASIS OF BOTH UPPER EYELIDS: ICD-10-CM

## 2019-07-15 DIAGNOSIS — H35.3131 EARLY DRY STAGE NONEXUDATIVE AGE-RELATED MACULAR DEGENERATION OF BOTH EYES: ICD-10-CM

## 2019-07-15 DIAGNOSIS — H02.831 DERMATOCHALASIS OF BOTH UPPER EYELIDS: ICD-10-CM

## 2019-07-15 DIAGNOSIS — H25.813 COMBINED FORMS OF AGE-RELATED CATARACT OF BOTH EYES: Primary | ICD-10-CM

## 2019-07-15 DIAGNOSIS — H43.813 DEGENERATION OF POSTERIOR VITREOUS BODY OF BOTH EYES: ICD-10-CM

## 2019-07-15 DIAGNOSIS — H35.033 HYPERTENSIVE RETINOPATHY OF BOTH EYES: ICD-10-CM

## 2019-07-15 PROCEDURE — 99214 OFFICE O/P EST MOD 30 MIN: CPT | Performed by: OPHTHALMOLOGY

## 2019-07-15 RX ORDER — PHENYLEPHRINE HYDROCHLORIDE 100 MG/ML
1 SOLUTION/ DROPS OPHTHALMIC ONCE
Status: COMPLETED | OUTPATIENT
Start: 2019-07-15 | End: 2019-07-15

## 2019-07-15 RX ORDER — CYCLOPENTOLATE HYDROCHLORIDE 10 MG/ML
1 SOLUTION/ DROPS OPHTHALMIC ONCE
Status: COMPLETED | OUTPATIENT
Start: 2019-07-15 | End: 2019-07-15

## 2019-07-15 RX ADMIN — PHENYLEPHRINE HYDROCHLORIDE 1 DROP: 100 SOLUTION/ DROPS OPHTHALMIC at 16:05

## 2019-07-15 RX ADMIN — CYCLOPENTOLATE HYDROCHLORIDE 1 DROP: 10 SOLUTION/ DROPS OPHTHALMIC at 16:05

## 2019-07-15 ASSESSMENT — TONOMETRY
IOP_METHOD: NON-CONTACT AIR PUFF
OS_IOP_MMHG: 17
OD_IOP_MMHG: 20

## 2019-07-15 ASSESSMENT — ENCOUNTER SYMPTOMS
RESPIRATORY NEGATIVE: 0
GASTROINTESTINAL NEGATIVE: 0
ALLERGIC/IMMUNOLOGIC NEGATIVE: 0
EYES NEGATIVE: 0

## 2019-07-15 ASSESSMENT — VISUAL ACUITY
CORRECTION_TYPE: GLASSES
OS_CC: 20/40
OS_PH_CC: 20/40
OD_PH_CC: 20/50
METHOD: SNELLEN - LINEAR

## 2019-07-15 ASSESSMENT — CONF VISUAL FIELD
OD_NORMAL: 1
OS_NORMAL: 1

## 2019-07-15 ASSESSMENT — SLIT LAMP EXAM - LIDS
COMMENTS: MILD BLEPHARITIS. MILD MGD, 2+ DERMATOCHALASIS - UPPER LID
COMMENTS: MILD BLEPHARITIS. MILD MGD, 2+ DERMATOCHALASIS - UPPER LID

## 2019-07-15 NOTE — PROGRESS NOTES
Myra Foley carisa 80 y.o. male here for a complete eye exam.      Chief Complaint   Patient presents with    Blurred Vision    Cataract       HPI     Blurred Vision     In both eyes. Cataract     In both eyes. Comments     Patient is here today to establish care, patient was referred by Dr Vero Renteria for a cataract evaluation . Last complete exam:07/19  Last glasses rx:2018  Cataract sx:    Pt states that both eyes are blurry for several months with no improvement with eyeglasses. Review of Systems  ROS     Positive for: HENT    Negative for: Constitutional, Gastrointestinal, Neurological, Skin, Genitourinary, Musculoskeletal, Endocrine, Cardiovascular, Eyes, Respiratory, Psychiatric, Allergic/Imm, Heme/Lymph          Main Ophthalmology Exam     Slit Lamp Exam       Right Left    Lids/Lashes Mild Blepharitis. Mild MGD, 2+ Dermatochalasis - upper lid Mild Blepharitis.  Mild MGD, 2+ Dermatochalasis - upper lid    Conjunctiva/Sclera Clear and white Clear and white    Cornea Clear Clear    Anterior Chamber Deep, no cells, no flare Deep, no cells, no flare    Iris Round and reactive Round and reactive    Lens 3+ Nuclear sclerosis, 2+ Cortical cataract 3+ Nuclear sclerosis, 1+ Cortical cataract          Fundus Exam       Right Left    Vitreous PVD PVD    Disc No edema and no pallor No edema and no pallor    C/D Ratio Vertical 0.55 0.55    C/D Ratio Horizontal 0.5 0.5    Macula Age related macular degeneration Age related macular degeneration    Vessels AV nicking AV nicking    Periphery No breaks, tears, or detachments No breaks, tears, or detachments                   Tonometry     Tonometry (Non-contact air puff, 3:39 PM)       Right Left    Pressure 20 17              Visual Acuity     Visual Acuity (Snellen - Linear)       Right Left    Dist cc 20/50 20/40    Dist ph cc 20/50 20/40    Correction:  Glasses               Not recorded         Confrontational Visual Fields Visual Fields       Right Left     Full Full               Extraocular Movement     Extraocular Movement       Right Left     Full, Ortho Full, Ortho               Not recorded          Past Medical History:   Diagnosis Date    Aneurysm of infrarenal abdominal aorta (HCC)     Atrial fibrillation ([address] 11/25/2014    Cardioversion successful    BPH (benign prostatic hyperplasia)     CAD (coronary artery disease)     Cerebrovascular disease     DVT (deep venous thrombosis) (HCC) 1960's    left leg    Elevated PSA     Hyperlipidemia     Hyperopia with astigmatism and presbyopia     Hypertension     Peripheral edema           Current Outpatient Medications:     metoprolol succinate (TOPROL XL) 50 MG extended release tablet, Take 1 tablet by mouth daily, Disp: 90 tablet, Rfl: 3    lisinopril (PRINIVIL;ZESTRIL) 30 MG tablet, Take 1 tablet by mouth daily, Disp: 90 tablet, Rfl: 3    bumetanide (BUMEX) 1 MG tablet, Take 1 tablet by mouth daily, Disp: 30 tablet, Rfl: 3    tamsulosin (FLOMAX) 0.4 MG capsule, TAKE 1 CAPSULE DAILY (MUST HAVE APPOINTMENT WITH DR. Melisa Velasquez FOR ANY ADDITIONAL REFILLS), Disp: 90 capsule, Rfl: 3    bethanechol (URECHOLINE) 25 MG tablet, Take 1 tablet by mouth every 6 hours, Disp: 120 tablet, Rfl: 3    simvastatin (ZOCOR) 20 MG tablet, TAKE 1 TABLET NIGHTLY (PLEASE SCHEDULE AN APPOINTMENT WITH DR. Melisa Velasquez FOR ANY ADDITIONAL REFILLS), Disp: 90 tablet, Rfl: 0    apixaban (ELIQUIS) 2.5 MG TABS tablet, Take 1 tablet by mouth 2 times daily, Disp: 28 tablet, Rfl: 0    aspirin 81 MG tablet, Take 81 mg by mouth daily. , Disp: , Rfl:      No Known Allergies     IMPRESSION:  1. Combined forms of age-related cataract of both eyes    2. Early dry stage nonexudative age-related macular degeneration of both eyes    3. Hypertensive retinopathy of both eyes    4. 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 Dermatochalasis of both upper eyelids        PLAN:    1.  Combined forms of

## 2019-07-16 ENCOUNTER — OFFICE VISIT (OUTPATIENT)
Dept: OPHTHALMOLOGY | Age: 84
End: 2019-07-16
Payer: MEDICARE

## 2019-07-16 ENCOUNTER — TELEPHONE (OUTPATIENT)
Dept: FAMILY MEDICINE CLINIC | Age: 84
End: 2019-07-16

## 2019-07-16 ENCOUNTER — TELEPHONE (OUTPATIENT)
Dept: OPHTHALMOLOGY | Age: 84
End: 2019-07-16

## 2019-07-16 DIAGNOSIS — H25.813 COMBINED FORMS OF AGE-RELATED CATARACT OF BOTH EYES: ICD-10-CM

## 2019-07-16 PROCEDURE — 92136 OPHTHALMIC BIOMETRY: CPT | Performed by: OPHTHALMOLOGY

## 2019-07-16 RX ORDER — PREDNISOLONE ACETATE-GATIFLOXACIN-BROMFENAC .75; 10; 5 MG/ML; MG/ML; MG/ML
SUSPENSION/ DROPS OPHTHALMIC
Qty: 1 BOTTLE | Refills: 0 | Status: SHIPPED | OUTPATIENT
Start: 2019-07-16 | End: 2019-10-04 | Stop reason: ALTCHOICE

## 2019-07-16 RX ORDER — TIMOLOL MALEATE 5 MG/ML
SOLUTION/ DROPS OPHTHALMIC
Qty: 1 BOTTLE | Refills: 0 | Status: SHIPPED | OUTPATIENT
Start: 2019-07-16 | End: 2021-11-01

## 2019-07-16 NOTE — PROGRESS NOTES
Patient is here today for measurements for cataract surgery, patient is having surgery on right eye first.

## 2019-07-19 NOTE — TELEPHONE ENCOUNTER
I would be ok clearing him for cataract surgery. He is on eliquis and aspirin. Would let Dr. Lexa Monzon decide if he needs off of these for 5 days prior to surgery. If not a problem, I would cont. Them, but ok to stop if needed. Also note he is on flomax, increasing the risk of intra operative floppy iris syndrome.

## 2019-07-25 ENCOUNTER — ANESTHESIA EVENT (OUTPATIENT)
Dept: OPERATING ROOM | Age: 84
End: 2019-07-25
Payer: MEDICARE

## 2019-07-25 ENCOUNTER — ANESTHESIA (OUTPATIENT)
Dept: OPERATING ROOM | Age: 84
End: 2019-07-25
Payer: MEDICARE

## 2019-07-25 ENCOUNTER — HOSPITAL ENCOUNTER (OUTPATIENT)
Age: 84
Setting detail: OUTPATIENT SURGERY
Discharge: HOME | End: 2019-07-25
Attending: OPHTHALMOLOGY | Admitting: OPHTHALMOLOGY
Payer: MEDICARE

## 2019-07-25 VITALS
RESPIRATION RATE: 18 BRPM | SYSTOLIC BLOOD PRESSURE: 166 MMHG | BODY MASS INDEX: 33.21 KG/M2 | OXYGEN SATURATION: 98 % | TEMPERATURE: 97.1 F | DIASTOLIC BLOOD PRESSURE: 90 MMHG | WEIGHT: 232 LBS | HEART RATE: 67 BPM | HEIGHT: 70 IN

## 2019-07-25 VITALS
OXYGEN SATURATION: 99 % | SYSTOLIC BLOOD PRESSURE: 233 MMHG | RESPIRATION RATE: 11 BRPM | DIASTOLIC BLOOD PRESSURE: 100 MMHG

## 2019-07-25 PROBLEM — Z98.41 POSTOPERATIVE CARE FOR CATARACT OF RIGHT EYE: Status: ACTIVE | Noted: 2019-07-25

## 2019-07-25 PROBLEM — Z48.810 POSTOPERATIVE CARE FOR CATARACT OF RIGHT EYE: Status: ACTIVE | Noted: 2019-07-25

## 2019-07-25 PROBLEM — H25.812 COMBINED FORMS OF AGE-RELATED CATARACT OF LEFT EYE: Status: ACTIVE | Noted: 2019-07-15

## 2019-07-25 PROCEDURE — 3600000012 HC SURGERY LEVEL 2 ADDTL 15MIN: Performed by: OPHTHALMOLOGY

## 2019-07-25 PROCEDURE — 3700000000 HC ANESTHESIA ATTENDED CARE: Performed by: OPHTHALMOLOGY

## 2019-07-25 PROCEDURE — 3600000002 HC SURGERY LEVEL 2 BASE: Performed by: OPHTHALMOLOGY

## 2019-07-25 PROCEDURE — 6370000000 HC RX 637 (ALT 250 FOR IP): Performed by: OPHTHALMOLOGY

## 2019-07-25 PROCEDURE — 2580000003 HC RX 258: Performed by: OPHTHALMOLOGY

## 2019-07-25 PROCEDURE — V2787 ASTIGMATISM-CORRECT FUNCTION: HCPCS | Performed by: OPHTHALMOLOGY

## 2019-07-25 PROCEDURE — 6360000002 HC RX W HCPCS: Performed by: OPHTHALMOLOGY

## 2019-07-25 PROCEDURE — 7100000010 HC PHASE II RECOVERY - FIRST 15 MIN: Performed by: OPHTHALMOLOGY

## 2019-07-25 PROCEDURE — 66982 XCAPSL CTRC RMVL CPLX WO ECP: CPT | Performed by: OPHTHALMOLOGY

## 2019-07-25 PROCEDURE — 2500000003 HC RX 250 WO HCPCS: Performed by: NURSE ANESTHETIST, CERTIFIED REGISTERED

## 2019-07-25 PROCEDURE — 6360000002 HC RX W HCPCS: Performed by: NURSE ANESTHETIST, CERTIFIED REGISTERED

## 2019-07-25 PROCEDURE — 2500000003 HC RX 250 WO HCPCS: Performed by: OPHTHALMOLOGY

## 2019-07-25 PROCEDURE — 3700000001 HC ADD 15 MINUTES (ANESTHESIA): Performed by: OPHTHALMOLOGY

## 2019-07-25 PROCEDURE — 2709999900 HC NON-CHARGEABLE SUPPLY: Performed by: OPHTHALMOLOGY

## 2019-07-25 DEVICE — IMPLANTABLE DEVICE: Type: IMPLANTABLE DEVICE | Site: EYE | Status: FUNCTIONAL

## 2019-07-25 RX ORDER — SODIUM CHLORIDE 0.9 % (FLUSH) 0.9 %
10 SYRINGE (ML) INJECTION PRN
Status: DISCONTINUED | OUTPATIENT
Start: 2019-07-25 | End: 2019-07-25 | Stop reason: HOSPADM

## 2019-07-25 RX ORDER — TROPICAMIDE - PROPARACINE - PHENYLEPHRINE - KETOROLAC 10; 5; 5; 25 MG/ML; MG/ML; MG/ML; MG/ML
1 SOLUTION/ DROPS OPHTHALMIC SEE ADMIN INSTRUCTIONS
Status: DISCONTINUED | OUTPATIENT
Start: 2019-07-25 | End: 2019-07-25 | Stop reason: HOSPADM

## 2019-07-25 RX ORDER — EPINEPHRINE 1 MG/ML
INJECTION, SOLUTION, CONCENTRATE INTRAVENOUS PRN
Status: DISCONTINUED | OUTPATIENT
Start: 2019-07-25 | End: 2019-07-25 | Stop reason: ALTCHOICE

## 2019-07-25 RX ORDER — LABETALOL HYDROCHLORIDE 5 MG/ML
INJECTION, SOLUTION INTRAVENOUS PRN
Status: DISCONTINUED | OUTPATIENT
Start: 2019-07-25 | End: 2019-07-25 | Stop reason: SDUPTHER

## 2019-07-25 RX ORDER — TROPICAMIDE - PROPARACINE - PHENYLEPHRINE - KETOROLAC 10; 5; 5; 25 MG/ML; MG/ML; MG/ML; MG/ML
SOLUTION/ DROPS OPHTHALMIC PRN
Status: DISCONTINUED | OUTPATIENT
Start: 2019-07-25 | End: 2019-07-25 | Stop reason: ALTCHOICE

## 2019-07-25 RX ORDER — DEXAMETHASONE PHOSPHATE - MOXIFLOXACIN - KETOROLAC TROMETHAMINE 1; .5; .4 MG/ML; MG/ML; MG/ML
INJECTION, SOLUTION OPHTHALMIC PRN
Status: DISCONTINUED | OUTPATIENT
Start: 2019-07-25 | End: 2019-07-25 | Stop reason: ALTCHOICE

## 2019-07-25 RX ORDER — HYDRALAZINE HYDROCHLORIDE 20 MG/ML
INJECTION INTRAMUSCULAR; INTRAVENOUS PRN
Status: DISCONTINUED | OUTPATIENT
Start: 2019-07-25 | End: 2019-07-25 | Stop reason: SDUPTHER

## 2019-07-25 RX ADMIN — LABETALOL HYDROCHLORIDE 5 MG: 5 INJECTION INTRAVENOUS at 16:53

## 2019-07-25 RX ADMIN — HYDRALAZINE HYDROCHLORIDE 10 MG: 20 INJECTION, SOLUTION INTRAMUSCULAR; INTRAVENOUS at 16:00

## 2019-07-25 RX ADMIN — LABETALOL HYDROCHLORIDE 5 MG: 5 INJECTION INTRAVENOUS at 16:55

## 2019-07-25 RX ADMIN — TROPICAMIDE - PROPARACINE - PHENYLEPHRINE - KETOROLAC 1 DROP: 10; 5; 5; 25 SOLUTION/ DROPS OPHTHALMIC at 15:08

## 2019-07-25 RX ADMIN — TROPICAMIDE - PROPARACINE - PHENYLEPHRINE - KETOROLAC 1 DROP: 10; 5; 5; 25 SOLUTION/ DROPS OPHTHALMIC at 14:57

## 2019-07-25 RX ADMIN — HYDRALAZINE HYDROCHLORIDE 10 MG: 20 INJECTION, SOLUTION INTRAMUSCULAR; INTRAVENOUS at 15:36

## 2019-07-25 RX ADMIN — Medication 10 ML: at 14:16

## 2019-07-25 ASSESSMENT — PULMONARY FUNCTION TESTS
PIF_VALUE: 1

## 2019-07-25 ASSESSMENT — PAIN SCALES - GENERAL
PAINLEVEL_OUTOF10: 0
PAINLEVEL_OUTOF10: 0

## 2019-07-25 ASSESSMENT — PAIN - FUNCTIONAL ASSESSMENT: PAIN_FUNCTIONAL_ASSESSMENT: 0-10

## 2019-07-25 ASSESSMENT — ENCOUNTER SYMPTOMS: SHORTNESS OF BREATH: 1

## 2019-07-25 NOTE — ANESTHESIA PRE PROCEDURE
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 [address] I48.91    Urinary retention with incomplete bladder emptying R33.9    Cellulitis of right lower extremity L03.115    Tobacco abuse Z72.0    HLD (hyperlipidemia) E78.5    Hyperglycemia R73.9    Renal insufficiency N28.9    Esophageal candidiasis ([facility]) B37.81    ARF (acute renal failure) ([facility]) N17.9    Hyperopia of both eyes with astigmatism and presbyopia H52.03, H52.203, H52.4    Atypical chest pain R07.89    Non-intractable vomiting with nausea R11.2    Chronic diastolic CHF (congestive heart failure) ([facility]) I50.32    Calculus of gallbladder with acute cholecystitis and obstruction K80.01    Peripheral edema R60.9    Combined forms of age-related cataract of both eyes H25.813    Early dry stage nonexudative age-related macular degeneration of both eyes H35.3131    Hypertensive retinopathy of both eyes H35.033    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    CBC:   Lab Results   Component Value Date    WBC 8.1 06/27/2019    RBC 5.16 06/27/2019    HGB 16.1 06/27/2019    HCT 48.6 06/27/2019    MCV 94.2 06/27/2019    RDW 14.9 06/27/2019     06/27/2019       CMP:   Lab Results   Component Value Date     06/27/2019    K 4.3 06/27/2019     06/27/2019    CO2 25 06/27/2019    BUN 19 06/27/2019    CREATININE 1.28 06/27/2019    GFRAA >60 06/27/2019    LABGLOM 53 06/27/2019    GLUCOSE 106 06/27/2019    PROT 7.8 06/27/2019    CALCIUM 9.6 06/27/2019    BILITOT 0.61 06/27/2019    ALKPHOS 88 06/27/2019    AST 18 06/27/2019    ALT 13 06/27/2019       POC Tests: No results for input(s): POCGLU, POCNA, POCK, POCCL, POCBUN, POCHEMO, POCHCT in the last 72 hours.     Coags:   Lab Results   Component Value Date    PROTIME 11.2 10/24/2017    INR 1.0 10/24/2017    APTT 24.3 12/29/2017       HCG (If Applicable): No results found for: PREGTESTUR, PREGSERUM, HCG, HCGQUANT     ABGs: No results found for: PHART, PO2ART, RGU4JMS, BBB8BJN, BEART, U8OIDJLA     Type & Screen (If Applicable):  No results found for: [facility]    Anesthesia Evaluation  Patient summary reviewed and Nursing notes reviewed no history of anesthetic complications:   Airway: Mallampati: II  TM distance: >3 FB   Neck ROM: full  Mouth opening: > = 3 FB Dental:          Pulmonary:normal exam    (+) shortness of breath:                             Cardiovascular:    (+) hypertension:, CAD:, CABG/stent:, dysrhythmias: atrial fibrillation, CHF:,                   Neuro/Psych:               GI/Hepatic/Renal:   (+) renal disease: CRI,           Endo/Other:                     Abdominal:           Vascular:                                        Anesthesia Plan      MAC

## 2019-07-25 NOTE — OP NOTE
Operative note   Rosina Chacko M.D. Cataract extraction with intraocular lens implant -RIGHT EYE    SURGEON: Yu Charles M.D. PREOPERATIVE DIAGNOSIS: Visually Significant Cataract of the RIGHT Eye    PROCEDURE: Cataract Extraction with Intraocular Lens Implantation RIGHT Eye                             Limited Anterior Vitrectomy RIGHT Eye    POSTOPERATIVE DIAGNOSIS: Visually Significant Cataract of the RIGHT Eye    TISSUE ALTERED: Lens of RIGHT Eye, Cornea of the RIGHT Eye    INTRAOCULAR LENS IMPLANTED:  Make: [place] [employer] [place]  Model: [id number]  Power: +20.00 D  Serial # [id number]    ANESTHESIA: Monitored Anesthesia Care with and Topical Anesthesia and Intracameral Anesthesia    ESTIMATED BLOOD LOSS: Minimal    COMPLICATIONS: None    SPECIMEN: None    SURGICAL INDICATION(S):The patient presented to the eye clinic with a complaint of decreased vision and glare of the RIGHT eye affecting activities of daily living such as reading and driving. The patient was examined and diagnosed with a Visually Significant Cataract of the RIGHT eye. The patient was counseled regarding the risks, benefits, and alternatives to cataract surgery. The patient was advised that cataract surgery may entail significant risks including but not limited to: cardiopulmonary failure, endophthalmitis, retinal detachment, dropped nucleus, iris prolapse, macular edema, need for additional surgery, and refractive surprise. The patient requested visual rehabilitation and provided informed consent for Cataract Extraction with Intraocular lens placement of the RIGHT eye. The patient  was further advised that due to his history of use of medications such as Tamsulosin and his history of congestive heart failure,   that there was an increased risk Intraoperative Floppy Iris Syndrome and compromise of the posterior capsule with loss of vitreous   as well as increased risk of Endophthalmitis, Macular Edema and the need for additional procedures.  The post-operative area in stable medical condition. The patient was provided with post-operative care instructions including instructions on using the eye medications and how to contact the eye care practice in case of an emergency and also including the date and time of the post-operative appointment at the eye clinic. A Malyugin Ring was placed to open the Iris prior to performing Capsulorrhexis             and the Malyugin Ring was removed from the eye prior to removing the OVD             after the limited anterior vitrectomy was performed.

## 2019-07-25 NOTE — H&P
edema      Essential hypertension Yes    Chronic diastolic CHF (congestive heart failure) (HCC)      Renal insufficiency        Making some progress on edema. Down 4 lbs in 2 weeks, he is diuresing. Right leg with continued, fairly tense edema. Discussed options. Plan to stop norvasc due to leg swelling and cont. Alternative drugs for hypertension.       Stop norvasc  Cont. bumex at current dosing. Increase toprol to 50mg/day, and increase lisinopril to 30mg/day  Consider jobst hose below the knee support      An electronic signature was used to authenticate this note.     --Olivia Charles MD on 7/11/2019 at 1:35 PM                   Pre-Operative Clearance  History and Physical Document from  Dr. Dain Dixon  reviewed by Dr. Maudie Barthel. Pt seen and examined  by Dr. Maudie Barthel in the Pre-Op area. No significant interval change from   most recent History and Physical  examination that is documented. Proceed with planned surgery.

## 2019-07-26 ENCOUNTER — OFFICE VISIT (OUTPATIENT)
Dept: OPHTHALMOLOGY | Age: 84
End: 2019-07-26

## 2019-07-26 DIAGNOSIS — Z98.41 POSTOPERATIVE CARE FOR CATARACT OF RIGHT EYE: Primary | ICD-10-CM

## 2019-07-26 DIAGNOSIS — Z48.810 POSTOPERATIVE CARE FOR CATARACT OF RIGHT EYE: Primary | ICD-10-CM

## 2019-07-26 PROCEDURE — 99024 POSTOP FOLLOW-UP VISIT: CPT | Performed by: OPHTHALMOLOGY

## 2019-07-26 RX ORDER — DORZOLAMIDE HCL 20 MG/ML
2 SOLUTION/ DROPS OPHTHALMIC 3 TIMES DAILY
Qty: 10 ML | Refills: 5 | Status: SHIPPED | OUTPATIENT
Start: 2019-07-26 | End: 2019-10-04 | Stop reason: ALTCHOICE

## 2019-07-26 RX ORDER — SIMVASTATIN 20 MG
TABLET ORAL
Qty: 90 TABLET | Refills: 0 | Status: SHIPPED | OUTPATIENT
Start: 2019-07-26 | End: 2019-10-24 | Stop reason: SDUPTHER

## 2019-07-26 ASSESSMENT — TONOMETRY
OD_IOP_MMHG: 28
IOP_METHOD: NON-CONTACT AIR PUFF
OS_IOP_MMHG: 22

## 2019-07-26 ASSESSMENT — SLIT LAMP EXAM - LIDS: COMMENTS: NO EDEMA, NO ERYTHEMA

## 2019-07-26 ASSESSMENT — VISUAL ACUITY
OD_PH_SC: 20/400
METHOD: SNELLEN - LINEAR
OD_SC: 20/400

## 2019-07-26 NOTE — TELEPHONE ENCOUNTER
Sarah Valdez called requesting a refill of the below medication which has been pended for you:     Requested Prescriptions     Pending Prescriptions Disp Refills    simvastatin (ZOCOR) 20 MG tablet [Pharmacy Med Name: SIMVASTATIN TABS 20MG] 90 tablet 0     Sig: TAKE 1 TABLET NIGHTLY (PLEASE SCHEDULE AN APPOINTMENT WITH DR Judith Adhikari FOR ANY ADDITIONAL REFILLS)       Last Appointment Date: 7/11/2019  Next Appointment Date: Visit date not found    No Known Allergies

## 2019-07-29 ENCOUNTER — OFFICE VISIT (OUTPATIENT)
Dept: OPHTHALMOLOGY | Age: 84
End: 2019-07-29

## 2019-07-29 DIAGNOSIS — T44.6X5A TAMSULOSIN-ASSOCIATED INTRAOPERATIVE FLOPPY IRIS SYNDROME (IFIS): ICD-10-CM

## 2019-07-29 DIAGNOSIS — Z98.41 POSTOPERATIVE CARE FOR CATARACT OF RIGHT EYE: Primary | ICD-10-CM

## 2019-07-29 DIAGNOSIS — Z48.810 POSTOPERATIVE CARE FOR CATARACT OF RIGHT EYE: Primary | ICD-10-CM

## 2019-07-29 DIAGNOSIS — H21.81 TAMSULOSIN-ASSOCIATED INTRAOPERATIVE FLOPPY IRIS SYNDROME (IFIS): ICD-10-CM

## 2019-07-29 PROCEDURE — 99024 POSTOP FOLLOW-UP VISIT: CPT | Performed by: OPHTHALMOLOGY

## 2019-07-29 RX ORDER — PREDNISOLONE ACETATE-BROMFENAC 10; .75 MG/ML; MG/ML
SUSPENSION/ DROPS OPHTHALMIC
Qty: 1 BOTTLE | Refills: 1 | Status: SHIPPED | OUTPATIENT
Start: 2019-07-29 | End: 2021-11-01

## 2019-07-29 ASSESSMENT — TONOMETRY
IOP_METHOD: NON-CONTACT AIR PUFF
OD_IOP_MMHG: 16
OS_IOP_MMHG: 16

## 2019-07-29 ASSESSMENT — VISUAL ACUITY
METHOD: SNELLEN - LINEAR
OS_PH_CC: 20/60

## 2019-07-29 ASSESSMENT — SLIT LAMP EXAM - LIDS: COMMENTS: NO EDEMA, NO ERYTHEMA

## 2019-07-29 NOTE — PROGRESS NOTES
Merlene Paz carisa 80 y.o. male here for a complete eye exam.      Chief Complaint   Patient presents with    Post-Op Check       HPI     Patient is being seen for a 6 day post op from having cataract surgery in right eye, he is on Imprimis Pred-Gati-Brom OD QID.   and  Dorzolamide OD TID, last drop this morning. Review of Systems      Main Ophthalmology Exam     Slit Lamp Exam       Right Left    Lids/Lashes No edema, no erythema     Conjunctiva/Sclera White and quiet     Cornea Three temporal 10-0 sutures. Trace edema central with mild edema peripheral.     Anterior Chamber Deep,, Trace Cell, no flare. Small amount of Vitreous in A/C.      Iris Reactive, mild correctopia     Lens Sulcus fixated PCIOL                    Tonometry     Tonometry (Non-contact air puff, 9:48 AM)       Right Left    Pressure 16 16              Visual Acuity     Visual Acuity (Snellen - Linear)       Right Left    Dist ph cc  20/60    Near sc PH 20/100+                Not recorded          Not recorded         Extraocular Movement     Extraocular Movement       Right Left     Full, Ortho Full, Ortho               Not recorded          Past Medical History:   Diagnosis Date    Aneurysm of infrarenal abdominal aorta (HCC)     Atrial fibrillation ([address] 11/25/2014    Cardioversion successful    BPH (benign prostatic hyperplasia)     CAD (coronary artery disease)     Cerebrovascular disease     DVT (deep venous thrombosis) (HCC) 1960's    left leg    Elevated PSA     Hyperlipidemia     Hyperopia with astigmatism and presbyopia     Hypertension     Peripheral edema           Current Outpatient Medications:     simvastatin (ZOCOR) 20 MG tablet, TAKE 1 TABLET NIGHTLY (PLEASE SCHEDULE AN APPOINTMENT WITH DR Eulalio Frost FOR ANY ADDITIONAL REFILLS), Disp: 90 tablet, Rfl: 0    dorzolamide (TRUSOPT) 2 % ophthalmic solution, Place 2 drops into the right eye 3 times daily, Disp: 10 mL, Rfl: 5    Prednisolon-Gatiflox-Bromfenac 1-0.5-0.075 % SUSP, Place 1 drop 4 times daily in surgery eye starting the day before surgery;Place 1 drop in surgery eye the morning of surgery, Disp: 1 Bottle, Rfl: 0    metoprolol succinate (TOPROL XL) 50 MG extended release tablet, Take 1 tablet by mouth daily, Disp: 90 tablet, Rfl: 3    lisinopril (PRINIVIL;ZESTRIL) 30 MG tablet, Take 1 tablet by mouth daily, Disp: 90 tablet, Rfl: 3    bumetanide (BUMEX) 1 MG tablet, Take 1 tablet by mouth daily, Disp: 30 tablet, Rfl: 3    tamsulosin (FLOMAX) 0.4 MG capsule, TAKE 1 CAPSULE DAILY (MUST HAVE APPOINTMENT WITH DR. Morgan Pena FOR ANY ADDITIONAL REFILLS), Disp: 90 capsule, Rfl: 3    bethanechol (URECHOLINE) 25 MG tablet, Take 1 tablet by mouth every 6 hours, Disp: 120 tablet, Rfl: 3    apixaban (ELIQUIS) 2.5 MG TABS tablet, Take 1 tablet by mouth 2 times daily, Disp: 28 tablet, Rfl: 0    aspirin 81 MG tablet, Take 81 mg by mouth daily. , Disp: , Rfl:     timolol (TIMOPTIC) 0.5 % ophthalmic solution, Place 1 drop in surgery eye the morning of surgery (Patient not taking: Reported on 7/29/2019), Disp: 1 Bottle, Rfl: 0     No Known Allergies     IMPRESSION:  1. Postoperative care for cataract of right eye    2. 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 Postoperative care for cataract of right eye    S/P CE IOL OD 25 Jul 2019 by Dr. Sanjay Simpson starting to clear. Visual acuity  is improving. Small amount of Vitreous  in A/C, however, main wound is Asael  Negative and not distorted. IOP OD   significantly improved.     Continue Imprimis Pred-Gati-Brom OD QID.     Continue Dorzolamide OD TID.     Follow. 2. 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     Electronically signed by Bianca Adhikari MD on 7/29/2019 at 9:56 AM

## 2019-08-01 ENCOUNTER — OFFICE VISIT (OUTPATIENT)
Dept: OPHTHALMOLOGY | Age: 84
End: 2019-08-01

## 2019-08-01 DIAGNOSIS — Z98.41 POSTOPERATIVE CARE FOR CATARACT OF RIGHT EYE: Primary | ICD-10-CM

## 2019-08-01 DIAGNOSIS — Z48.810 POSTOPERATIVE CARE FOR CATARACT OF RIGHT EYE: Primary | ICD-10-CM

## 2019-08-01 PROCEDURE — 99024 POSTOP FOLLOW-UP VISIT: CPT | Performed by: OPHTHALMOLOGY

## 2019-08-01 ASSESSMENT — SLIT LAMP EXAM - LIDS: COMMENTS: NO EDEMA, NO ERYTHEMA

## 2019-08-01 ASSESSMENT — TONOMETRY
IOP_METHOD: NON-CONTACT AIR PUFF
OS_IOP_MMHG: 15
OD_IOP_MMHG: 16

## 2019-08-01 ASSESSMENT — VISUAL ACUITY
METHOD: SNELLEN - LINEAR
OD_SC: 20/70
OD_PH_SC: 20/40
OS_PH_SC: 20/30

## 2019-08-01 ASSESSMENT — CONF VISUAL FIELD
OS_NORMAL: 1
OD_NORMAL: 1

## 2019-08-01 NOTE — PROGRESS NOTES
Sukumar Wei carisa 80 y.o. male here for a complete eye exam.      Chief Complaint   Patient presents with    Post-Op Check       HPI     Patient is being seen for a post op from cataract surgery in right eye on 07/24/19, he is on Imprimis Pred-Gati-Brom OD QID, last drop this morning and Dorzolamide OD TID, last drop this morning. Review of Systems      Main Ophthalmology Exam     Slit Lamp Exam       Right Left    Lids/Lashes No edema, no erythema     Conjunctiva/Sclera White and quiet     Cornea Three temporal 10-0 sutures. Trace edema central with mild edema peripheral.     Anterior Chamber Deep,, Trace Cell, no flare. Small amount of Vitreous in A/C.      Iris Reactive, mild correctopia     Lens Sulcus fixated PCIOL                    Tonometry     Tonometry (Non-contact air puff, 9:16 AM)       Right Left    Pressure 16 15              Visual Acuity     Visual Acuity (Snellen - Linear)       Right Left    Dist sc 20/70     Dist ph sc 20/40 20/30               Not recorded         Confrontational Visual Fields     Visual Fields       Right Left     Full Full               Extraocular Movement     Extraocular Movement       Right Left     Full, Ortho Full, Ortho               Not recorded          Past Medical History:   Diagnosis Date    Aneurysm of infrarenal abdominal aorta ([facility])     Atrial fibrillation ([address] 11/25/2014    Cardioversion successful    BPH (benign prostatic hyperplasia)     CAD (coronary artery disease)     Cerebrovascular disease     DVT (deep venous thrombosis) ([facility]) 1960's    left leg    Elevated PSA     Hyperlipidemia     Hyperopia with astigmatism and presbyopia     Hypertension     Peripheral edema           Current Outpatient Medications:     prednisoLONE-Bromfenac 1-0.075 % SUSP, Apply one drop in operated eye four times per day., Disp: 1 Bottle, Rfl: 1    simvastatin (ZOCOR) 20 MG tablet, TAKE 1 TABLET NIGHTLY (PLEASE SCHEDULE AN APPOINTMENT WITH DR Yasmeen Rivers FOR

## 2019-08-05 ENCOUNTER — OFFICE VISIT (OUTPATIENT)
Dept: OPHTHALMOLOGY | Age: 84
End: 2019-08-05

## 2019-08-05 DIAGNOSIS — Z98.41 POSTOPERATIVE CARE FOR CATARACT OF RIGHT EYE: Primary | ICD-10-CM

## 2019-08-05 DIAGNOSIS — Z48.810 POSTOPERATIVE CARE FOR CATARACT OF RIGHT EYE: Primary | ICD-10-CM

## 2019-08-05 PROCEDURE — 99024 POSTOP FOLLOW-UP VISIT: CPT | Performed by: OPHTHALMOLOGY

## 2019-08-05 ASSESSMENT — VISUAL ACUITY
METHOD: SNELLEN - LINEAR
OD_PH_SC: 20/50
OD_SC: 20/60
OS_PH_CC: 20/50

## 2019-08-05 ASSESSMENT — TONOMETRY
OD_IOP_MMHG: 27
OS_IOP_MMHG: 18
IOP_METHOD: NON-CONTACT AIR PUFF

## 2019-08-05 ASSESSMENT — SLIT LAMP EXAM - LIDS: COMMENTS: NO EDEMA, NO ERYTHEMA

## 2019-08-05 NOTE — PROGRESS NOTES
1 drop 4 times daily in surgery eye starting the day before surgery;Place 1 drop in surgery eye the morning of surgery, Disp: 1 Bottle, Rfl: 0    timolol (TIMOPTIC) 0.5 % ophthalmic solution, Place 1 drop in surgery eye the morning of surgery, Disp: 1 Bottle, Rfl: 0    metoprolol succinate (TOPROL XL) 50 MG extended release tablet, Take 1 tablet by mouth daily, Disp: 90 tablet, Rfl: 3    lisinopril (PRINIVIL;ZESTRIL) 30 MG tablet, Take 1 tablet by mouth daily, Disp: 90 tablet, Rfl: 3    bumetanide (BUMEX) 1 MG tablet, Take 1 tablet by mouth daily, Disp: 30 tablet, Rfl: 3    tamsulosin (FLOMAX) 0.4 MG capsule, TAKE 1 CAPSULE DAILY (MUST HAVE APPOINTMENT WITH DR. Alicia Galdamez FOR ANY ADDITIONAL REFILLS), Disp: 90 capsule, Rfl: 3    bethanechol (URECHOLINE) 25 MG tablet, Take 1 tablet by mouth every 6 hours, Disp: 120 tablet, Rfl: 3    apixaban (ELIQUIS) 2.5 MG TABS tablet, Take 1 tablet by mouth 2 times daily, Disp: 28 tablet, Rfl: 0    aspirin 81 MG tablet, Take 81 mg by mouth daily. , Disp: , Rfl:      No Known Allergies     IMPRESSION:  1. Postoperative care for cataract of right eye        PLAN:    1. Postoperative care for cataract of right eye    S/P CE IOL OD 25 Jul 2019 by Dr. Valentine Urbano. Pt had posterior capsular compromise requiring  a limited anterior vitrectomy. Va continues to improve. IOP mildly elevated OD. Single remaining 10-0 temporal suture  removed at slit lamp. Asael negative. One drop Ciloxan palced OD prior to  removing suture. Decrease Imprimis Pred-Brom to OD TID. Re-start Dorzolamide OD BID. DFE OD with OCT Mac OD at next visit.     Electronically signed by Jef Cates MD on 8/5/2019 at 11:10 AM

## 2019-08-08 ENCOUNTER — OFFICE VISIT (OUTPATIENT)
Dept: OPHTHALMOLOGY | Age: 84
End: 2019-08-08

## 2019-08-08 DIAGNOSIS — Z48.810 POSTOPERATIVE CARE FOR CATARACT OF RIGHT EYE: Primary | ICD-10-CM

## 2019-08-08 DIAGNOSIS — T44.6X5A TAMSULOSIN-ASSOCIATED INTRAOPERATIVE FLOPPY IRIS SYNDROME (IFIS): ICD-10-CM

## 2019-08-08 DIAGNOSIS — Z98.41 POSTOPERATIVE CARE FOR CATARACT OF RIGHT EYE: Primary | ICD-10-CM

## 2019-08-08 DIAGNOSIS — H21.81 TAMSULOSIN-ASSOCIATED INTRAOPERATIVE FLOPPY IRIS SYNDROME (IFIS): ICD-10-CM

## 2019-08-08 PROCEDURE — 99024 POSTOP FOLLOW-UP VISIT: CPT | Performed by: OPHTHALMOLOGY

## 2019-08-08 RX ORDER — CYCLOPENTOLATE HYDROCHLORIDE 10 MG/ML
1 SOLUTION/ DROPS OPHTHALMIC ONCE
Status: COMPLETED | OUTPATIENT
Start: 2019-08-08 | End: 2019-08-08

## 2019-08-08 RX ORDER — PHENYLEPHRINE HYDROCHLORIDE 100 MG/ML
1 SOLUTION/ DROPS OPHTHALMIC ONCE
Status: COMPLETED | OUTPATIENT
Start: 2019-08-08 | End: 2019-08-08

## 2019-08-08 RX ADMIN — PHENYLEPHRINE HYDROCHLORIDE 1 DROP: 100 SOLUTION/ DROPS OPHTHALMIC at 10:48

## 2019-08-08 RX ADMIN — CYCLOPENTOLATE HYDROCHLORIDE 1 DROP: 10 SOLUTION/ DROPS OPHTHALMIC at 10:47

## 2019-08-08 ASSESSMENT — VISUAL ACUITY
OD_SC: 20/40
OS_PH_CC: 20/30
OD_PH_SC: 20/40
METHOD: SNELLEN - LINEAR

## 2019-08-08 ASSESSMENT — TONOMETRY
IOP_METHOD: NON-CONTACT AIR PUFF
OD_IOP_MMHG: 19
OS_IOP_MMHG: 18

## 2019-08-08 ASSESSMENT — SLIT LAMP EXAM - LIDS: COMMENTS: MILD BLEPHARITIS, MILD MGD

## 2019-08-12 DIAGNOSIS — I10 ESSENTIAL HYPERTENSION: ICD-10-CM

## 2019-08-12 RX ORDER — METOPROLOL SUCCINATE 50 MG/1
50 TABLET, EXTENDED RELEASE ORAL DAILY
Qty: 90 TABLET | Refills: 3 | Status: SHIPPED | OUTPATIENT
Start: 2019-08-12 | End: 2020-07-22

## 2019-08-28 ENCOUNTER — OFFICE VISIT (OUTPATIENT)
Dept: OPTOMETRY | Age: 84
End: 2019-08-28

## 2019-08-28 ENCOUNTER — OFFICE VISIT (OUTPATIENT)
Dept: OPHTHALMOLOGY | Age: 84
End: 2019-08-28

## 2019-08-28 DIAGNOSIS — Z98.41 POSTOPERATIVE CARE FOR CATARACT OF RIGHT EYE: Primary | ICD-10-CM

## 2019-08-28 DIAGNOSIS — H35.371 EPIRETINAL MEMBRANE (ERM) OF RIGHT EYE: Primary | ICD-10-CM

## 2019-08-28 DIAGNOSIS — Z48.810 POSTOPERATIVE CARE FOR CATARACT OF RIGHT EYE: Primary | ICD-10-CM

## 2019-08-28 PROBLEM — H35.3132 INTERMEDIATE STAGE NONEXUDATIVE AGE-RELATED MACULAR DEGENERATION OF BOTH EYES: Status: ACTIVE | Noted: 2019-07-15

## 2019-08-28 PROCEDURE — 99024 POSTOP FOLLOW-UP VISIT: CPT | Performed by: OPHTHALMOLOGY

## 2019-08-28 PROCEDURE — 99024 POSTOP FOLLOW-UP VISIT: CPT | Performed by: OPTOMETRIST

## 2019-08-28 ASSESSMENT — REFRACTION_MANIFEST
OD_CYLINDER: -2.00
OD_SPHERE: +0.75
OD_AXIS: 070

## 2019-08-28 ASSESSMENT — TONOMETRY
OD_IOP_MMHG: 15
IOP_METHOD: NON-CONTACT AIR PUFF
OS_IOP_MMHG: 13
OD_IOP_MMHG: 19
IOP_METHOD: NON-CONTACT AIR PUFF

## 2019-08-28 ASSESSMENT — VISUAL ACUITY
OD_SC: 20/200
METHOD: SNELLEN - LINEAR
OS_SC: 20/100
METHOD: SNELLEN - LINEAR

## 2019-08-28 ASSESSMENT — SLIT LAMP EXAM - LIDS
COMMENTS: MILD BLEPHARITIS, MILD MGD
COMMENTS: NORMAL

## 2019-08-28 ASSESSMENT — CONF VISUAL FIELD: OD_NORMAL: 1

## 2019-09-09 ENCOUNTER — NURSE ONLY (OUTPATIENT)
Dept: LAB | Age: 84
End: 2019-09-09
Payer: MEDICARE

## 2019-09-09 DIAGNOSIS — Z23 NEED FOR VACCINATION: Primary | ICD-10-CM

## 2019-09-09 PROCEDURE — 90653 IIV ADJUVANT VACCINE IM: CPT | Performed by: FAMILY MEDICINE

## 2019-09-09 PROCEDURE — G0008 ADMIN INFLUENZA VIRUS VAC: HCPCS | Performed by: FAMILY MEDICINE

## 2019-09-20 ENCOUNTER — OFFICE VISIT (OUTPATIENT)
Dept: OPHTHALMOLOGY | Age: 84
End: 2019-09-20
Payer: MEDICARE

## 2019-09-20 DIAGNOSIS — H35.371 EPIRETINAL MEMBRANE (ERM) OF RIGHT EYE: Primary | ICD-10-CM

## 2019-09-20 PROCEDURE — G8598 ASA/ANTIPLAT THER USED: HCPCS | Performed by: OPHTHALMOLOGY

## 2019-09-20 PROCEDURE — 1036F TOBACCO NON-USER: CPT | Performed by: OPHTHALMOLOGY

## 2019-09-20 PROCEDURE — 1123F ACP DISCUSS/DSCN MKR DOCD: CPT | Performed by: OPHTHALMOLOGY

## 2019-09-20 PROCEDURE — 4040F PNEUMOC VAC/ADMIN/RCVD: CPT | Performed by: OPHTHALMOLOGY

## 2019-09-20 PROCEDURE — 99213 OFFICE O/P EST LOW 20 MIN: CPT | Performed by: OPHTHALMOLOGY

## 2019-09-20 PROCEDURE — G8427 DOCREV CUR MEDS BY ELIG CLIN: HCPCS | Performed by: OPHTHALMOLOGY

## 2019-09-20 PROCEDURE — G8417 CALC BMI ABV UP PARAM F/U: HCPCS | Performed by: OPHTHALMOLOGY

## 2019-09-20 RX ORDER — KETOROLAC TROMETHAMINE 5 MG/ML
1 SOLUTION OPHTHALMIC 2 TIMES DAILY
Qty: 1 BOTTLE | Refills: 5 | Status: SHIPPED | OUTPATIENT
Start: 2019-09-20 | End: 2019-10-20

## 2019-09-20 ASSESSMENT — VISUAL ACUITY
OD_PH_CC: 20/100
METHOD: SNELLEN - LINEAR
OS_PH_CC: 20/40
OS_CC: 20/40

## 2019-09-20 ASSESSMENT — TONOMETRY
OS_IOP_MMHG: 21
IOP_METHOD: NON-CONTACT AIR PUFF
OD_IOP_MMHG: 18

## 2019-09-20 ASSESSMENT — SLIT LAMP EXAM - LIDS: COMMENTS: MILD BLEPHARITIS, MILD MGD

## 2019-10-04 ENCOUNTER — OFFICE VISIT (OUTPATIENT)
Dept: FAMILY MEDICINE CLINIC | Age: 84
End: 2019-10-04
Payer: MEDICARE

## 2019-10-04 VITALS
HEART RATE: 60 BPM | RESPIRATION RATE: 18 BRPM | DIASTOLIC BLOOD PRESSURE: 92 MMHG | WEIGHT: 237.6 LBS | BODY MASS INDEX: 34.09 KG/M2 | SYSTOLIC BLOOD PRESSURE: 145 MMHG

## 2019-10-04 DIAGNOSIS — R60.9 PERIPHERAL EDEMA: ICD-10-CM

## 2019-10-04 DIAGNOSIS — E78.00 PURE HYPERCHOLESTEROLEMIA: ICD-10-CM

## 2019-10-04 DIAGNOSIS — I10 ESSENTIAL HYPERTENSION: Primary | ICD-10-CM

## 2019-10-04 DIAGNOSIS — I48.0 PAROXYSMAL ATRIAL FIBRILLATION (HCC): ICD-10-CM

## 2019-10-04 DIAGNOSIS — I25.10 CORONARY ARTERY DISEASE INVOLVING NATIVE CORONARY ARTERY OF NATIVE HEART WITHOUT ANGINA PECTORIS: ICD-10-CM

## 2019-10-04 DIAGNOSIS — R33.9 URINARY RETENTION WITH INCOMPLETE BLADDER EMPTYING: ICD-10-CM

## 2019-10-04 DIAGNOSIS — Z12.5 SCREENING PSA (PROSTATE SPECIFIC ANTIGEN): ICD-10-CM

## 2019-10-04 PROCEDURE — G8482 FLU IMMUNIZE ORDER/ADMIN: HCPCS | Performed by: FAMILY MEDICINE

## 2019-10-04 PROCEDURE — 99214 OFFICE O/P EST MOD 30 MIN: CPT | Performed by: FAMILY MEDICINE

## 2019-10-04 PROCEDURE — G8598 ASA/ANTIPLAT THER USED: HCPCS | Performed by: FAMILY MEDICINE

## 2019-10-04 PROCEDURE — 1036F TOBACCO NON-USER: CPT | Performed by: FAMILY MEDICINE

## 2019-10-04 PROCEDURE — G8427 DOCREV CUR MEDS BY ELIG CLIN: HCPCS | Performed by: FAMILY MEDICINE

## 2019-10-04 PROCEDURE — G8417 CALC BMI ABV UP PARAM F/U: HCPCS | Performed by: FAMILY MEDICINE

## 2019-10-04 PROCEDURE — 4040F PNEUMOC VAC/ADMIN/RCVD: CPT | Performed by: FAMILY MEDICINE

## 2019-10-04 PROCEDURE — 1123F ACP DISCUSS/DSCN MKR DOCD: CPT | Performed by: FAMILY MEDICINE

## 2019-10-04 RX ORDER — AMLODIPINE BESYLATE 5 MG/1
TABLET ORAL
COMMUNITY
Start: 2019-08-05 | End: 2019-10-04 | Stop reason: SINTOL

## 2019-10-04 ASSESSMENT — ENCOUNTER SYMPTOMS
GASTROINTESTINAL NEGATIVE: 1
ALLERGIC/IMMUNOLOGIC NEGATIVE: 1
CHEST TIGHTNESS: 0
EYES NEGATIVE: 1
SHORTNESS OF BREATH: 0
RESPIRATORY NEGATIVE: 1

## 2019-10-24 RX ORDER — SIMVASTATIN 20 MG
TABLET ORAL
Qty: 90 TABLET | Refills: 4 | Status: SHIPPED | OUTPATIENT
Start: 2019-10-24 | End: 2021-01-18

## 2019-10-28 ENCOUNTER — OFFICE VISIT (OUTPATIENT)
Dept: OPTOMETRY | Age: 84
End: 2019-10-28
Payer: MEDICARE

## 2019-10-28 DIAGNOSIS — H53.8 BLURRED VISION, BILATERAL: Primary | ICD-10-CM

## 2019-10-28 DIAGNOSIS — Z96.1 PSEUDOPHAKIA OF RIGHT EYE: ICD-10-CM

## 2019-10-28 DIAGNOSIS — H52.03 HYPEROPIA OF BOTH EYES WITH ASTIGMATISM AND PRESBYOPIA: ICD-10-CM

## 2019-10-28 DIAGNOSIS — H52.4 HYPEROPIA OF BOTH EYES WITH ASTIGMATISM AND PRESBYOPIA: ICD-10-CM

## 2019-10-28 DIAGNOSIS — H52.203 HYPEROPIA OF BOTH EYES WITH ASTIGMATISM AND PRESBYOPIA: ICD-10-CM

## 2019-10-28 PROCEDURE — G8427 DOCREV CUR MEDS BY ELIG CLIN: HCPCS | Performed by: OPTOMETRIST

## 2019-10-28 PROCEDURE — G8598 ASA/ANTIPLAT THER USED: HCPCS | Performed by: OPTOMETRIST

## 2019-10-28 PROCEDURE — 1036F TOBACCO NON-USER: CPT | Performed by: OPTOMETRIST

## 2019-10-28 PROCEDURE — G8482 FLU IMMUNIZE ORDER/ADMIN: HCPCS | Performed by: OPTOMETRIST

## 2019-10-28 PROCEDURE — 99213 OFFICE O/P EST LOW 20 MIN: CPT | Performed by: OPTOMETRIST

## 2019-10-28 PROCEDURE — 4040F PNEUMOC VAC/ADMIN/RCVD: CPT | Performed by: OPTOMETRIST

## 2019-10-28 PROCEDURE — G8417 CALC BMI ABV UP PARAM F/U: HCPCS | Performed by: OPTOMETRIST

## 2019-10-28 PROCEDURE — 1123F ACP DISCUSS/DSCN MKR DOCD: CPT | Performed by: OPTOMETRIST

## 2019-10-28 ASSESSMENT — REFRACTION_WEARINGRX
OD_AXIS: 070
OD_CYLINDER: -2.00
OD_SPHERE: +0.75

## 2019-10-28 ASSESSMENT — TONOMETRY
OS_IOP_MMHG: 20
IOP_METHOD: NON-CONTACT AIR PUFF
OD_IOP_MMHG: 19

## 2019-10-28 ASSESSMENT — ENCOUNTER SYMPTOMS
ALLERGIC/IMMUNOLOGIC NEGATIVE: 0
GASTROINTESTINAL NEGATIVE: 0
EYES NEGATIVE: 0
RESPIRATORY NEGATIVE: 0

## 2019-10-28 ASSESSMENT — SLIT LAMP EXAM - LIDS: COMMENTS: NORMAL

## 2019-10-28 ASSESSMENT — REFRACTION_MANIFEST
OS_AXIS: 077
OD_AXIS: 065
OS_CYLINDER: -1.00
OS_SPHERE: +2.75
OD_SPHERE: +1.00
OD_CYLINDER: -1.75
OD_ADD: +2.50
OS_ADD: +2.50

## 2019-10-28 ASSESSMENT — VISUAL ACUITY
METHOD: SNELLEN - LINEAR
OD_SC: 20/200 OU
OS_SC: 20/100
OD_SC: 20/60
METHOD_MR: 20/25-OU

## 2019-11-01 RX ORDER — BETHANECHOL CHLORIDE 25 MG/1
25 TABLET ORAL EVERY 6 HOURS
Qty: 120 TABLET | Refills: 0 | Status: SHIPPED | OUTPATIENT
Start: 2019-11-01 | End: 2020-11-03

## 2019-11-01 RX ORDER — BETHANECHOL CHLORIDE 25 MG/1
25 TABLET ORAL EVERY 6 HOURS
Qty: 360 TABLET | Refills: 3 | Status: SHIPPED | OUTPATIENT
Start: 2019-11-01 | End: 2019-11-01 | Stop reason: SDUPTHER

## 2020-02-03 RX ORDER — AMLODIPINE BESYLATE 5 MG/1
TABLET ORAL
Qty: 90 TABLET | Refills: 4 | Status: SHIPPED | OUTPATIENT
Start: 2020-02-03 | End: 2021-04-28

## 2020-02-03 RX ORDER — APIXABAN 2.5 MG/1
TABLET, FILM COATED ORAL
Qty: 180 TABLET | Refills: 4 | Status: SHIPPED | OUTPATIENT
Start: 2020-02-03 | End: 2021-04-28

## 2020-04-23 RX ORDER — TAMSULOSIN HYDROCHLORIDE 0.4 MG/1
0.4 CAPSULE ORAL DAILY
Qty: 90 CAPSULE | Refills: 0 | Status: SHIPPED | OUTPATIENT
Start: 2020-04-23 | End: 2020-07-22

## 2020-06-17 RX ORDER — LISINOPRIL 30 MG/1
TABLET ORAL
Qty: 90 TABLET | Refills: 3 | Status: SHIPPED | OUTPATIENT
Start: 2020-06-17 | End: 2021-06-14

## 2020-07-22 RX ORDER — METOPROLOL SUCCINATE 50 MG/1
TABLET, EXTENDED RELEASE ORAL
Qty: 90 TABLET | Refills: 3 | Status: SHIPPED | OUTPATIENT
Start: 2020-07-22 | End: 2021-07-19

## 2020-07-22 RX ORDER — TAMSULOSIN HYDROCHLORIDE 0.4 MG/1
CAPSULE ORAL
Qty: 90 CAPSULE | Refills: 3 | Status: SHIPPED | OUTPATIENT
Start: 2020-07-22 | End: 2021-07-19

## 2020-09-17 ENCOUNTER — IMMUNIZATION (OUTPATIENT)
Dept: LAB | Age: 85
End: 2020-09-17
Payer: MEDICARE

## 2020-09-17 PROCEDURE — 90694 VACC AIIV4 NO PRSRV 0.5ML IM: CPT | Performed by: FAMILY MEDICINE

## 2020-09-17 PROCEDURE — PBSHW INFLUENZA, QUADV, ADJUVANTED, 65 YRS +, IM, PF, PREFILL SYR, 0.5ML (FLUAD): Performed by: FAMILY MEDICINE

## 2020-11-03 RX ORDER — BETHANECHOL CHLORIDE 25 MG/1
TABLET ORAL
Qty: 360 TABLET | Refills: 3 | Status: SHIPPED | OUTPATIENT
Start: 2020-11-03 | End: 2021-10-29

## 2021-01-18 RX ORDER — SIMVASTATIN 20 MG
TABLET ORAL
Qty: 90 TABLET | Refills: 3 | Status: SHIPPED | OUTPATIENT
Start: 2021-01-18 | End: 2022-01-13

## 2021-02-05 ENCOUNTER — IMMUNIZATION (OUTPATIENT)
Dept: LAB | Age: 86
End: 2021-02-05
Payer: MEDICARE

## 2021-02-05 PROCEDURE — 91301 COVID-19, MODERNA VACCINE 100MCG/0.5ML DOSE: CPT

## 2021-03-05 ENCOUNTER — IMMUNIZATION (OUTPATIENT)
Dept: LAB | Age: 86
End: 2021-03-05
Payer: MEDICARE

## 2021-03-05 PROCEDURE — 0012A COVID-19, MODERNA VACCINE 100MCG/0.5ML DOSE: CPT

## 2021-04-28 RX ORDER — APIXABAN 2.5 MG/1
TABLET, FILM COATED ORAL
Qty: 180 TABLET | Refills: 3 | Status: SHIPPED | OUTPATIENT
Start: 2021-04-28 | End: 2022-04-25

## 2021-04-28 RX ORDER — AMLODIPINE BESYLATE 5 MG/1
TABLET ORAL
Qty: 90 TABLET | Refills: 3 | Status: SHIPPED | OUTPATIENT
Start: 2021-04-28 | End: 2022-04-25

## 2021-05-11 ENCOUNTER — TELEPHONE (OUTPATIENT)
Dept: FAMILY MEDICINE CLINIC | Age: 86
End: 2021-05-11

## 2021-06-13 DIAGNOSIS — I10 ESSENTIAL HYPERTENSION: ICD-10-CM

## 2021-06-14 RX ORDER — LISINOPRIL 30 MG/1
TABLET ORAL
Qty: 90 TABLET | Refills: 3 | Status: SHIPPED | OUTPATIENT
Start: 2021-06-14 | End: 2022-05-17

## 2021-07-19 DIAGNOSIS — N40.0 BENIGN PROSTATIC HYPERPLASIA, UNSPECIFIED WHETHER LOWER URINARY TRACT SYMPTOMS PRESENT: ICD-10-CM

## 2021-07-19 DIAGNOSIS — I10 ESSENTIAL HYPERTENSION: ICD-10-CM

## 2021-07-19 RX ORDER — TAMSULOSIN HYDROCHLORIDE 0.4 MG/1
CAPSULE ORAL
Qty: 90 CAPSULE | Refills: 0 | Status: SHIPPED | OUTPATIENT
Start: 2021-07-19 | End: 2021-10-15 | Stop reason: SDUPTHER

## 2021-07-19 RX ORDER — METOPROLOL SUCCINATE 50 MG/1
TABLET, EXTENDED RELEASE ORAL
Qty: 90 TABLET | Refills: 0 | Status: SHIPPED | OUTPATIENT
Start: 2021-07-19 | End: 2021-10-15 | Stop reason: SDUPTHER

## 2021-07-19 NOTE — TELEPHONE ENCOUNTER
Patient is due for an appointment. [facility] to schedule.       Maria Victoria Hayes called requesting a refill of the below medication which has been pended for you:     Requested Prescriptions     Pending Prescriptions Disp Refills    tamsulosin (FLOMAX) 0.4 MG capsule [Pharmacy Med Name: TAMSULOSIN HCL CAPS 0.4MG] 90 capsule 3     Sig: TAKE 1 CAPSULE DAILY    metoprolol succinate (TOPROL XL) 50 MG extended release tablet [Pharmacy Med Name: METOPROLOL SUCCINATE ER TABS 50MG] 90 tablet 3     Sig: TAKE 1 TABLET DAILY       Last Appointment Date: 10/4/2019  Next Appointment Date: Visit date not found    No Known Allergies

## 2021-10-15 DIAGNOSIS — I10 ESSENTIAL HYPERTENSION: ICD-10-CM

## 2021-10-15 DIAGNOSIS — N40.0 BENIGN PROSTATIC HYPERPLASIA, UNSPECIFIED WHETHER LOWER URINARY TRACT SYMPTOMS PRESENT: ICD-10-CM

## 2021-10-15 RX ORDER — METOPROLOL SUCCINATE 50 MG/1
TABLET, EXTENDED RELEASE ORAL
Qty: 90 TABLET | Refills: 0 | Status: SHIPPED | OUTPATIENT
Start: 2021-10-15 | End: 2022-01-13

## 2021-10-15 RX ORDER — TAMSULOSIN HYDROCHLORIDE 0.4 MG/1
CAPSULE ORAL
Qty: 90 CAPSULE | Refills: 0 | Status: SHIPPED | OUTPATIENT
Start: 2021-10-15 | End: 2022-01-13

## 2021-10-29 NOTE — TELEPHONE ENCOUNTER
Jason Fragoso called requesting a refill of the below medication which has been pended for you:     Requested Prescriptions     Pending Prescriptions Disp Refills    bethanechol (URECHOLINE) 25 MG tablet [Pharmacy Med Name: BETHANECHOL CHLOR TABS 25MG] 360 tablet 0     Sig: TAKE 1 TABLET EVERY 6 HOURS       Last Appointment Date: 10/4/2019  Next Appointment Date: Visit date not found    No Known Allergies

## 2021-11-01 ENCOUNTER — OFFICE VISIT (OUTPATIENT)
Dept: OPTOMETRY | Age: 86
End: 2021-11-01
Payer: COMMERCIAL

## 2021-11-01 DIAGNOSIS — H35.371 EPIRETINAL MEMBRANE (ERM) OF RIGHT EYE: ICD-10-CM

## 2021-11-01 DIAGNOSIS — H52.4 HYPEROPIA OF BOTH EYES WITH ASTIGMATISM AND PRESBYOPIA: Primary | ICD-10-CM

## 2021-11-01 DIAGNOSIS — H52.03 HYPEROPIA OF BOTH EYES WITH ASTIGMATISM AND PRESBYOPIA: Primary | ICD-10-CM

## 2021-11-01 DIAGNOSIS — Z96.1 PSEUDOPHAKIA OF RIGHT EYE: ICD-10-CM

## 2021-11-01 DIAGNOSIS — H25.812 COMBINED FORMS OF AGE-RELATED CATARACT OF LEFT EYE: ICD-10-CM

## 2021-11-01 DIAGNOSIS — H52.203 HYPEROPIA OF BOTH EYES WITH ASTIGMATISM AND PRESBYOPIA: Primary | ICD-10-CM

## 2021-11-01 PROBLEM — Z98.41 POSTOPERATIVE CARE FOR CATARACT OF RIGHT EYE: Status: RESOLVED | Noted: 2019-07-25 | Resolved: 2021-11-01

## 2021-11-01 PROBLEM — Z48.810 POSTOPERATIVE CARE FOR CATARACT OF RIGHT EYE: Status: RESOLVED | Noted: 2019-07-25 | Resolved: 2021-11-01

## 2021-11-01 PROCEDURE — G8421 BMI NOT CALCULATED: HCPCS | Performed by: OPTOMETRIST

## 2021-11-01 PROCEDURE — G8427 DOCREV CUR MEDS BY ELIG CLIN: HCPCS | Performed by: OPTOMETRIST

## 2021-11-01 PROCEDURE — 92014 COMPRE OPH EXAM EST PT 1/>: CPT | Performed by: OPTOMETRIST

## 2021-11-01 PROCEDURE — 1036F TOBACCO NON-USER: CPT | Performed by: OPTOMETRIST

## 2021-11-01 RX ORDER — BETHANECHOL CHLORIDE 25 MG/1
TABLET ORAL
Qty: 360 TABLET | Refills: 0 | Status: SHIPPED | OUTPATIENT
Start: 2021-11-01 | End: 2022-01-31

## 2021-11-01 ASSESSMENT — REFRACTION_WEARINGRX
OS_CYLINDER: -1.00
OD_CYLINDER: -1.75
SPECS_TYPE: BIFOCAL
OD_AXIS: 065
OS_SPHERE: +2.75
OS_ADD: +2.50
OS_AXIS: 077
OD_SPHERE: +1.00
OD_ADD: +2.50

## 2021-11-01 ASSESSMENT — KERATOMETRY
OD_K1POWER_DIOPTERS: 45.75
OS_K2POWER_DIOPTERS: 46.75
OD_K2POWER_DIOPTERS: 46.50
OD_AXISANGLE2_DEGREES: 062
METHOD_AUTO_MANUAL: AUTOMATED
OD_AXISANGLE_DEGREES: 152
OS_AXISANGLE_DEGREES: 013
OS_AXISANGLE2_DEGREES: 103
OS_K1POWER_DIOPTERS: 45.50

## 2021-11-01 ASSESSMENT — SLIT LAMP EXAM - LIDS: COMMENTS: ECTROPION

## 2021-11-01 ASSESSMENT — REFRACTION_MANIFEST
OD_AXIS: 100
OD_SPHERE: +1.00
OD_CYLINDER: -2.25
OS_AXIS: 090
OS_ADD: +2.75
OS_SPHERE: +2.75
OS_CYLINDER: -1.50
OD_ADD: +2.75

## 2021-11-01 ASSESSMENT — VISUAL ACUITY
OS_CC: 20/50
CORRECTION_TYPE: GLASSES
METHOD: SNELLEN - LINEAR
OD_CC: 20/40
OS_CC+: +2

## 2021-11-01 ASSESSMENT — TONOMETRY
OD_IOP_MMHG: 23
OS_IOP_MMHG: 25
IOP_METHOD: TONOPEN

## 2021-11-01 NOTE — PROGRESS NOTES
Tavo Carlisle presents today for   Chief Complaint   Patient presents with   [facility]   . HPI     Blurred Vision     In both eyes. Comments     Last Vision Exam:10/28/19   Last Ophthalmology Exam: 9/20/19 adriana ; cataract surgery right eye   Last Filled Glasses Rx: 10/28/19  Insurance: Karina Jimenes    Update: Update glasses, current glasses dont help much. He would love to get a pair of reading glasses for up close work, like fishing. Current Outpatient Medications   Medication Sig Dispense Refill    bethanechol (URECHOLINE) 25 MG tablet TAKE 1 TABLET EVERY 6 HOURS 360 tablet 0    metoprolol succinate (TOPROL XL) 50 MG extended release tablet TAKE 1 TABLET DAILY 90 tablet 0    tamsulosin (FLOMAX) 0.4 MG capsule TAKE 1 CAPSULE DAILY 90 capsule 0    lisinopril (PRINIVIL;ZESTRIL) 30 MG tablet TAKE 1 TABLET DAILY 90 tablet 3    amLODIPine (NORVASC) 5 MG tablet TAKE 1 TABLET DAILY 90 tablet 3    ELIQUIS 2.5 MG TABS tablet TAKE 1 TABLET TWICE A  tablet 3    simvastatin (ZOCOR) 20 MG tablet TAKE 1 TABLET NIGHTLY (PLEASE SCHEDULE AN APPOINTMENT WITH DR Khloe Dominguez FOR ANY ADDITIONAL REFILLS) 90 tablet 3    bumetanide (BUMEX) 1 MG tablet Take 1 tablet by mouth daily 30 tablet 3    aspirin 81 MG tablet Take 81 mg by mouth daily. Current Facility-Administered Medications   Medication Dose Route Frequency Provider Last Rate Last Admin    brimonidine (ALPHAGAN) 0.2 % ophthalmic solution 1 drop  1 drop Right Eye Once Benjamin Sarkar MD        ciprofloxacin (CILOXAN) 0.3 % ophthalmic solution 1 drop  1 drop Right Eye Once Benjamin Sarkar MD             Family History   Problem Relation Age of Onset    Cancer Sister         ?     Cancer Sister         lung     Glaucoma Neg Hx     Diabetes Neg Hx     Cataracts Neg Hx      Social History     Socioeconomic History    Marital status:      Spouse name: None    Number of children: None    Years of education: None    Highest education level: None   Occupational History    None   Tobacco Use    Smoking status: Former Smoker     Packs/day: 0.50     Types: Cigarettes     Quit date: 12/10/2004     Years since quittin.9    Smokeless tobacco: Never Used    Tobacco comment: Patricia Mota RRT 10/22/17   Substance and Sexual Activity    Alcohol use: Yes     Comment: occassional    Drug use: No    Sexual activity: None   Other Topics Concern    None   Social History Narrative    None     Social Determinants of Health     Financial Resource Strain:     Difficulty of Paying Living Expenses:    Food Insecurity:     Worried About Running Out of Food in the Last Year:     [address] in the Last Year:    Transportation Needs:     Lack of Transportation (Medical):      Lack of Transportation (Non-Medical):    Physical Activity:     Days of Exercise per Week:     Minutes of Exercise per Session:    Stress:     Feeling of Stress :    Social Connections:     Frequency of Communication with Friends and Family:     Frequency of Social Gatherings with Friends and Family:     Attends [employer] Services:     Active Member of Clubs or Organizations:     Attends Club or Organization Meetings:     Marital Status:    Intimate Partner Violence:     Fear of Current or Ex-Partner:     Emotionally Abused:     Physically Abused:     Sexually Abused:      Past Medical History:   Diagnosis Date    [facility]al [facility]     Atrial fibrillation ([facility] 2014    Cardioversion successful    BPH (benign prostatic hyperplasia)     CAD (coronary artery disease)     Cerebrovascular disease     DVT (deep venous thrombosis) ([facility] 1960's    left leg    Elevated PSA     Hyperlipidemia     Hyperopia with astigmatism and presbyopia     Hypertension     Peripheral edema     Postoperative care for cataract of right eye 2019           Main Ophthalmology Exam     External Exam       Right Left    External Normal Normal          Slit Lamp Exam       Right Left    Lids/Lashes Ectropion, 2+ Dermatochalasis - lower lid Ectropion    Conjunctiva/Sclera White and quiet White and quiet    Cornea Clear Clear    Anterior Chamber Deep and quiet Deep and quiet    Iris peaked pupil at 8 oclock ;  Round and reactive    Lens pciol  3+ Nuclear sclerosis          Fundus Exam       Right Left    Disc Normal     C/D Ratio 0.3 0.3    Macula ERM      Vessels Normal Normal                   Tonometry     Tonometry (Tonopen, 9:08 AM)       Right Left    Pressure 23 25      Right / Left  IOPg 20.3 / 24.6  CH 8.1 / 9.0  WS 5.5 / 3.3                    Not recorded         Not recorded          Visual Acuity (Snellen - Linear)       Right Left    Dist cc 20/200 20/50 +2    Near cc 20/40     Correction: Glasses           Not recorded         Not recorded        Keratometry     Keratometry (Automated)       K1 Axis K2 Axis    Right 45.75 062 46.50 152    Left 45.50 103 46.75 013                  Ophthalmology Exam     Wearing Rx       Sphere Cylinder Axis Add    Right +1.00 -1.75 065 +2.50    Left +2.75 -1.00 077 +2.50    Age: 3yrs    Type: Bifocal              Manifest Refraction     Manifest Refraction       Sphere Cylinder Kiln Dist [place] Add [place]    Right +1.00 -2.25 100 20/50 +2.75     Left +2.75 -1.50 090 20/50+ +2.75 20/40+ou          Manifest Refraction #2 (Auto)       Sphere Cylinder Kiln Dist VA Add [place]    Right +1.50 -3.25 102       Left +3.25 -1.75 090             Manifest Refraction Comments    20/40-ou                Final Rx       Sphere Cylinder Kiln Dist VA Add [place]    Right +1.00 -2.25 100 20/50 +2.75     Left +2.75 -1.50 090 20/50+ +2.75 20/40+ou      Final Rx #2       Sphere Cylinder Kiln Dist VA Add [place]    Right +4.00 -2.25 100       Left +5.75 -1.50 090       Type: RRX     Expiration Date: 11/2/2023      Final Rx Comments    Reduced working distance for the RRX            No orders of the defined types were placed in this encounter. IMPRESSION:  1. Hyperopia of both eyes with astigmatism and presbyopia    2. Epiretinal membrane (ERM) of right eye    3. Pseudophakia of right eye    4. Combined forms of age-related cataract of left eye        PLAN:    1.  new glasses; reading glasses and a bifocal were [id number]   2. Monitor in future with oct MAC  3. Peaked pupil resulting and retinal specialist seen on follow up   4. Monitor for future progression and visual significance. Counseled patient that more glare may be noticed and more light may be needed for reading. Patient Instructions   New glasses recommended; The vision is limited in the left eye because of the cataract; Reading glasses were also prescribed as wished.   Make sure to hold the upclose material slightly closer than usual as discussed      Return in about 1 year (around 11/1/2022) for complete eye exam.

## 2021-11-01 NOTE — PATIENT INSTRUCTIONS
New glasses recommended; The vision is limited in the left eye because of the cataract; Reading glasses were also prescribed as wished.   Make sure to hold the [facility] material slightly closer than usual as discussed

## 2021-12-03 NOTE — OP NOTE
[address] [facility]                                 OPERATIVE REPORT    PATIENT NAME: Erika Stahl                     :        1933  MED REC NO:   [id number]                             ROOM:  ACCOUNT NO:   [de-identified]                           ADMIT DATE: 2017  PROVIDER:     Johanna Roberts Ang    DATE OF PROCEDURE:  2017    PREOPERATIVE DIAGNOSES:  Cholelithiasis, cholecystitis. POSTOPERATIVE DIAGNOSES:  Cholelithiasis, cholecystitis. PROCEDURE:  Laparoscopic cholecystectomy. SURGEON:  Beatriz Epps MD    ASSISTANT:  Dr. Venus Richmond. ANESTHESIA:  General endotracheal with 0.5% Marcaine local infiltration. COMPLICATIONS:  None. SPECIMENS:  One of gallbladder and contents and stone. DRAINS:  None. INDICATIONS:  The patient is an 49-year-old white male admitted with  complaints of chest pain found to have cholelithiasis and cholecystitis. DESCRIPTION OF PROCEDURE:  After the abdomen was prepped and draped in the  usual sterile fashion, a 0.5 cm infraumbilical skin crease incision was  made and Veress needle was introduced into the abdominal cavity with 2  distinct clicks seen on the indicator. Position was then confirmed using  hanging drop test.  The abdomen was then insufflated until it was tautly  and diffusely tympanitic. The Veress needle was then withdrawn, and a 5-mm  trocar was introduced into the abdominal cavity and the laparoscope was  placed immediately through this and abdominal viscera were inspected and  found to be free of any injury and the patient was placed in steep reverse  Trendelenburg with a second 1 cm transverse incision was made in the upper  midline and a 11-mm trocar was passed through this under direct  visualization.   The gallbladder was found to be acutely inflamed and  distended and was decompressed of approximately 60 mL of purulent Modafinil Approved    Prior Authorization Number: [id number]  Dates of approval: 1/1/2021-12/31/2022  Filling [facility]: [facility]  Additional Information: Pharmacy contacted - received paid claim.  Pharmacy will contact patient when medication is ready to be picked up.    appearing  bile and then was able to be grasped. At this point, two 5-mm ports were  placed in the midclavicular and anterior axillary line respectively and a 5  mm transverse incision was made 4 cm below the costal margin under direct  visualization and the gallbladder was directed to fundus and at  infundibulum. The cystic duct and artery were then dissected out clearly  and simultaneously and each was done doubly clipped proximally and  transected. The gallbladder was then dissected from the liver bed using  blunt dissection and electrocautery for hemostasis until it was freed from  its distal attachments. Good hemostasis in the bed of the dissection. The  gallbladder was then retrieved using an Endopouch through the superior  midline incision and handed out for pathologic examination. The bed of the  dissection was then irrigated and aspirated with clear return and ports  were withdrawn under direct visualization. Midline fascia was  reapproximated using 0 Vicryl and skin was closed using subcuticular  sutures of 4-0 Monocryl and Steri-Strips were then placed after the wounds  were anesthetized using 0.5% Marcaine. The patient tolerated the procedure  well and was taken to recovery room in satisfactory condition. All sponge,  needle and instrument counts were reported correct at the end of the case.         Romana Flood    D: 12/30/2017 13:59:15       T: 12/30/2017 20:36:06     [email or web address]  Job#: [id number]     Doc#: [id number]

## 2021-12-20 ENCOUNTER — IMMUNIZATION (OUTPATIENT)
Dept: LAB | Age: 86
End: 2021-12-20
Payer: MEDICARE

## 2021-12-20 PROCEDURE — 90471 IMMUNIZATION ADMIN: CPT | Performed by: FAMILY MEDICINE

## 2021-12-20 PROCEDURE — PBSHW INFLUENZA, QUADV, ADJUVANTED, 65 YRS +, IM, PF, PREFILL SYR, 0.5ML (FLUAD): Performed by: FAMILY MEDICINE

## 2021-12-22 ENCOUNTER — IMMUNIZATION (OUTPATIENT)
Dept: LAB | Age: 86
End: 2021-12-22
Payer: MEDICARE

## 2021-12-22 PROCEDURE — PBSHW COVID-19, MODERNA BOOSTER VACCINE 0.25ML DOSE: Performed by: INTERNAL MEDICINE

## 2021-12-22 PROCEDURE — 91306 COVID-19, MODERNA BOOSTER VACCINE 0.25ML DOSE: CPT | Performed by: INTERNAL MEDICINE

## 2022-01-13 DIAGNOSIS — I10 ESSENTIAL HYPERTENSION: ICD-10-CM

## 2022-01-13 DIAGNOSIS — N40.0 BENIGN PROSTATIC HYPERPLASIA, UNSPECIFIED WHETHER LOWER URINARY TRACT SYMPTOMS PRESENT: ICD-10-CM

## 2022-01-13 RX ORDER — TAMSULOSIN HYDROCHLORIDE 0.4 MG/1
CAPSULE ORAL
Qty: 90 CAPSULE | Refills: 3 | Status: SHIPPED | OUTPATIENT
Start: 2022-01-13

## 2022-01-13 RX ORDER — METOPROLOL SUCCINATE 50 MG/1
TABLET, EXTENDED RELEASE ORAL
Qty: 90 TABLET | Refills: 3 | Status: SHIPPED | OUTPATIENT
Start: 2022-01-13

## 2022-01-13 RX ORDER — SIMVASTATIN 20 MG
TABLET ORAL
Qty: 90 TABLET | Refills: 3 | Status: SHIPPED | OUTPATIENT
Start: 2022-01-13

## 2022-01-31 RX ORDER — BETHANECHOL CHLORIDE 25 MG/1
TABLET ORAL
Qty: 360 TABLET | Refills: 3 | Status: SHIPPED | OUTPATIENT
Start: 2022-01-31

## 2022-04-25 RX ORDER — AMLODIPINE BESYLATE 5 MG/1
TABLET ORAL
Qty: 90 TABLET | Refills: 3 | Status: SHIPPED | OUTPATIENT
Start: 2022-04-25

## 2022-04-25 RX ORDER — APIXABAN 2.5 MG/1
TABLET, FILM COATED ORAL
Qty: 180 TABLET | Refills: 3 | Status: SHIPPED | OUTPATIENT
Start: 2022-04-25

## 2022-05-17 DIAGNOSIS — I10 ESSENTIAL HYPERTENSION: ICD-10-CM

## 2022-05-17 RX ORDER — LISINOPRIL 30 MG/1
TABLET ORAL
Qty: 90 TABLET | Refills: 3 | Status: SHIPPED | OUTPATIENT
Start: 2022-05-17

## 2022-11-15 ENCOUNTER — IMMUNIZATION (OUTPATIENT)
Dept: LAB | Age: 87
End: 2022-11-15
Payer: MEDICARE

## 2022-11-15 DIAGNOSIS — Z23 NEED FOR PNEUMOCOCCAL VACCINE: Primary | ICD-10-CM

## 2022-11-15 PROCEDURE — PBSHW INFLUENZA, FLUAD, (AGE 65 Y+), IM, PF, 0.5 ML: Performed by: FAMILY MEDICINE

## 2022-11-15 PROCEDURE — G0008 ADMIN INFLUENZA VIRUS VAC: HCPCS | Performed by: FAMILY MEDICINE

## 2022-11-15 PROCEDURE — G0009 ADMIN PNEUMOCOCCAL VACCINE: HCPCS

## 2022-11-15 PROCEDURE — PBSHW PNEUMOCOCCAL, PCV20, PREVNAR 20, (AGE 18 YRS+), IM, PF: Performed by: FAMILY MEDICINE

## 2023-01-03 RX ORDER — SIMVASTATIN 20 MG
TABLET ORAL
Qty: 90 TABLET | Refills: 3 | Status: SHIPPED | OUTPATIENT
Start: 2023-01-03

## 2023-01-03 RX ORDER — BETHANECHOL CHLORIDE 25 MG/1
TABLET ORAL
Qty: 360 TABLET | Refills: 3 | Status: SHIPPED | OUTPATIENT
Start: 2023-01-03

## 2023-01-05 DIAGNOSIS — I10 ESSENTIAL HYPERTENSION: ICD-10-CM

## 2023-01-05 RX ORDER — METOPROLOL SUCCINATE 50 MG/1
TABLET, EXTENDED RELEASE ORAL
Qty: 90 TABLET | Refills: 1 | Status: SHIPPED | OUTPATIENT
Start: 2023-01-05

## 2023-01-11 DIAGNOSIS — N40.0 BENIGN PROSTATIC HYPERPLASIA, UNSPECIFIED WHETHER LOWER URINARY TRACT SYMPTOMS PRESENT: ICD-10-CM

## 2023-01-11 RX ORDER — TAMSULOSIN HYDROCHLORIDE 0.4 MG/1
CAPSULE ORAL
Qty: 90 CAPSULE | Refills: 3 | Status: SHIPPED | OUTPATIENT
Start: 2023-01-11

## 2023-01-11 NOTE — TELEPHONE ENCOUNTER
PT med refill was denied but the script states refills but rx could not carry over the refills due to changes in their company possible glitch in system. He will need a new script.

## 2023-03-01 RX ORDER — AMLODIPINE BESYLATE 5 MG/1
TABLET ORAL
Qty: 90 TABLET | Refills: 3 | Status: SHIPPED | OUTPATIENT
Start: 2023-03-01

## 2023-03-01 RX ORDER — APIXABAN 2.5 MG/1
TABLET, FILM COATED ORAL
Qty: 180 TABLET | Refills: 3 | Status: SHIPPED | OUTPATIENT
Start: 2023-03-01

## 2023-03-21 DIAGNOSIS — I10 ESSENTIAL HYPERTENSION: ICD-10-CM

## 2023-03-22 RX ORDER — LISINOPRIL 30 MG/1
TABLET ORAL
Qty: 90 TABLET | Refills: 3 | Status: SHIPPED | OUTPATIENT
Start: 2023-03-22

## 2023-04-17 ENCOUNTER — HOSPITAL ENCOUNTER (INPATIENT)
Age: 88
LOS: 3 days | Discharge: HOME | End: 2023-04-20
Attending: EMERGENCY MEDICINE | Admitting: INTERNAL MEDICINE
Payer: MEDICARE

## 2023-04-17 ENCOUNTER — APPOINTMENT (OUTPATIENT)
Dept: GENERAL RADIOLOGY | Age: 88
End: 2023-04-17
Payer: MEDICARE

## 2023-04-17 DIAGNOSIS — R07.9 CHEST PAIN, UNSPECIFIED TYPE: ICD-10-CM

## 2023-04-17 DIAGNOSIS — I50.43 ACUTE ON CHRONIC COMBINED SYSTOLIC AND DIASTOLIC CONGESTIVE HEART FAILURE (HCC): Primary | ICD-10-CM

## 2023-04-17 DIAGNOSIS — I50.23 ACUTE ON CHRONIC SYSTOLIC CHF (CONGESTIVE HEART FAILURE) (HCC): ICD-10-CM

## 2023-04-17 PROBLEM — I51.7 CARDIOMEGALY: Status: ACTIVE | Noted: 2023-04-17

## 2023-04-17 LAB
ABSOLUTE EOS #: 0.06 K/UL (ref 0–0.44)
ABSOLUTE IMMATURE GRANULOCYTE: 0.03 K/UL (ref 0–0.3)
ABSOLUTE LYMPH #: 1.02 K/UL (ref 1.1–3.7)
ABSOLUTE MONO #: 0.44 K/UL (ref 0.1–1.2)
ALBUMIN SERPL-MCNC: 3.5 G/DL (ref 3.5–5.2)
ALBUMIN/GLOBULIN RATIO: 1 (ref 1–2.5)
ALP SERPL-CCNC: 130 U/L (ref 40–129)
ALT SERPL-CCNC: 16 U/L (ref 5–41)
ANION GAP SERPL CALCULATED.3IONS-SCNC: 7 MMOL/L (ref 9–17)
AST SERPL-CCNC: 20 U/L
BACTERIA: ABNORMAL
BASOPHILS # BLD: 0 % (ref 0–2)
BASOPHILS ABSOLUTE: <0.03 K/UL (ref 0–0.2)
BILIRUB SERPL-MCNC: 1.2 MG/DL (ref 0.3–1.2)
BILIRUBIN URINE: NEGATIVE
BNP SERPL-MCNC: 9219 PG/ML
BUN SERPL-MCNC: 19 MG/DL (ref 8–23)
BUN/CREAT BLD: 19 (ref 9–20)
CALCIUM SERPL-MCNC: 9.1 MG/DL (ref 8.6–10.4)
CHLORIDE SERPL-SCNC: 104 MMOL/L (ref 98–107)
CO2 SERPL-SCNC: 27 MMOL/L (ref 20–31)
COLOR: YELLOW
CREAT SERPL-MCNC: 1.01 MG/DL (ref 0.7–1.2)
EOSINOPHILS RELATIVE PERCENT: 1 % (ref 1–4)
EPITHELIAL CELLS UA: ABNORMAL /HPF (ref 0–5)
GFR SERPL CREATININE-BSD FRML MDRD: >60 ML/MIN/1.73M2
GLUCOSE SERPL-MCNC: 112 MG/DL (ref 70–99)
GLUCOSE UR STRIP.AUTO-MCNC: NEGATIVE MG/DL
HCT VFR BLD AUTO: 47.7 % (ref 40.7–50.3)
HGB BLD-MCNC: 15.5 G/DL (ref 13–17)
IMMATURE GRANULOCYTES: 0 %
INR PPP: 1.2
KETONES UR STRIP.AUTO-MCNC: NEGATIVE MG/DL
LACTIC ACID, SEPSIS: 1.1 MMOL/L (ref 0.5–1.9)
LEUKOCYTE ESTERASE UR QL STRIP.AUTO: NEGATIVE
LV EF: 40 %
LVEF MODALITY: NORMAL
LYMPHOCYTES # BLD: 15 % (ref 24–43)
MAGNESIUM SERPL-MCNC: 1.8 MG/DL (ref 1.6–2.6)
MCH RBC QN AUTO: 30.3 PG (ref 25.2–33.5)
MCHC RBC AUTO-ENTMCNC: 32.5 G/DL (ref 25.2–33.5)
MCV RBC AUTO: 93.2 FL (ref 82.6–102.9)
MONOCYTES # BLD: 6 % (ref 3–12)
NITRITE UR QL STRIP.AUTO: NEGATIVE
NRBC AUTOMATED: 0 PER 100 WBC
OTHER OBSERVATIONS UA: ABNORMAL
PDW BLD-RTO: 14.7 % (ref 11.8–14.4)
PLATELET # BLD AUTO: ABNORMAL K/UL (ref 138–453)
PLATELET, FLUORESCENCE: 118 K/UL (ref 138–453)
PLATELET, IMMATURE FRACTION: 4.6 % (ref 1.1–10.3)
POTASSIUM SERPL-SCNC: 3.9 MMOL/L (ref 3.7–5.3)
PROT SERPL-MCNC: 6.9 G/DL (ref 6.4–8.3)
PROT UR STRIP.AUTO-MCNC: 6 MG/DL (ref 5–6)
PROT UR STRIP.AUTO-MCNC: NEGATIVE MG/DL
PROTHROMBIN TIME: 15 SEC (ref 11.5–14.2)
RBC # BLD: 5.12 M/UL (ref 4.21–5.77)
RBC CLUMPS #/AREA URNS AUTO: ABNORMAL /HPF (ref 0–4)
SARS-COV-2 RDRP RESP QL NAA+PROBE: NOT DETECTED
SEG NEUTROPHILS: 77 % (ref 36–65)
SEGMENTED NEUTROPHILS ABSOLUTE COUNT: 5.26 K/UL (ref 1.5–8.1)
SODIUM SERPL-SCNC: 138 MMOL/L (ref 135–144)
SPECIFIC GRAVITY UA: 1.02 (ref 1.01–1.02)
SPECIMEN DESCRIPTION: NORMAL
TROPONIN I SERPL DL<=0.01 NG/ML-MCNC: 31 NG/L (ref 0–22)
TROPONIN I SERPL DL<=0.01 NG/ML-MCNC: 33 NG/L (ref 0–22)
TROPONIN I SERPL DL<=0.01 NG/ML-MCNC: 39 NG/L (ref 0–22)
TROPONIN I SERPL DL<=0.01 NG/ML-MCNC: 41 NG/L (ref 0–22)
TURBIDITY: CLEAR
URINE HGB: ABNORMAL
UROBILINOGEN, URINE: NORMAL
WBC # BLD AUTO: 6.8 K/UL (ref 3.5–11.3)
WBC UA: ABNORMAL /HPF (ref 0–4)

## 2023-04-17 PROCEDURE — 85025 COMPLETE CBC W/AUTO DIFF WBC: CPT

## 2023-04-17 PROCEDURE — 85610 PROTHROMBIN TIME: CPT

## 2023-04-17 PROCEDURE — 83880 ASSAY OF NATRIURETIC PEPTIDE: CPT

## 2023-04-17 PROCEDURE — 80053 COMPREHEN METABOLIC PANEL: CPT

## 2023-04-17 PROCEDURE — 85055 RETICULATED PLATELET ASSAY: CPT

## 2023-04-17 PROCEDURE — 6360000002 HC RX W HCPCS

## 2023-04-17 PROCEDURE — 81001 URINALYSIS AUTO W/SCOPE: CPT

## 2023-04-17 PROCEDURE — 6370000000 HC RX 637 (ALT 250 FOR IP): Performed by: INTERNAL MEDICINE

## 2023-04-17 PROCEDURE — 87635 SARS-COV-2 COVID-19 AMP PRB: CPT

## 2023-04-17 PROCEDURE — 6360000002 HC RX W HCPCS: Performed by: EMERGENCY MEDICINE

## 2023-04-17 PROCEDURE — 84484 ASSAY OF TROPONIN QUANT: CPT

## 2023-04-17 PROCEDURE — 93306 TTE W/DOPPLER COMPLETE: CPT

## 2023-04-17 PROCEDURE — 6370000000 HC RX 637 (ALT 250 FOR IP)

## 2023-04-17 PROCEDURE — 71045 X-RAY EXAM CHEST 1 VIEW: CPT

## 2023-04-17 PROCEDURE — 6360000002 HC RX W HCPCS: Performed by: INTERNAL MEDICINE

## 2023-04-17 PROCEDURE — 99285 EMERGENCY DEPT VISIT HI MDM: CPT

## 2023-04-17 PROCEDURE — 83605 ASSAY OF LACTIC ACID: CPT

## 2023-04-17 PROCEDURE — 2060000000 HC ICU INTERMEDIATE R&B

## 2023-04-17 PROCEDURE — 87040 BLOOD CULTURE FOR BACTERIA: CPT

## 2023-04-17 PROCEDURE — 2500000003 HC RX 250 WO HCPCS

## 2023-04-17 PROCEDURE — 83735 ASSAY OF MAGNESIUM: CPT

## 2023-04-17 PROCEDURE — 36415 COLL VENOUS BLD VENIPUNCTURE: CPT

## 2023-04-17 PROCEDURE — 51702 INSERT TEMP BLADDER CATH: CPT

## 2023-04-17 PROCEDURE — 93005 ELECTROCARDIOGRAM TRACING: CPT | Performed by: EMERGENCY MEDICINE

## 2023-04-17 PROCEDURE — 99222 1ST HOSP IP/OBS MODERATE 55: CPT | Performed by: INTERNAL MEDICINE

## 2023-04-17 RX ORDER — TAMSULOSIN HYDROCHLORIDE 0.4 MG/1
0.4 CAPSULE ORAL DAILY
Status: DISCONTINUED | OUTPATIENT
Start: 2023-04-18 | End: 2023-04-20 | Stop reason: HOSPADM

## 2023-04-17 RX ORDER — FUROSEMIDE 10 MG/ML
40 INJECTION INTRAMUSCULAR; INTRAVENOUS 2 TIMES DAILY
Status: DISCONTINUED | OUTPATIENT
Start: 2023-04-17 | End: 2023-04-18

## 2023-04-17 RX ORDER — ASPIRIN 81 MG/1
81 TABLET ORAL DAILY
Status: DISCONTINUED | OUTPATIENT
Start: 2023-04-18 | End: 2023-04-20 | Stop reason: HOSPADM

## 2023-04-17 RX ORDER — HYDROCODONE BITARTRATE AND ACETAMINOPHEN 5; 325 MG/1; MG/1
1 TABLET ORAL EVERY 4 HOURS PRN
Status: DISCONTINUED | OUTPATIENT
Start: 2023-04-17 | End: 2023-04-20 | Stop reason: HOSPADM

## 2023-04-17 RX ORDER — ATORVASTATIN CALCIUM 10 MG/1
10 TABLET, FILM COATED ORAL NIGHTLY
Status: DISCONTINUED | OUTPATIENT
Start: 2023-04-17 | End: 2023-04-20 | Stop reason: HOSPADM

## 2023-04-17 RX ORDER — SODIUM CHLORIDE FOR INHALATION 0.9 %
3 VIAL, NEBULIZER (ML) INHALATION
Status: DISCONTINUED | OUTPATIENT
Start: 2023-04-17 | End: 2023-04-20 | Stop reason: HOSPADM

## 2023-04-17 RX ORDER — SODIUM CHLORIDE 0.9 % (FLUSH) 0.9 %
10 SYRINGE (ML) INJECTION EVERY 12 HOURS SCHEDULED
Status: DISCONTINUED | OUTPATIENT
Start: 2023-04-17 | End: 2023-04-20 | Stop reason: HOSPADM

## 2023-04-17 RX ORDER — LISINOPRIL 30 MG/1
1 TABLET ORAL DAILY
Status: DISCONTINUED | OUTPATIENT
Start: 2023-04-17 | End: 2023-04-17

## 2023-04-17 RX ORDER — HYDRALAZINE HYDROCHLORIDE 20 MG/ML
10 INJECTION INTRAMUSCULAR; INTRAVENOUS EVERY 6 HOURS PRN
Status: DISCONTINUED | OUTPATIENT
Start: 2023-04-17 | End: 2023-04-20 | Stop reason: HOSPADM

## 2023-04-17 RX ORDER — AMLODIPINE BESYLATE 5 MG/1
5 TABLET ORAL DAILY
Status: DISCONTINUED | OUTPATIENT
Start: 2023-04-18 | End: 2023-04-20 | Stop reason: HOSPADM

## 2023-04-17 RX ORDER — FUROSEMIDE 10 MG/ML
80 INJECTION INTRAMUSCULAR; INTRAVENOUS ONCE
Status: COMPLETED | OUTPATIENT
Start: 2023-04-17 | End: 2023-04-17

## 2023-04-17 RX ORDER — ACETAMINOPHEN 325 MG/1
650 TABLET ORAL EVERY 4 HOURS PRN
Status: DISCONTINUED | OUTPATIENT
Start: 2023-04-17 | End: 2023-04-20 | Stop reason: HOSPADM

## 2023-04-17 RX ORDER — HYDROCODONE BITARTRATE AND ACETAMINOPHEN 5; 325 MG/1; MG/1
2 TABLET ORAL EVERY 4 HOURS PRN
Status: DISCONTINUED | OUTPATIENT
Start: 2023-04-17 | End: 2023-04-20 | Stop reason: HOSPADM

## 2023-04-17 RX ORDER — ALBUTEROL SULFATE 2.5 MG/3ML
2.5 SOLUTION RESPIRATORY (INHALATION)
Status: DISCONTINUED | OUTPATIENT
Start: 2023-04-17 | End: 2023-04-20 | Stop reason: HOSPADM

## 2023-04-17 RX ORDER — SODIUM CHLORIDE 9 MG/ML
INJECTION, SOLUTION INTRAVENOUS PRN
Status: DISCONTINUED | OUTPATIENT
Start: 2023-04-17 | End: 2023-04-20 | Stop reason: HOSPADM

## 2023-04-17 RX ORDER — SODIUM CHLORIDE 0.9 % (FLUSH) 0.9 %
10 SYRINGE (ML) INJECTION PRN
Status: DISCONTINUED | OUTPATIENT
Start: 2023-04-17 | End: 2023-04-20 | Stop reason: HOSPADM

## 2023-04-17 RX ORDER — BETHANECHOL CHLORIDE 25 MG/1
25 TABLET ORAL 2 TIMES DAILY
Status: DISCONTINUED | OUTPATIENT
Start: 2023-04-17 | End: 2023-04-20 | Stop reason: HOSPADM

## 2023-04-17 RX ORDER — IPRATROPIUM BROMIDE AND ALBUTEROL SULFATE 2.5; .5 MG/3ML; MG/3ML
1 SOLUTION RESPIRATORY (INHALATION)
Status: DISCONTINUED | OUTPATIENT
Start: 2023-04-17 | End: 2023-04-20 | Stop reason: HOSPADM

## 2023-04-17 RX ORDER — ONDANSETRON 2 MG/ML
4 INJECTION INTRAMUSCULAR; INTRAVENOUS EVERY 6 HOURS PRN
Status: DISCONTINUED | OUTPATIENT
Start: 2023-04-17 | End: 2023-04-20 | Stop reason: HOSPADM

## 2023-04-17 RX ORDER — METOPROLOL SUCCINATE 50 MG/1
50 TABLET, EXTENDED RELEASE ORAL DAILY
Status: DISCONTINUED | OUTPATIENT
Start: 2023-04-18 | End: 2023-04-20 | Stop reason: HOSPADM

## 2023-04-17 RX ADMIN — FUROSEMIDE 80 MG: 10 INJECTION, SOLUTION INTRAMUSCULAR; INTRAVENOUS at 11:21

## 2023-04-17 RX ADMIN — HYDRALAZINE HYDROCHLORIDE 10 MG: 20 INJECTION INTRAMUSCULAR; INTRAVENOUS at 21:51

## 2023-04-17 RX ADMIN — ATORVASTATIN CALCIUM 10 MG: 10 TABLET, FILM COATED ORAL at 20:59

## 2023-04-17 RX ADMIN — BETHANECHOL CHLORIDE 25 MG: 25 TABLET ORAL at 20:59

## 2023-04-17 RX ADMIN — APIXABAN 5 MG: 5 TABLET, FILM COATED ORAL at 20:59

## 2023-04-17 RX ADMIN — MICONAZOLE NITRATE: 20 POWDER TOPICAL at 21:52

## 2023-04-17 RX ADMIN — FUROSEMIDE 40 MG: 10 INJECTION, SOLUTION INTRAMUSCULAR; INTRAVENOUS at 18:37

## 2023-04-17 RX ADMIN — HYDROCODONE BITARTRATE AND ACETAMINOPHEN 1 TABLET: 5; 325 TABLET ORAL at 21:51

## 2023-04-17 ASSESSMENT — ENCOUNTER SYMPTOMS
NAUSEA: 0
WHEEZING: 0
SHORTNESS OF BREATH: 1
CONSTIPATION: 0
VOMITING: 0
TROUBLE SWALLOWING: 0
BACK PAIN: 0
SORE THROAT: 0
DIARRHEA: 0
ABDOMINAL PAIN: 0

## 2023-04-17 ASSESSMENT — PAIN - FUNCTIONAL ASSESSMENT: PAIN_FUNCTIONAL_ASSESSMENT: PREVENTS OR INTERFERES SOME ACTIVE ACTIVITIES AND ADLS

## 2023-04-17 ASSESSMENT — PAIN DESCRIPTION - FREQUENCY: FREQUENCY: INTERMITTENT

## 2023-04-17 ASSESSMENT — PAIN SCALES - GENERAL: PAINLEVEL_OUTOF10: 6

## 2023-04-17 ASSESSMENT — PAIN DESCRIPTION - LOCATION: LOCATION: CHEST

## 2023-04-17 ASSESSMENT — PAIN DESCRIPTION - PAIN TYPE: TYPE: ACUTE PAIN

## 2023-04-17 ASSESSMENT — LIFESTYLE VARIABLES: HOW OFTEN DO YOU HAVE A DRINK CONTAINING ALCOHOL: NEVER

## 2023-04-17 ASSESSMENT — PAIN DESCRIPTION - DESCRIPTORS: DESCRIPTORS: PRESSURE

## 2023-04-17 NOTE — H&P
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      See orders     Note that over 55 minutes was spent in evaluation of the patient, review of the chart and pertinent records, discussion with family/staff, etc    Kristy Cramer MD MD  11:56 AM  4/17/2023

## 2023-04-17 NOTE — ED PROVIDER NOTES
temperature is 96.3 °F (35.7 °C) (abnormal). His blood pressure is 166/97 (abnormal) and his pulse is 68. His respiration is 35 (abnormal) and oxygen saturation is 91%. Physical Exam  Constitutional:       General: He is not in acute distress. Appearance: He is well-developed. He is obese. He is not toxic-appearing or diaphoretic. HENT:      Head: Normocephalic and atraumatic. Right Ear: External ear normal.      Left Ear: External ear normal.   Eyes:      Pupils: Pupils are equal, round, and reactive to light. Cardiovascular:      Rate and Rhythm: Normal rate and regular rhythm. Pulmonary:      Effort: Pulmonary effort is normal. Tachypnea present. No accessory muscle usage, prolonged expiration or respiratory distress. Breath sounds: Examination of the right-lower field reveals rales. Examination of the left-lower field reveals rales. Rales present. Abdominal:      General: Bowel sounds are normal.      Palpations: Abdomen is soft. Musculoskeletal:         General: Normal range of motion. Cervical back: Normal range of motion and neck supple. Right lower leg: No tenderness. Edema present. Left lower leg: No tenderness. Edema present. Skin:     General: Skin is warm and dry. Neurological:      General: No focal deficit present. Mental Status: He is alert and oriented to person, place, and time. Psychiatric:         Behavior: Behavior normal.         DIFFERENTIAL DIAGNOSIS/ MDM:     Differential diagnosis considered: Acute exacerbation of CHF rule out pneumonia COVID ACS    Chronic Conditions affecting care (DM,HTN,CA, etc):  History of heart failure history of A-fib on [facility]    Social Determinants of Health affecting care (unable to care for self, lives alone, unemployed, homeless,etc): Patient here with family    History source(s) (patient,spouse,parent,family,friend,EMS,etc): Patient    Review of external sources (ECF,Hospital records,EMS report, radiology

## 2023-04-18 ENCOUNTER — APPOINTMENT (OUTPATIENT)
Dept: GENERAL RADIOLOGY | Age: 88
End: 2023-04-18
Payer: MEDICARE

## 2023-04-18 LAB
ABSOLUTE EOS #: 0.13 K/UL (ref 0–0.44)
ABSOLUTE IMMATURE GRANULOCYTE: 0.03 K/UL (ref 0–0.3)
ABSOLUTE LYMPH #: 1.17 K/UL (ref 1.1–3.7)
ABSOLUTE MONO #: 0.72 K/UL (ref 0.1–1.2)
ANION GAP SERPL CALCULATED.3IONS-SCNC: 10 MMOL/L (ref 9–17)
BASOPHILS # BLD: 0 % (ref 0–2)
BASOPHILS ABSOLUTE: <0.03 K/UL (ref 0–0.2)
BUN SERPL-MCNC: 21 MG/DL (ref 8–23)
BUN/CREAT BLD: 15 (ref 9–20)
CALCIUM SERPL-MCNC: 9.3 MG/DL (ref 8.6–10.4)
CHLORIDE SERPL-SCNC: 103 MMOL/L (ref 98–107)
CO2 SERPL-SCNC: 32 MMOL/L (ref 20–31)
CREAT SERPL-MCNC: 1.38 MG/DL (ref 0.7–1.2)
EOSINOPHILS RELATIVE PERCENT: 2 % (ref 1–4)
GFR SERPL CREATININE-BSD FRML MDRD: 49 ML/MIN/1.73M2
GLUCOSE SERPL-MCNC: 106 MG/DL (ref 70–99)
HCT VFR BLD AUTO: 48.3 % (ref 40.7–50.3)
HGB BLD-MCNC: 15.3 G/DL (ref 13–17)
IMMATURE GRANULOCYTES: 0 %
LYMPHOCYTES # BLD: 16 % (ref 24–43)
MCH RBC QN AUTO: 29.7 PG (ref 25.2–33.5)
MCHC RBC AUTO-ENTMCNC: 31.7 G/DL (ref 25.2–33.5)
MCV RBC AUTO: 93.8 FL (ref 82.6–102.9)
MONOCYTES # BLD: 10 % (ref 3–12)
NRBC AUTOMATED: 0 PER 100 WBC
PDW BLD-RTO: 14.8 % (ref 11.8–14.4)
PLATELET # BLD AUTO: ABNORMAL K/UL (ref 138–453)
PLATELET, FLUORESCENCE: 132 K/UL (ref 138–453)
PLATELET, IMMATURE FRACTION: 5.6 % (ref 1.1–10.3)
POTASSIUM SERPL-SCNC: 4.5 MMOL/L (ref 3.7–5.3)
RBC # BLD: 5.15 M/UL (ref 4.21–5.77)
SEG NEUTROPHILS: 72 % (ref 36–65)
SEGMENTED NEUTROPHILS ABSOLUTE COUNT: 5.3 K/UL (ref 1.5–8.1)
SODIUM SERPL-SCNC: 145 MMOL/L (ref 135–144)
WBC # BLD AUTO: 7.4 K/UL (ref 3.5–11.3)

## 2023-04-18 PROCEDURE — 99231 SBSQ HOSP IP/OBS SF/LOW 25: CPT | Performed by: INTERNAL MEDICINE

## 2023-04-18 PROCEDURE — 93005 ELECTROCARDIOGRAM TRACING: CPT | Performed by: INTERNAL MEDICINE

## 2023-04-18 PROCEDURE — 97161 PT EVAL LOW COMPLEX 20 MIN: CPT | Performed by: PHYSICAL THERAPIST

## 2023-04-18 PROCEDURE — 85055 RETICULATED PLATELET ASSAY: CPT

## 2023-04-18 PROCEDURE — 80048 BASIC METABOLIC PNL TOTAL CA: CPT

## 2023-04-18 PROCEDURE — 6370000000 HC RX 637 (ALT 250 FOR IP): Performed by: INTERNAL MEDICINE

## 2023-04-18 PROCEDURE — 85025 COMPLETE CBC W/AUTO DIFF WBC: CPT

## 2023-04-18 PROCEDURE — 94640 AIRWAY INHALATION TREATMENT: CPT

## 2023-04-18 PROCEDURE — 36415 COLL VENOUS BLD VENIPUNCTURE: CPT

## 2023-04-18 PROCEDURE — 97165 OT EVAL LOW COMPLEX 30 MIN: CPT | Performed by: OCCUPATIONAL THERAPIST

## 2023-04-18 PROCEDURE — 6370000000 HC RX 637 (ALT 250 FOR IP)

## 2023-04-18 PROCEDURE — 6360000002 HC RX W HCPCS: Performed by: INTERNAL MEDICINE

## 2023-04-18 PROCEDURE — 71045 X-RAY EXAM CHEST 1 VIEW: CPT

## 2023-04-18 PROCEDURE — 2580000003 HC RX 258: Performed by: INTERNAL MEDICINE

## 2023-04-18 PROCEDURE — 2060000000 HC ICU INTERMEDIATE R&B

## 2023-04-18 PROCEDURE — 94760 N-INVAS EAR/PLS OXIMETRY 1: CPT

## 2023-04-18 RX ORDER — FUROSEMIDE 10 MG/ML
20 INJECTION INTRAMUSCULAR; INTRAVENOUS DAILY
Status: DISCONTINUED | OUTPATIENT
Start: 2023-04-19 | End: 2023-04-20 | Stop reason: HOSPADM

## 2023-04-18 RX ADMIN — APIXABAN 5 MG: 5 TABLET, FILM COATED ORAL at 21:43

## 2023-04-18 RX ADMIN — ATORVASTATIN CALCIUM 10 MG: 10 TABLET, FILM COATED ORAL at 21:43

## 2023-04-18 RX ADMIN — BETHANECHOL CHLORIDE 25 MG: 25 TABLET ORAL at 21:43

## 2023-04-18 RX ADMIN — BETHANECHOL CHLORIDE 25 MG: 25 TABLET ORAL at 09:52

## 2023-04-18 RX ADMIN — ASPIRIN 81 MG: 81 TABLET, COATED ORAL at 09:52

## 2023-04-18 RX ADMIN — TAMSULOSIN HYDROCHLORIDE 0.4 MG: 0.4 CAPSULE ORAL at 09:49

## 2023-04-18 RX ADMIN — LISINOPRIL 30 MG: 20 TABLET ORAL at 09:49

## 2023-04-18 RX ADMIN — MICONAZOLE NITRATE: 20 POWDER TOPICAL at 09:53

## 2023-04-18 RX ADMIN — IPRATROPIUM BROMIDE AND ALBUTEROL SULFATE 1 AMPULE: .5; 2.5 SOLUTION RESPIRATORY (INHALATION) at 21:12

## 2023-04-18 RX ADMIN — APIXABAN 5 MG: 5 TABLET, FILM COATED ORAL at 09:49

## 2023-04-18 RX ADMIN — HYDROCODONE BITARTRATE AND ACETAMINOPHEN 2 TABLET: 5; 325 TABLET ORAL at 02:57

## 2023-04-18 RX ADMIN — IPRATROPIUM BROMIDE AND ALBUTEROL SULFATE 1 AMPULE: .5; 2.5 SOLUTION RESPIRATORY (INHALATION) at 12:31

## 2023-04-18 RX ADMIN — MICONAZOLE NITRATE: 20 POWDER TOPICAL at 21:46

## 2023-04-18 RX ADMIN — IPRATROPIUM BROMIDE AND ALBUTEROL SULFATE 1 AMPULE: .5; 2.5 SOLUTION RESPIRATORY (INHALATION) at 16:21

## 2023-04-18 RX ADMIN — FUROSEMIDE 40 MG: 10 INJECTION, SOLUTION INTRAMUSCULAR; INTRAVENOUS at 09:49

## 2023-04-18 RX ADMIN — SODIUM CHLORIDE, PRESERVATIVE FREE 10 ML: 5 INJECTION INTRAVENOUS at 21:46

## 2023-04-18 RX ADMIN — AMLODIPINE BESYLATE 5 MG: 5 TABLET ORAL at 09:52

## 2023-04-18 RX ADMIN — SODIUM CHLORIDE, PRESERVATIVE FREE 10 ML: 5 INJECTION INTRAVENOUS at 09:49

## 2023-04-18 RX ADMIN — METOPROLOL SUCCINATE 50 MG: 50 TABLET, EXTENDED RELEASE ORAL at 09:49

## 2023-04-18 RX ADMIN — IPRATROPIUM BROMIDE AND ALBUTEROL SULFATE 1 AMPULE: .5; 2.5 SOLUTION RESPIRATORY (INHALATION) at 08:48

## 2023-04-18 NOTE — CONSULTS
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 Mild left ventricular hypertrophy. Left ventricular systolic function is moderately reduced. Left ventricular ejection fraction 40 %. Unable to evaluate diastolic function. Left atrium is moderately dilated. Right atrial dilatation. Right ventricular dilatation with reduced systolic function. Aortic leaflet calcification with possible functionally bicuspid AV. Mild mitral valve thickening with annular calcification. Mild mitral regurgitation. Trivial tricuspid regurgitation. Estimated right ventricular systolic pressure is 42 mmHg. Mild pulmonary  hypertension. Aortic root is mildly dilated at 4.4 cm. IMPRESSION & Recommendations:      CHF exacerbation. EF 40%. Likely EtOH induced. Optimize meds and can have work up as outpatient once respiratory status improves. AECOPD? Wheezing, will defer to primary team  EtOH- may have cardiomyopathy due to this. Discussed with patient, family, and Nurse. Electronically signed by Lindsey Moreno DO on 4/18/2023 at 11:03 AM    Lindsey Moreno, [id number] [facility]  [facility] [employer]  [phone]

## 2023-04-18 NOTE — CARE COORDINATION
Case Management Assessment  Initial Evaluation    Date/Time of Evaluation: 4/18/2023 11:20 AM  Assessment Completed by: Caterina Stock RN    If patient is discharged prior to next notation, then this note serves as note for discharge by case management. Patient Name: Marie Heredia                   YOB: 1933  Diagnosis: Acute on chronic combined systolic and diastolic congestive heart failure (HCC) [I50.43]  CHF (congestive heart failure), NYHA class I, acute on chronic, combined ([facility] [address] [I50.43]  Chest pain, unspecified type [R07.9]  Acute on chronic systolic CHF (congestive heart failure) ([facility] [address] [I50.23]                   Date / Time: 4/17/2023  8:23 AM    Patient Admission Status: Inpatient   Readmission Risk (Low < 19, Mod (19-27), High > 27): Readmission Risk Score: 11.1    Current PCP: Angus Tracey MD  PCP verified by CM? Yes    Chart Reviewed: Yes      History Provided by: Patient  Patient Orientation: Alert and Oriented    Patient Cognition: Alert    Hospitalization in the last 30 days (Readmission):  No    If yes, Readmission Assessment in CM Navigator will be completed. Advance Directives:      Code Status: Full Code   Patient's Primary Decision Maker is:        Discharge Planning:    Patient lives with: Alone Type of Home: House  Primary Care Giver: Self  Patient Support Systems include: Children   Current Financial resources: Medicare  Current community resources:    Current services prior to admission: None            Current DME:              Type of Home Care services:  None    ADLS  Prior functional level: Independent in ADLs/IADLs  Current functional level: Independent in ADLs/IADLs    PT AM-PAC:   /24  OT AM-PAC:   /24    Family can provide assistance at DC: Yes  Would you like Case Management to discuss the discharge plan with any other family members/significant others, and if so, who?  No  Plans to Return to Present Housing: Yes  Other Identified Issues/Barriers to

## 2023-04-18 NOTE — FLOWSHEET NOTE
rounding in PCU. Assessment: Patient was sleeping. Plan: Chaplains are available on site or on call 24/7 for spiritual and emotional support.     Electronically signed by Sarah Almaraz on 4/18/2023 at 2:44 PM

## 2023-04-19 ENCOUNTER — APPOINTMENT (OUTPATIENT)
Dept: GENERAL RADIOLOGY | Age: 88
End: 2023-04-19
Payer: MEDICARE

## 2023-04-19 LAB
ABSOLUTE EOS #: 0.11 K/UL (ref 0–0.44)
ABSOLUTE IMMATURE GRANULOCYTE: 0.03 K/UL (ref 0–0.3)
ABSOLUTE LYMPH #: 1.05 K/UL (ref 1.1–3.7)
ABSOLUTE MONO #: 0.76 K/UL (ref 0.1–1.2)
ANION GAP SERPL CALCULATED.3IONS-SCNC: 8 MMOL/L (ref 9–17)
BASOPHILS # BLD: 0 % (ref 0–2)
BASOPHILS ABSOLUTE: <0.03 K/UL (ref 0–0.2)
BUN SERPL-MCNC: 21 MG/DL (ref 8–23)
BUN/CREAT BLD: 16 (ref 9–20)
CALCIUM SERPL-MCNC: 9.2 MG/DL (ref 8.6–10.4)
CHLORIDE SERPL-SCNC: 101 MMOL/L (ref 98–107)
CO2 SERPL-SCNC: 33 MMOL/L (ref 20–31)
CREAT SERPL-MCNC: 1.33 MG/DL (ref 0.7–1.2)
EKG ATRIAL RATE: 79 BPM
EKG P AXIS: 42 DEGREES
EKG P-R INTERVAL: 196 MS
EKG Q-T INTERVAL: 408 MS
EKG Q-T INTERVAL: 422 MS
EKG Q-T INTERVAL: 450 MS
EKG QRS DURATION: 104 MS
EKG QRS DURATION: 108 MS
EKG QRS DURATION: 96 MS
EKG QTC CALCULATION (BAZETT): 443 MS
EKG QTC CALCULATION (BAZETT): 483 MS
EKG QTC CALCULATION (BAZETT): 486 MS
EKG R AXIS: 12 DEGREES
EKG R AXIS: 36 DEGREES
EKG R AXIS: 44 DEGREES
EKG T AXIS: -142 DEGREES
EKG T AXIS: 161 DEGREES
EKG T AXIS: 162 DEGREES
EKG VENTRICULAR RATE: 70 BPM
EKG VENTRICULAR RATE: 71 BPM
EKG VENTRICULAR RATE: 79 BPM
EOSINOPHILS RELATIVE PERCENT: 1 % (ref 1–4)
GFR SERPL CREATININE-BSD FRML MDRD: 51 ML/MIN/1.73M2
GLUCOSE SERPL-MCNC: 103 MG/DL (ref 70–99)
HCT VFR BLD AUTO: 46 % (ref 40.7–50.3)
HGB BLD-MCNC: 14.9 G/DL (ref 13–17)
IMMATURE GRANULOCYTES: 0 %
LYMPHOCYTES # BLD: 12 % (ref 24–43)
MCH RBC QN AUTO: 29.9 PG (ref 25.2–33.5)
MCHC RBC AUTO-ENTMCNC: 32.4 G/DL (ref 25.2–33.5)
MCV RBC AUTO: 92.2 FL (ref 82.6–102.9)
MONOCYTES # BLD: 9 % (ref 3–12)
NRBC AUTOMATED: 0 PER 100 WBC
PDW BLD-RTO: 15 % (ref 11.8–14.4)
PLATELET # BLD AUTO: ABNORMAL K/UL (ref 138–453)
PLATELET, FLUORESCENCE: 129 K/UL (ref 138–453)
PLATELET, IMMATURE FRACTION: 4.5 % (ref 1.1–10.3)
POTASSIUM SERPL-SCNC: 3.9 MMOL/L (ref 3.7–5.3)
RBC # BLD: 4.99 M/UL (ref 4.21–5.77)
SEG NEUTROPHILS: 77 % (ref 36–65)
SEGMENTED NEUTROPHILS ABSOLUTE COUNT: 6.59 K/UL (ref 1.5–8.1)
SODIUM SERPL-SCNC: 142 MMOL/L (ref 135–144)
WBC # BLD AUTO: 8.6 K/UL (ref 3.5–11.3)

## 2023-04-19 PROCEDURE — 6370000000 HC RX 637 (ALT 250 FOR IP): Performed by: INTERNAL MEDICINE

## 2023-04-19 PROCEDURE — 94760 N-INVAS EAR/PLS OXIMETRY 1: CPT

## 2023-04-19 PROCEDURE — 99231 SBSQ HOSP IP/OBS SF/LOW 25: CPT | Performed by: INTERNAL MEDICINE

## 2023-04-19 PROCEDURE — 6360000002 HC RX W HCPCS: Performed by: INTERNAL MEDICINE

## 2023-04-19 PROCEDURE — 80048 BASIC METABOLIC PNL TOTAL CA: CPT

## 2023-04-19 PROCEDURE — 71045 X-RAY EXAM CHEST 1 VIEW: CPT

## 2023-04-19 PROCEDURE — 85025 COMPLETE CBC W/AUTO DIFF WBC: CPT

## 2023-04-19 PROCEDURE — 6360000002 HC RX W HCPCS

## 2023-04-19 PROCEDURE — 2580000003 HC RX 258: Performed by: INTERNAL MEDICINE

## 2023-04-19 PROCEDURE — 85055 RETICULATED PLATELET ASSAY: CPT

## 2023-04-19 PROCEDURE — 2060000000 HC ICU INTERMEDIATE R&B

## 2023-04-19 PROCEDURE — 94640 AIRWAY INHALATION TREATMENT: CPT

## 2023-04-19 PROCEDURE — 36415 COLL VENOUS BLD VENIPUNCTURE: CPT

## 2023-04-19 PROCEDURE — 51798 US URINE CAPACITY MEASURE: CPT

## 2023-04-19 RX ADMIN — METOPROLOL SUCCINATE 50 MG: 50 TABLET, EXTENDED RELEASE ORAL at 10:04

## 2023-04-19 RX ADMIN — BETHANECHOL CHLORIDE 25 MG: 25 TABLET ORAL at 10:04

## 2023-04-19 RX ADMIN — SODIUM CHLORIDE, PRESERVATIVE FREE 10 ML: 5 INJECTION INTRAVENOUS at 10:04

## 2023-04-19 RX ADMIN — ASPIRIN 81 MG: 81 TABLET, COATED ORAL at 10:04

## 2023-04-19 RX ADMIN — TAMSULOSIN HYDROCHLORIDE 0.4 MG: 0.4 CAPSULE ORAL at 10:04

## 2023-04-19 RX ADMIN — IPRATROPIUM BROMIDE AND ALBUTEROL SULFATE 1 AMPULE: .5; 2.5 SOLUTION RESPIRATORY (INHALATION) at 20:13

## 2023-04-19 RX ADMIN — APIXABAN 5 MG: 5 TABLET, FILM COATED ORAL at 10:04

## 2023-04-19 RX ADMIN — HYDRALAZINE HYDROCHLORIDE 10 MG: 20 INJECTION INTRAMUSCULAR; INTRAVENOUS at 06:27

## 2023-04-19 RX ADMIN — BETHANECHOL CHLORIDE 25 MG: 25 TABLET ORAL at 20:19

## 2023-04-19 RX ADMIN — APIXABAN 5 MG: 5 TABLET, FILM COATED ORAL at 20:19

## 2023-04-19 RX ADMIN — ATORVASTATIN CALCIUM 10 MG: 10 TABLET, FILM COATED ORAL at 20:19

## 2023-04-19 RX ADMIN — MICONAZOLE NITRATE: 20 POWDER TOPICAL at 10:05

## 2023-04-19 RX ADMIN — SODIUM CHLORIDE, PRESERVATIVE FREE 10 ML: 5 INJECTION INTRAVENOUS at 20:19

## 2023-04-19 RX ADMIN — AMLODIPINE BESYLATE 5 MG: 5 TABLET ORAL at 10:04

## 2023-04-19 RX ADMIN — IPRATROPIUM BROMIDE AND ALBUTEROL SULFATE 1 AMPULE: .5; 2.5 SOLUTION RESPIRATORY (INHALATION) at 11:41

## 2023-04-19 RX ADMIN — IPRATROPIUM BROMIDE AND ALBUTEROL SULFATE 1 AMPULE: .5; 2.5 SOLUTION RESPIRATORY (INHALATION) at 15:34

## 2023-04-19 RX ADMIN — IPRATROPIUM BROMIDE AND ALBUTEROL SULFATE 1 AMPULE: .5; 2.5 SOLUTION RESPIRATORY (INHALATION) at 08:11

## 2023-04-19 RX ADMIN — MICONAZOLE NITRATE: 20 POWDER TOPICAL at 20:20

## 2023-04-19 RX ADMIN — FUROSEMIDE 20 MG: 10 INJECTION, SOLUTION INTRAMUSCULAR; INTRAVENOUS at 10:04

## 2023-04-20 ENCOUNTER — TELEPHONE (OUTPATIENT)
Dept: FAMILY MEDICINE CLINIC | Age: 88
End: 2023-04-20

## 2023-04-20 ENCOUNTER — APPOINTMENT (OUTPATIENT)
Dept: GENERAL RADIOLOGY | Age: 88
End: 2023-04-20
Payer: MEDICARE

## 2023-04-20 VITALS
HEART RATE: 66 BPM | TEMPERATURE: 97.8 F | DIASTOLIC BLOOD PRESSURE: 95 MMHG | SYSTOLIC BLOOD PRESSURE: 166 MMHG | OXYGEN SATURATION: 95 % | BODY MASS INDEX: 31.33 KG/M2 | WEIGHT: 223.8 LBS | RESPIRATION RATE: 20 BRPM | HEIGHT: 71 IN

## 2023-04-20 LAB
ABSOLUTE EOS #: 0.17 K/UL (ref 0–0.44)
ABSOLUTE IMMATURE GRANULOCYTE: <0.03 K/UL (ref 0–0.3)
ABSOLUTE LYMPH #: 0.94 K/UL (ref 1.1–3.7)
ABSOLUTE MONO #: 0.58 K/UL (ref 0.1–1.2)
ANION GAP SERPL CALCULATED.3IONS-SCNC: 12 MMOL/L (ref 9–17)
BASOPHILS # BLD: 1 % (ref 0–2)
BASOPHILS ABSOLUTE: 0.03 K/UL (ref 0–0.2)
BUN SERPL-MCNC: 23 MG/DL (ref 8–23)
BUN/CREAT BLD: 19 (ref 9–20)
CALCIUM SERPL-MCNC: 9.1 MG/DL (ref 8.6–10.4)
CHLORIDE SERPL-SCNC: 102 MMOL/L (ref 98–107)
CO2 SERPL-SCNC: 28 MMOL/L (ref 20–31)
CREAT SERPL-MCNC: 1.23 MG/DL (ref 0.7–1.2)
EOSINOPHILS RELATIVE PERCENT: 3 % (ref 1–4)
GFR SERPL CREATININE-BSD FRML MDRD: 56 ML/MIN/1.73M2
GLUCOSE SERPL-MCNC: 97 MG/DL (ref 70–99)
HCT VFR BLD AUTO: 47 % (ref 40.7–50.3)
HGB BLD-MCNC: 15.1 G/DL (ref 13–17)
IMMATURE GRANULOCYTES: 0 %
LYMPHOCYTES # BLD: 14 % (ref 24–43)
MCH RBC QN AUTO: 29.8 PG (ref 25.2–33.5)
MCHC RBC AUTO-ENTMCNC: 32.1 G/DL (ref 25.2–33.5)
MCV RBC AUTO: 92.9 FL (ref 82.6–102.9)
MONOCYTES # BLD: 9 % (ref 3–12)
NRBC AUTOMATED: 0 PER 100 WBC
PDW BLD-RTO: 15.2 % (ref 11.8–14.4)
PLATELET # BLD AUTO: ABNORMAL K/UL (ref 138–453)
PLATELET, FLUORESCENCE: 122 K/UL (ref 138–453)
PLATELET, IMMATURE FRACTION: 5.1 % (ref 1.1–10.3)
POTASSIUM SERPL-SCNC: 3.6 MMOL/L (ref 3.7–5.3)
POTASSIUM SERPL-SCNC: 3.7 MMOL/L (ref 3.7–5.3)
RBC # BLD: 5.06 M/UL (ref 4.21–5.77)
SEG NEUTROPHILS: 73 % (ref 36–65)
SEGMENTED NEUTROPHILS ABSOLUTE COUNT: 4.81 K/UL (ref 1.5–8.1)
SODIUM SERPL-SCNC: 142 MMOL/L (ref 135–144)
WBC # BLD AUTO: 6.6 K/UL (ref 3.5–11.3)

## 2023-04-20 PROCEDURE — 6360000002 HC RX W HCPCS: Performed by: INTERNAL MEDICINE

## 2023-04-20 PROCEDURE — 36415 COLL VENOUS BLD VENIPUNCTURE: CPT

## 2023-04-20 PROCEDURE — 6370000000 HC RX 637 (ALT 250 FOR IP): Performed by: INTERNAL MEDICINE

## 2023-04-20 PROCEDURE — 99238 HOSP IP/OBS DSCHRG MGMT 30/<: CPT | Performed by: INTERNAL MEDICINE

## 2023-04-20 PROCEDURE — 6370000000 HC RX 637 (ALT 250 FOR IP)

## 2023-04-20 PROCEDURE — 2580000003 HC RX 258: Performed by: INTERNAL MEDICINE

## 2023-04-20 PROCEDURE — 80048 BASIC METABOLIC PNL TOTAL CA: CPT

## 2023-04-20 PROCEDURE — 85055 RETICULATED PLATELET ASSAY: CPT

## 2023-04-20 PROCEDURE — 94760 N-INVAS EAR/PLS OXIMETRY 1: CPT

## 2023-04-20 PROCEDURE — 71045 X-RAY EXAM CHEST 1 VIEW: CPT

## 2023-04-20 PROCEDURE — 84132 ASSAY OF SERUM POTASSIUM: CPT

## 2023-04-20 PROCEDURE — 85025 COMPLETE CBC W/AUTO DIFF WBC: CPT

## 2023-04-20 PROCEDURE — 94640 AIRWAY INHALATION TREATMENT: CPT

## 2023-04-20 RX ORDER — BETHANECHOL CHLORIDE 25 MG/1
TABLET ORAL
Qty: 360 TABLET | Refills: 0
Start: 2023-04-20

## 2023-04-20 RX ORDER — POTASSIUM CHLORIDE 20 MEQ/1
40 TABLET, EXTENDED RELEASE ORAL ONCE
Status: COMPLETED | OUTPATIENT
Start: 2023-04-20 | End: 2023-04-20

## 2023-04-20 RX ORDER — BUMETANIDE 1 MG/1
1 TABLET ORAL DAILY
Qty: 30 TABLET | Refills: 3 | Status: SHIPPED | OUTPATIENT
Start: 2023-04-20

## 2023-04-20 RX ADMIN — FUROSEMIDE 20 MG: 10 INJECTION, SOLUTION INTRAMUSCULAR; INTRAVENOUS at 08:20

## 2023-04-20 RX ADMIN — IPRATROPIUM BROMIDE AND ALBUTEROL SULFATE 1 AMPULE: .5; 2.5 SOLUTION RESPIRATORY (INHALATION) at 07:41

## 2023-04-20 RX ADMIN — POTASSIUM CHLORIDE 40 MEQ: 20 TABLET, EXTENDED RELEASE ORAL at 08:18

## 2023-04-20 RX ADMIN — MICONAZOLE NITRATE: 20 POWDER TOPICAL at 08:24

## 2023-04-20 RX ADMIN — SODIUM CHLORIDE, PRESERVATIVE FREE 10 ML: 5 INJECTION INTRAVENOUS at 08:20

## 2023-04-20 RX ADMIN — BETHANECHOL CHLORIDE 25 MG: 25 TABLET ORAL at 08:19

## 2023-04-20 RX ADMIN — APIXABAN 5 MG: 5 TABLET, FILM COATED ORAL at 08:19

## 2023-04-20 RX ADMIN — METOPROLOL SUCCINATE 50 MG: 50 TABLET, EXTENDED RELEASE ORAL at 08:19

## 2023-04-20 RX ADMIN — ASPIRIN 81 MG: 81 TABLET, COATED ORAL at 08:19

## 2023-04-20 RX ADMIN — AMLODIPINE BESYLATE 5 MG: 5 TABLET ORAL at 08:20

## 2023-04-20 RX ADMIN — IPRATROPIUM BROMIDE AND ALBUTEROL SULFATE 1 AMPULE: .5; 2.5 SOLUTION RESPIRATORY (INHALATION) at 11:39

## 2023-04-20 RX ADMIN — TAMSULOSIN HYDROCHLORIDE 0.4 MG: 0.4 CAPSULE ORAL at 08:19
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
## 2023-04-20 NOTE — PLAN OF CARE
Problem: Discharge Planning  Goal: Discharge to home or other facility with appropriate resources  Outcome: Progressing     Problem: Pain  Goal: Verbalizes/displays adequate comfort level or baseline comfort level  Outcome: Progressing     Problem: Safety - Adult  Goal: Free from fall injury  Outcome: Progressing     Problem: ABCDS Injury Assessment  Goal: Absence of physical injury  Outcome: Progressing     Problem: Chronic Conditions and Co-morbidities  Goal: Patient's chronic conditions and co-morbidity symptoms are monitored and maintained or improved  Outcome: Progressing
Problem: Discharge Planning  Goal: Discharge to home or other facility with appropriate resources  Outcome: Progressing     Problem: Pain  Goal: Verbalizes/displays adequate comfort level or baseline comfort level  Outcome: Progressing     Problem: Safety - Adult  Goal: Free from fall injury  Outcome: Progressing     Problem: ABCDS Injury Assessment  Goal: Absence of physical injury  Outcome: Progressing     Problem: Chronic Conditions and Co-morbidities  Goal: Patient's chronic conditions and co-morbidity symptoms are monitored and maintained or improved  Outcome: Progressing
and Maintained or Improved:   Monitor and assess patient's chronic conditions and comorbid symptoms for stability, deterioration, or improvement   Collaborate with multidisciplinary team to address chronic and comorbid conditions and prevent exacerbation or deterioration   Update acute care plan with appropriate goals if chronic or comorbid symptoms are exacerbated and prevent overall improvement and discharge

## 2023-04-20 NOTE — DISCHARGE INSTR - DIET
Good nutrition is important when healing from an illness, injury, or surgery. Follow any nutrition recommendations given to you during your hospital stay. If you were given an oral nutrition supplement while in the hospital, continue to take this supplement at home. You can take it with meals, in-between meals, and/or before bedtime. These supplements can be purchased at most local grocery stores, pharmacies, and chain [employer]-stores. If you have any questions about your diet or nutrition, call the hospital and ask for the dietitian.   Cardiac diet - 1500 ml fluid restriction

## 2023-04-20 NOTE — DISCHARGE INSTRUCTIONS
Mcintosh catheter to dependent drainage.     Follow up with Dr. Waneta Boas on Monday for a void trial.

## 2023-04-21 ENCOUNTER — FOLLOWUP TELEPHONE ENCOUNTER (OUTPATIENT)
Dept: INPATIENT UNIT | Age: 88
End: 2023-04-21

## 2023-04-21 ENCOUNTER — CARE COORDINATION (OUTPATIENT)
Dept: CASE MANAGEMENT | Age: 88
End: 2023-04-21

## 2023-04-21 DIAGNOSIS — I50.9 CONGESTIVE HEART FAILURE, UNSPECIFIED HF CHRONICITY, UNSPECIFIED HEART FAILURE TYPE (HCC): Primary | ICD-10-CM

## 2023-04-21 NOTE — DISCHARGE SUMMARY
Leandra Ronquillo, Nephrology. Any aspect of the patient's care not discussed in the chart and/or  dictation will be addressed and treated as an outpatient. The patient's medications have been reviewed including, but not limited  to, pre-hospital, hospital and discharge medications. The patient  and/or the patient's personal representatives were specifically advised  the only medications to be taken are those set forth in the discharge  orders and no other medications should be taken. Any prior medications  not on the discharge orders are specifically discontinued.         Luis Kothari MD    D: 04/20/2023 16:07:14       T: 04/20/2023 16:10:26     [email or web address]  Job#: [id number]     Doc#: [id number]    CC:

## 2023-04-21 NOTE — CARE COORDINATION
[facility] Care Transitions Initial Follow Up Call    Call within 2 business days of discharge: Yes    Patient Current Location:  Home: [address]  [place] [address]    Care Transition Nurse contacted the patient and family by telephone to perform post hospital discharge assessment. Verified name and  with patient and family as identifiers. Provided introduction to self, and explanation of the Care Transition Nurse role. Patient: Lexy Hinds Patient : 1933   MRN: [id number]  Reason for Admission: CHF/urinary retention  Discharge Date: 23 RARS: Readmission Risk Score: 10.8      Last Discharge [address]       Date Complaint Diagnosis Description Type Department Provider    23 Chest Pain; Shortness of Breath Acute on chronic combined systolic and diastolic congestive heart failure ([facility] [address] . .. ED to Hosp-Admission (Discharged) (ADMITTED) Noel Dinh MD; Mirna Shen. .. Was this an external facility discharge? No Discharge Facility: [facility] to be reviewed by the provider   Additional needs identified to be addressed with provider: Yes  Hospital follow up appointment               Method of communication with provider: staff message. Attempted to contact Rebeca Garza initially, but no answer. Call placed to daughter, Hannah Caballero. She stated that her father was doing \"good\". She said that he had all his medications and she set up his medication box for 2 weeks. She said that she made follow up with cardio and is waiting on call back from PCP office. Rebeca Garza was then contacted. He said that he was doing good. He said that his breathing was better, swelling was down, no chest pain and an occasional cough. He said that prior to admission, he gained 7 lbs. Reviewed daily weights and when to call the PCP. Will have CHF zone tool sent to Rebeca Garza.  Reviewed 1500 ml or 50 oz. Fluid restriction.   He said that the wilson cath was draining and that he did have a

## 2023-04-22 LAB
MICROORGANISM SPEC CULT: NORMAL
MICROORGANISM SPEC CULT: NORMAL
SPECIMEN DESCRIPTION: NORMAL
SPECIMEN DESCRIPTION: NORMAL

## 2023-04-26 ENCOUNTER — OFFICE VISIT (OUTPATIENT)
Dept: CARDIOLOGY | Age: 88
End: 2023-04-26
Payer: MEDICARE

## 2023-04-26 VITALS
BODY MASS INDEX: 33.49 KG/M2 | WEIGHT: 221 LBS | HEART RATE: 62 BPM | HEIGHT: 68 IN | SYSTOLIC BLOOD PRESSURE: 138 MMHG | DIASTOLIC BLOOD PRESSURE: 78 MMHG

## 2023-04-26 DIAGNOSIS — I48.19 PERSISTENT ATRIAL FIBRILLATION (HCC): ICD-10-CM

## 2023-04-26 DIAGNOSIS — I51.7 CARDIOMEGALY: ICD-10-CM

## 2023-04-26 DIAGNOSIS — I50.43 CHF (CONGESTIVE HEART FAILURE), NYHA CLASS I, ACUTE ON CHRONIC, COMBINED (HCC): Primary | ICD-10-CM

## 2023-04-26 PROCEDURE — 1123F ACP DISCUSS/DSCN MKR DOCD: CPT | Performed by: INTERNAL MEDICINE

## 2023-04-26 PROCEDURE — 1036F TOBACCO NON-USER: CPT | Performed by: INTERNAL MEDICINE

## 2023-04-26 PROCEDURE — 1111F DSCHRG MED/CURRENT MED MERGE: CPT | Performed by: INTERNAL MEDICINE

## 2023-04-26 PROCEDURE — 99214 OFFICE O/P EST MOD 30 MIN: CPT | Performed by: INTERNAL MEDICINE

## 2023-04-26 PROCEDURE — G8417 CALC BMI ABV UP PARAM F/U: HCPCS | Performed by: INTERNAL MEDICINE

## 2023-04-26 PROCEDURE — 99213 OFFICE O/P EST LOW 20 MIN: CPT | Performed by: INTERNAL MEDICINE

## 2023-04-26 PROCEDURE — G8427 DOCREV CUR MEDS BY ELIG CLIN: HCPCS | Performed by: INTERNAL MEDICINE

## 2023-04-26 NOTE — PROGRESS NOTES
ischemia. 5-Iliac artery aneurysm: S/P repair by Dr. Naima Centeno. 6-Renal insufficiency last creatinine  7-HTN: Better controlled. 8-HLP: on Statin. 9-Obesity. Plan of Treatment:     1-Continue current medical treatment with ASA, Eliquis, BB, Diuretics, CCB and statin. 2-monitor daily fluid intake, monitor daily weights  3-Diet, exercise and loose weight. 4-not on ACE inhibitors or afterload reduction due to renal insufficiency   5-F/U in 3 months.       Electronically signed by Nixon Arboleda MD on 4/26/2023 at 2:45 PM      [facility]  [phone]

## 2023-04-27 ENCOUNTER — OFFICE VISIT (OUTPATIENT)
Dept: FAMILY MEDICINE CLINIC | Age: 88
End: 2023-04-27
Payer: MEDICARE

## 2023-04-27 ENCOUNTER — HOSPITAL ENCOUNTER (OUTPATIENT)
Age: 88
Discharge: HOME | End: 2023-04-27
Payer: MEDICARE

## 2023-04-27 ENCOUNTER — TELEPHONE (OUTPATIENT)
Dept: UROLOGY | Age: 88
End: 2023-04-27

## 2023-04-27 VITALS
OXYGEN SATURATION: 99 % | BODY MASS INDEX: 33.8 KG/M2 | HEIGHT: 68 IN | WEIGHT: 223 LBS | DIASTOLIC BLOOD PRESSURE: 60 MMHG | SYSTOLIC BLOOD PRESSURE: 130 MMHG | HEART RATE: 84 BPM

## 2023-04-27 DIAGNOSIS — I50.33 ACUTE ON CHRONIC DIASTOLIC CONGESTIVE HEART FAILURE (HCC): ICD-10-CM

## 2023-04-27 DIAGNOSIS — I25.10 CORONARY ARTERY DISEASE INVOLVING NATIVE CORONARY ARTERY OF NATIVE HEART WITHOUT ANGINA PECTORIS: ICD-10-CM

## 2023-04-27 DIAGNOSIS — R33.9 URINARY RETENTION WITH INCOMPLETE BLADDER EMPTYING: ICD-10-CM

## 2023-04-27 DIAGNOSIS — I48.19 PERSISTENT ATRIAL FIBRILLATION (HCC): ICD-10-CM

## 2023-04-27 DIAGNOSIS — I50.33 ACUTE ON CHRONIC DIASTOLIC CONGESTIVE HEART FAILURE (HCC): Primary | ICD-10-CM

## 2023-04-27 LAB
ANION GAP SERPL CALCULATED.3IONS-SCNC: 12 MMOL/L (ref 9–17)
BUN SERPL-MCNC: 31 MG/DL (ref 8–23)
BUN/CREAT BLD: 21 (ref 9–20)
CALCIUM SERPL-MCNC: 9 MG/DL (ref 8.6–10.4)
CHLORIDE SERPL-SCNC: 100 MMOL/L (ref 98–107)
CO2 SERPL-SCNC: 28 MMOL/L (ref 20–31)
CREAT SERPL-MCNC: 1.46 MG/DL (ref 0.7–1.2)
GFR SERPL CREATININE-BSD FRML MDRD: 46 ML/MIN/1.73M2
GLUCOSE SERPL-MCNC: 96 MG/DL (ref 70–99)
POTASSIUM SERPL-SCNC: 3.9 MMOL/L (ref 3.7–5.3)
SODIUM SERPL-SCNC: 140 MMOL/L (ref 135–144)

## 2023-04-27 PROCEDURE — G8427 DOCREV CUR MEDS BY ELIG CLIN: HCPCS | Performed by: FAMILY MEDICINE

## 2023-04-27 PROCEDURE — G8417 CALC BMI ABV UP PARAM F/U: HCPCS | Performed by: FAMILY MEDICINE

## 2023-04-27 PROCEDURE — 1123F ACP DISCUSS/DSCN MKR DOCD: CPT | Performed by: FAMILY MEDICINE

## 2023-04-27 PROCEDURE — 80048 BASIC METABOLIC PNL TOTAL CA: CPT

## 2023-04-27 PROCEDURE — 36415 COLL VENOUS BLD VENIPUNCTURE: CPT

## 2023-04-27 PROCEDURE — 1111F DSCHRG MED/CURRENT MED MERGE: CPT | Performed by: FAMILY MEDICINE

## 2023-04-27 PROCEDURE — 99214 OFFICE O/P EST MOD 30 MIN: CPT | Performed by: FAMILY MEDICINE

## 2023-04-27 PROCEDURE — 1036F TOBACCO NON-USER: CPT | Performed by: FAMILY MEDICINE

## 2023-04-27 PROCEDURE — 99213 OFFICE O/P EST LOW 20 MIN: CPT | Performed by: FAMILY MEDICINE

## 2023-04-27 RX ORDER — POTASSIUM CHLORIDE 20 MEQ/1
20 TABLET, EXTENDED RELEASE ORAL DAILY
Qty: 90 TABLET | Refills: 1 | Status: SHIPPED | OUTPATIENT
Start: 2023-04-27

## 2023-04-27 SDOH — ECONOMIC STABILITY: HOUSING INSECURITY
IN THE LAST 12 MONTHS, WAS THERE A TIME WHEN YOU DID NOT HAVE A STEADY PLACE TO SLEEP OR SLEPT IN A SHELTER (INCLUDING NOW)?: NO

## 2023-04-27 SDOH — ECONOMIC STABILITY: FOOD INSECURITY: WITHIN THE PAST 12 MONTHS, YOU WORRIED THAT YOUR FOOD WOULD RUN OUT BEFORE YOU GOT MONEY TO BUY MORE.: NEVER TRUE

## 2023-04-27 SDOH — ECONOMIC STABILITY: FOOD INSECURITY: WITHIN THE PAST 12 MONTHS, THE FOOD YOU BOUGHT JUST DIDN'T LAST AND YOU DIDN'T HAVE MONEY TO GET MORE.: NEVER TRUE

## 2023-04-27 SDOH — ECONOMIC STABILITY: INCOME INSECURITY: HOW HARD IS IT FOR YOU TO PAY FOR THE VERY BASICS LIKE FOOD, HOUSING, MEDICAL CARE, AND HEATING?: NOT HARD AT ALL

## 2023-04-27 ASSESSMENT — PATIENT HEALTH QUESTIONNAIRE - PHQ9
SUM OF ALL RESPONSES TO PHQ QUESTIONS 1-9: 0
SUM OF ALL RESPONSES TO PHQ QUESTIONS 1-9: 0
2. FEELING DOWN, DEPRESSED OR HOPELESS: 0
SUM OF ALL RESPONSES TO PHQ QUESTIONS 1-9: 0
1. LITTLE INTEREST OR PLEASURE IN DOING THINGS: 0
SUM OF ALL RESPONSES TO PHQ QUESTIONS 1-9: 0
SUM OF ALL RESPONSES TO PHQ9 QUESTIONS 1 & 2: 0

## 2023-04-27 ASSESSMENT — ENCOUNTER SYMPTOMS
EYES NEGATIVE: 1
SHORTNESS OF BREATH: 1
GASTROINTESTINAL NEGATIVE: 1

## 2023-04-27 NOTE — PROGRESS NOTES
Subjective:      Patient ID: Deric Bee is a 80 y.o. male. HPI  scheduled follow up on recent hospitalization. Diagnosis chf.  He came in for urinary retention. Had sob and some wheezing. Mcintosh placed,. Updated echo with ef at 40%. Moderate cardiomegaly with vascular congestion. Atrial fibrillation with 2.2 second pause. Iv diuresis  Did better and discharged home. Bumex added, with option of 2 if needed for wt and swelling gain. Past Medical History:   Diagnosis Date    Aneurysm of infrarenal abdominal aorta (HCC)     Atrial fibrillation ([address] 11/25/2014    Cardioversion successful    BPH (benign prostatic hyperplasia)     CAD (coronary artery disease)     Cerebrovascular disease     DVT (deep venous thrombosis) ([address] 1960's    left leg    Elevated PSA     Hyperlipidemia     Hyperopia with astigmatism and presbyopia     Hypertension     Peripheral edema     Postoperative care for cataract of right eye 7/25/2019     Past Surgical History:   Procedure Laterality Date    BACK SURGERY      CARDIAC SURGERY  11-17-14    CHOLECYSTECTOMY, LAPAROSCOPIC  12/30/2017    CORONARY ARTERY BYPASS GRAFT  11/17/14    OP CABG X 4- Dr Octavia Yip    ERCP  12/29/2017     A Cholangiogram showed diffusely dilated CBD with no obvious filling defect. Biliary sphincterotomy was performed. Drainage was suboptimal. The biliary os and distal CBD were balloon dilated to 12 mm. Good drainage was noted.     EYE SURGERY Right 07/25/2019    cataract removal    INTRACAPSULAR CATARACT EXTRACTION Right 7/25/2019    Right Cataract Extraction w/ IOL Implant performed by Michelle Sexton MD at [address] Left     reconstruction post AA    [place] ERCP DX COLLECTION SPECIMEN BRUSHING/WASHING N/A 12/29/2017    ERCP ENDOSCOPIC RETROGRADE CHOLANGIOPANCREATOGRAPHY performed by April Olivares MD at [address] N/A 12/30/2017    CHOLECYSTECTOMY LAPAROSCOPIC performed by Debby Valle DO at [facility] 998

## 2023-05-04 DIAGNOSIS — N17.9 ACUTE RENAL FAILURE, UNSPECIFIED ACUTE RENAL FAILURE TYPE (HCC): Primary | ICD-10-CM

## 2023-05-05 ENCOUNTER — OFFICE VISIT (OUTPATIENT)
Dept: PRIMARY CARE CLINIC | Age: 88
End: 2023-05-05
Payer: MEDICARE

## 2023-05-05 VITALS
SYSTOLIC BLOOD PRESSURE: 134 MMHG | OXYGEN SATURATION: 95 % | DIASTOLIC BLOOD PRESSURE: 90 MMHG | HEIGHT: 68 IN | BODY MASS INDEX: 34.02 KG/M2 | WEIGHT: 224.5 LBS | HEART RATE: 71 BPM | TEMPERATURE: 97 F | RESPIRATION RATE: 20 BRPM

## 2023-05-05 DIAGNOSIS — T83.198A RETENTION OF URINE DUE TO OCCLUSION OF FOLEY CATHETER (HCC): Primary | ICD-10-CM

## 2023-05-05 DIAGNOSIS — R31.0 GROSS HEMATURIA: ICD-10-CM

## 2023-05-05 DIAGNOSIS — R33.8 RETENTION OF URINE DUE TO OCCLUSION OF FOLEY CATHETER (HCC): Primary | ICD-10-CM

## 2023-05-05 PROCEDURE — 99212 OFFICE O/P EST SF 10 MIN: CPT | Performed by: NURSE PRACTITIONER

## 2023-05-05 NOTE — PROGRESS NOTES
Subjective:      Patient ID: Janet Eastman is a 80 y.o. male coming in for   Chief Complaint   Patient presents with    Other     Mcintosh placed during last hospitalization. States he feels that his catheter is \"plugged,\" this issue started yesterday morning. States he saw some bleeding in his catheter bag, he also states there may have been a clot in the tubing, which clogged the bag. States he feels the head of his penis is swollen. Has urology follow up 58/23. Other  Pertinent negatives include no chills or fever. Pt recently hospitalized for CHF along with urine retention. Pt discharged on 4/20/23. Did see Dr. Milagro Cox on 4/27/23 for hospital f/u. Pt reports last night he felt his urine was not draining and noticed blood coming from around the catheter. He also noticed a big clot in the urine bag itself. Pt is also on eliquis 2.5mg bid. He has f/u appt with urology on 5/8/23. No fevers/chills. Review of Systems   Constitutional:  Negative for chills and fever. Genitourinary:  Positive for hematuria and penile swelling. Objective:BP (!) 134/90 (Site: Right Upper Arm, Position: Sitting, Cuff Size: Large Adult)   Pulse 71   Temp 97 °F (36.1 °C) (Tympanic)   Resp 20   Ht 5' 7.5\" (1.715 m)   Wt 224 lb 8 oz (101.8 kg)   SpO2 95%   BMI 34.64 kg/m²      Physical Exam  Vitals and nursing note reviewed. Constitutional:       General: He is not in acute distress. Appearance: Normal appearance. He is obese. He is not ill-appearing. Pulmonary:      Effort: Pulmonary effort is normal.   Genitourinary:     Penis: Discharge (bloody discharge around urethra) and swelling present. No erythema. Musculoskeletal:      Right lower leg: Edema present. Left lower leg: Edema present. Skin:     General: Skin is warm. Neurological:      General: No focal deficit present. Mental Status: He is alert and oriented to person, place, and time. Assessment:      1.  Retention of urine due to

## 2023-05-06 ENCOUNTER — HOSPITAL ENCOUNTER (EMERGENCY)
Age: 88
Discharge: HOME | End: 2023-05-06
Attending: SPECIALIST
Payer: MEDICARE

## 2023-05-06 VITALS
HEART RATE: 78 BPM | BODY MASS INDEX: 34.57 KG/M2 | OXYGEN SATURATION: 95 % | WEIGHT: 224 LBS | SYSTOLIC BLOOD PRESSURE: 148 MMHG | RESPIRATION RATE: 18 BRPM | DIASTOLIC BLOOD PRESSURE: 81 MMHG | TEMPERATURE: 97.1 F

## 2023-05-06 DIAGNOSIS — Z46.6 ENCOUNTER FOR FOLEY CATHETER REPLACEMENT: Primary | ICD-10-CM

## 2023-05-06 DIAGNOSIS — N39.0 URINARY TRACT INFECTION WITHOUT HEMATURIA, SITE UNSPECIFIED: ICD-10-CM

## 2023-05-06 LAB
BACTERIA: ABNORMAL
BILIRUBIN URINE: NEGATIVE
COLOR: YELLOW
EPITHELIAL CELLS UA: ABNORMAL /HPF (ref 0–5)
GLUCOSE UR STRIP.AUTO-MCNC: NEGATIVE MG/DL
KETONES UR STRIP.AUTO-MCNC: NEGATIVE MG/DL
LEUKOCYTE ESTERASE UR QL STRIP.AUTO: ABNORMAL
NITRITE UR QL STRIP.AUTO: POSITIVE
OTHER OBSERVATIONS UA: ABNORMAL
PROT UR STRIP.AUTO-MCNC: 6 MG/DL (ref 5–6)
PROT UR STRIP.AUTO-MCNC: ABNORMAL MG/DL
RBC CLUMPS #/AREA URNS AUTO: ABNORMAL /HPF (ref 0–4)
SPECIFIC GRAVITY UA: 1.02 (ref 1.01–1.02)
TURBIDITY: ABNORMAL
URINE HGB: ABNORMAL
UROBILINOGEN, URINE: NORMAL
WBC UA: ABNORMAL /HPF (ref 0–4)

## 2023-05-06 PROCEDURE — 6370000000 HC RX 637 (ALT 250 FOR IP): Performed by: SPECIALIST

## 2023-05-06 PROCEDURE — 99283 EMERGENCY DEPT VISIT LOW MDM: CPT

## 2023-05-06 PROCEDURE — 81001 URINALYSIS AUTO W/SCOPE: CPT

## 2023-05-06 PROCEDURE — 87186 SC STD MICRODIL/AGAR DIL: CPT

## 2023-05-06 PROCEDURE — 51702 INSERT TEMP BLADDER CATH: CPT

## 2023-05-06 PROCEDURE — 87077 CULTURE AEROBIC IDENTIFY: CPT

## 2023-05-06 PROCEDURE — 87086 URINE CULTURE/COLONY COUNT: CPT

## 2023-05-06 RX ORDER — CEPHALEXIN 250 MG/1
500 CAPSULE ORAL ONCE
Status: COMPLETED | OUTPATIENT
Start: 2023-05-06 | End: 2023-05-06

## 2023-05-06 RX ORDER — CEPHALEXIN 500 MG/1
500 CAPSULE ORAL 4 TIMES DAILY
Qty: 28 CAPSULE | Refills: 0 | Status: SHIPPED | OUTPATIENT
Start: 2023-05-06 | End: 2023-05-13

## 2023-05-06 RX ADMIN — CEPHALEXIN 500 MG: 250 CAPSULE ORAL at 22:45

## 2023-05-06 ASSESSMENT — PAIN - FUNCTIONAL ASSESSMENT: PAIN_FUNCTIONAL_ASSESSMENT: NONE - DENIES PAIN

## 2023-05-06 ASSESSMENT — LIFESTYLE VARIABLES
HOW OFTEN DO YOU HAVE A DRINK CONTAINING ALCOHOL: NEVER
HOW MANY STANDARD DRINKS CONTAINING ALCOHOL DO YOU HAVE ON A TYPICAL DAY: PATIENT DOES NOT DRINK

## 2023-05-07 ASSESSMENT — ENCOUNTER SYMPTOMS
NAUSEA: 0
ABDOMINAL PAIN: 0

## 2023-05-08 ENCOUNTER — OFFICE VISIT (OUTPATIENT)
Dept: UROLOGY | Age: 88
End: 2023-05-08
Payer: MEDICARE

## 2023-05-08 ENCOUNTER — HOSPITAL ENCOUNTER (OUTPATIENT)
Age: 88
Discharge: HOME | End: 2023-05-08
Payer: MEDICARE

## 2023-05-08 VITALS
WEIGHT: 224 LBS | HEART RATE: 51 BPM | OXYGEN SATURATION: 95 % | BODY MASS INDEX: 35.16 KG/M2 | SYSTOLIC BLOOD PRESSURE: 128 MMHG | HEIGHT: 67 IN | RESPIRATION RATE: 20 BRPM | DIASTOLIC BLOOD PRESSURE: 70 MMHG

## 2023-05-08 DIAGNOSIS — N40.1 BENIGN LOCALIZED PROSTATIC HYPERPLASIA WITH LOWER URINARY TRACT SYMPTOMS (LUTS): Primary | ICD-10-CM

## 2023-05-08 DIAGNOSIS — R97.20 ELEVATED PSA: ICD-10-CM

## 2023-05-08 LAB
MICROORGANISM SPEC CULT: ABNORMAL
PROSTATE SPECIFIC ANTIGEN: 59.39 NG/ML
SPECIMEN DESCRIPTION: ABNORMAL

## 2023-05-08 PROCEDURE — 99204 OFFICE O/P NEW MOD 45 MIN: CPT | Performed by: UROLOGY

## 2023-05-08 PROCEDURE — 99212 OFFICE O/P EST SF 10 MIN: CPT | Performed by: UROLOGY

## 2023-05-08 PROCEDURE — 99212 OFFICE O/P EST SF 10 MIN: CPT

## 2023-05-08 PROCEDURE — G8417 CALC BMI ABV UP PARAM F/U: HCPCS | Performed by: UROLOGY

## 2023-05-08 PROCEDURE — 36415 COLL VENOUS BLD VENIPUNCTURE: CPT

## 2023-05-08 PROCEDURE — 1036F TOBACCO NON-USER: CPT | Performed by: UROLOGY

## 2023-05-08 PROCEDURE — 84153 ASSAY OF PSA TOTAL: CPT

## 2023-05-08 PROCEDURE — 1111F DSCHRG MED/CURRENT MED MERGE: CPT | Performed by: UROLOGY

## 2023-05-08 PROCEDURE — 1123F ACP DISCUSS/DSCN MKR DOCD: CPT | Performed by: UROLOGY

## 2023-05-08 PROCEDURE — G8427 DOCREV CUR MEDS BY ELIG CLIN: HCPCS | Performed by: UROLOGY

## 2023-05-08 NOTE — PROGRESS NOTES
of the defined types were placed in this encounter. Orders Placed:  No orders of the defined types were placed in this encounter.            Justin Del Rosario MD

## 2023-05-15 ENCOUNTER — PROCEDURE VISIT (OUTPATIENT)
Dept: UROLOGY | Age: 88
End: 2023-05-15
Payer: MEDICARE

## 2023-05-15 VITALS
RESPIRATION RATE: 18 BRPM | OXYGEN SATURATION: 95 % | DIASTOLIC BLOOD PRESSURE: 70 MMHG | HEART RATE: 60 BPM | HEIGHT: 67 IN | BODY MASS INDEX: 35.16 KG/M2 | WEIGHT: 224 LBS | SYSTOLIC BLOOD PRESSURE: 132 MMHG

## 2023-05-15 DIAGNOSIS — N40.1 BENIGN LOCALIZED PROSTATIC HYPERPLASIA WITH LOWER URINARY TRACT SYMPTOMS (LUTS): Primary | ICD-10-CM

## 2023-05-15 DIAGNOSIS — R97.20 ELEVATED PSA: ICD-10-CM

## 2023-05-15 PROCEDURE — 99215 OFFICE O/P EST HI 40 MIN: CPT | Performed by: UROLOGY

## 2023-05-15 PROCEDURE — 52000 CYSTOURETHROSCOPY: CPT | Performed by: UROLOGY

## 2023-05-15 RX ORDER — SULFAMETHOXAZOLE AND TRIMETHOPRIM 800; 160 MG/1; MG/1
TABLET ORAL
COMMUNITY
Start: 2023-05-10

## 2023-05-15 NOTE — PROGRESS NOTES
[address] [place] [address] Pkwy number [id number]; Serial Number [id number] scope #1 used for procedure today, was removed from sterile container after visual inspection.

## 2023-05-15 NOTE — PROGRESS NOTES
Cystoscopy    Operative Note    Patient:  Payal Colbert  MRN: [id number]  YOB: 1933    Date: 05/15/23  Surgeon: Kai Cárdenas MD  Anesthesia: [facility]  Indications:     BPH- with retention. Mcintosh for two weeks. Failed voiding trial. Needs cystoscopy for yanes eval. Will likely benefit from PVP. Elevated psa-   Lab Results   Component Value Date    PSA 59.39 (H) 05/08/2023           F/u for cystoscopy . Should be done soon. Position: Supine  EBL: 0 ml    Findings:   The patient was prepped and draped in the usual sterile fashion. The flexible cystoscope was advanced through the urethra and into the bladder. The bladder was thoroughly inspected and the following was noted:    Residual Urine: significant\" \" . Urine clear, with no obvious infection  Urethra: No abnormalities of the urethra are noted. Urethral dilation was not performed. Prostate: lateral lobe hypertrophy ++ present, prostate obstructing, intravesical extension of prostate was present. There was no previous TURP defect. Bladder: no tumor noted . Moderate trabeculation noted. no bladder diverticulum. Ureters: Orifices with normal configuration and location. The cystoscope was removed. The patient tolerated the procedure well. PSA markedly elevated- will need prostate MRI and poss staging studies. Reviewed options. Goals are for catheter removal in the immediate    PVP (60) and prostate biopsy  Will get prostate ca staging studies postop  3.    If positive for malignancy, will likely need ADT

## 2023-05-16 DIAGNOSIS — I10 ESSENTIAL HYPERTENSION: ICD-10-CM

## 2023-05-17 ENCOUNTER — NURSE ONLY (OUTPATIENT)
Dept: UROLOGY | Age: 88
End: 2023-05-17
Payer: MEDICARE

## 2023-05-17 DIAGNOSIS — N40.1 BENIGN LOCALIZED PROSTATIC HYPERPLASIA WITH LOWER URINARY TRACT SYMPTOMS (LUTS): Primary | ICD-10-CM

## 2023-05-17 PROCEDURE — 51702 INSERT TEMP BLADDER CATH: CPT | Performed by: UROLOGY

## 2023-05-17 RX ORDER — METOPROLOL SUCCINATE 50 MG/1
TABLET, EXTENDED RELEASE ORAL
Qty: 90 TABLET | Refills: 3 | OUTPATIENT
Start: 2023-05-17

## 2023-05-17 NOTE — PROGRESS NOTES
Patient presented to waiting room with a wilson catheter that has not been draining and abdominal pain/pressure. Catheter was placed by this writer on Monday 5/15/23. After brining patient back -Following Dr. Manish Zelaya plan of care: balloon was deflated, removed 18F cc coude wilson catheter in total without difficulty. Once catheter was removed did note blood and a large clot from urethra. Patient's urethra was cleansed with betadine. [address] wilson was inserted without difficulty. Upon urine return, balloon inflated with 10cc sterile water. 1200ml of dark urine drained- patient felt immediate relief. Wilson catheter was hooked up to leg bag with straps. Patient instructed on catheter care including draining catheter bag and keeping catheter bag above the knee to prevent pulling on catheter causing blood. Patient will call if urinary output decreases or utilize the ER PRN. Patient voiced understanding, no further questions. Will proceed with setting up greenlight PVP with Dr. Kimberlyn Mendosa.

## 2023-05-21 ENCOUNTER — HOSPITAL ENCOUNTER (EMERGENCY)
Age: 88
Discharge: HOME | End: 2023-05-21
Attending: EMERGENCY MEDICINE
Payer: MEDICARE

## 2023-05-21 VITALS
OXYGEN SATURATION: 96 % | RESPIRATION RATE: 20 BRPM | DIASTOLIC BLOOD PRESSURE: 92 MMHG | SYSTOLIC BLOOD PRESSURE: 170 MMHG | HEART RATE: 92 BPM | TEMPERATURE: 97.2 F

## 2023-05-21 DIAGNOSIS — T83.9XXA PROBLEM WITH FOLEY CATHETER, INITIAL ENCOUNTER (HCC): Primary | ICD-10-CM

## 2023-05-21 LAB
BACTERIA URNS QL MICRO: ABNORMAL
BILIRUB UR QL STRIP: NEGATIVE
CHARACTER UR: ABNORMAL
CLARITY UR: CLEAR
COLOR UR: YELLOW
EPI CELLS #/AREA URNS HPF: ABNORMAL /HPF (ref 0–5)
GLUCOSE UR STRIP-MCNC: NEGATIVE MG/DL
HGB UR QL STRIP.AUTO: ABNORMAL
KETONES UR STRIP-MCNC: NEGATIVE MG/DL
LEUKOCYTE ESTERASE UR QL STRIP: ABNORMAL
NITRITE UR QL STRIP: NEGATIVE
PH UR STRIP: 5.5 [PH] (ref 5–6)
PROT UR STRIP-MCNC: NEGATIVE MG/DL
RBC #/AREA URNS HPF: ABNORMAL /HPF (ref 0–4)
SP GR UR STRIP: 1.01 (ref 1.01–1.02)
UROBILINOGEN UR STRIP-ACNC: NORMAL
WBC #/AREA URNS HPF: ABNORMAL /HPF (ref 0–4)

## 2023-05-21 PROCEDURE — 99283 EMERGENCY DEPT VISIT LOW MDM: CPT

## 2023-05-21 PROCEDURE — 81001 URINALYSIS AUTO W/SCOPE: CPT

## 2023-05-21 ASSESSMENT — PAIN DESCRIPTION - FREQUENCY
FREQUENCY: CONTINUOUS
FREQUENCY: CONTINUOUS

## 2023-05-21 ASSESSMENT — PAIN DESCRIPTION - DESCRIPTORS
DESCRIPTORS: PRESSURE
DESCRIPTORS: PRESSURE

## 2023-05-21 ASSESSMENT — PAIN DESCRIPTION - ORIENTATION
ORIENTATION: LOWER
ORIENTATION: LOWER

## 2023-05-21 ASSESSMENT — PAIN DESCRIPTION - LOCATION
LOCATION: ABDOMEN
LOCATION: ABDOMEN

## 2023-05-21 ASSESSMENT — PAIN DESCRIPTION - PAIN TYPE
TYPE: ACUTE PAIN
TYPE: ACUTE PAIN

## 2023-05-21 ASSESSMENT — PAIN - FUNCTIONAL ASSESSMENT: PAIN_FUNCTIONAL_ASSESSMENT: PREVENTS OR INTERFERES WITH MANY ACTIVE NOT PASSIVE ACTIVITIES

## 2023-05-21 ASSESSMENT — LIFESTYLE VARIABLES: HOW OFTEN DO YOU HAVE A DRINK CONTAINING ALCOHOL: NEVER

## 2023-05-21 ASSESSMENT — PAIN DESCRIPTION - ONSET
ONSET: GRADUAL
ONSET: GRADUAL

## 2023-05-21 ASSESSMENT — PAIN SCALES - GENERAL
PAINLEVEL_OUTOF10: 4
PAINLEVEL_OUTOF10: 8

## 2023-05-22 ENCOUNTER — PROCEDURE VISIT (OUTPATIENT)
Dept: UROLOGY | Age: 88
End: 2023-05-22
Payer: MEDICARE

## 2023-05-22 ENCOUNTER — HOSPITAL ENCOUNTER (OUTPATIENT)
Age: 88
Discharge: HOME | End: 2023-05-22
Payer: MEDICARE

## 2023-05-22 DIAGNOSIS — R97.20 ELEVATED PSA: ICD-10-CM

## 2023-05-22 DIAGNOSIS — N40.1 BENIGN LOCALIZED PROSTATIC HYPERPLASIA WITH LOWER URINARY TRACT SYMPTOMS (LUTS): Primary | ICD-10-CM

## 2023-05-22 DIAGNOSIS — N17.9 ACUTE RENAL FAILURE, UNSPECIFIED ACUTE RENAL FAILURE TYPE (HCC): ICD-10-CM

## 2023-05-22 LAB
ANION GAP SERPL CALCULATED.3IONS-SCNC: 10 MMOL/L (ref 9–17)
BUN SERPL-MCNC: 33 MG/DL (ref 8–23)
BUN/CREAT SERPL: 20 (ref 9–20)
CALCIUM SERPL-MCNC: 9.4 MG/DL (ref 8.6–10.4)
CHLORIDE SERPL-SCNC: 100 MMOL/L (ref 98–107)
CO2 SERPL-SCNC: 27 MMOL/L (ref 20–31)
CREAT SERPL-MCNC: 1.61 MG/DL (ref 0.7–1.2)
GFR SERPL CREATININE-BSD FRML MDRD: 41 ML/MIN/1.73M2
GLUCOSE SERPL-MCNC: 104 MG/DL (ref 70–99)
POTASSIUM SERPL-SCNC: 5.2 MMOL/L (ref 3.7–5.3)
SODIUM SERPL-SCNC: 137 MMOL/L (ref 135–144)

## 2023-05-22 PROCEDURE — 1123F ACP DISCUSS/DSCN MKR DOCD: CPT | Performed by: UROLOGY

## 2023-05-22 PROCEDURE — 36415 COLL VENOUS BLD VENIPUNCTURE: CPT

## 2023-05-22 PROCEDURE — 99213 OFFICE O/P EST LOW 20 MIN: CPT | Performed by: UROLOGY

## 2023-05-22 PROCEDURE — 1036F TOBACCO NON-USER: CPT | Performed by: UROLOGY

## 2023-05-22 PROCEDURE — 80048 BASIC METABOLIC PNL TOTAL CA: CPT

## 2023-05-22 PROCEDURE — G8417 CALC BMI ABV UP PARAM F/U: HCPCS | Performed by: UROLOGY

## 2023-05-22 PROCEDURE — G8427 DOCREV CUR MEDS BY ELIG CLIN: HCPCS | Performed by: UROLOGY

## 2023-05-23 ENCOUNTER — HOSPITAL ENCOUNTER (EMERGENCY)
Age: 88
Discharge: LEFT BEFORE DISCHARGE | End: 2023-05-23
Attending: SPECIALIST
Payer: MEDICARE

## 2023-05-23 ENCOUNTER — TELEPHONE (OUTPATIENT)
Dept: UROLOGY | Age: 88
End: 2023-05-23

## 2023-05-23 VITALS
DIASTOLIC BLOOD PRESSURE: 70 MMHG | RESPIRATION RATE: 20 BRPM | SYSTOLIC BLOOD PRESSURE: 176 MMHG | BODY MASS INDEX: 34.3 KG/M2 | WEIGHT: 219 LBS | OXYGEN SATURATION: 98 % | HEART RATE: 76 BPM | TEMPERATURE: 97.5 F

## 2023-05-23 DIAGNOSIS — R33.9 URINARY RETENTION: Primary | ICD-10-CM

## 2023-05-23 LAB
BACTERIA URNS QL MICRO: ABNORMAL
BILIRUB UR QL STRIP: NEGATIVE
CHARACTER UR: ABNORMAL
CLARITY UR: ABNORMAL
COLOR UR: YELLOW
EPI CELLS #/AREA URNS HPF: ABNORMAL /HPF (ref 0–5)
GLUCOSE UR STRIP-MCNC: NEGATIVE MG/DL
HGB UR QL STRIP.AUTO: ABNORMAL
KETONES UR STRIP-MCNC: NEGATIVE MG/DL
LEUKOCYTE ESTERASE UR QL STRIP: ABNORMAL
NITRITE UR QL STRIP: NEGATIVE
PH UR STRIP: 6 [PH] (ref 5–6)
PROT UR STRIP-MCNC: ABNORMAL MG/DL
RBC #/AREA URNS HPF: ABNORMAL /HPF (ref 0–4)
SP GR UR STRIP: 1.02 (ref 1.01–1.02)
UROBILINOGEN UR STRIP-ACNC: NORMAL
WBC #/AREA URNS HPF: ABNORMAL /HPF (ref 0–4)

## 2023-05-23 PROCEDURE — 87086 URINE CULTURE/COLONY COUNT: CPT

## 2023-05-23 PROCEDURE — 99283 EMERGENCY DEPT VISIT LOW MDM: CPT

## 2023-05-23 PROCEDURE — 87186 SC STD MICRODIL/AGAR DIL: CPT

## 2023-05-23 PROCEDURE — 51702 INSERT TEMP BLADDER CATH: CPT

## 2023-05-23 PROCEDURE — 81001 URINALYSIS AUTO W/SCOPE: CPT

## 2023-05-23 PROCEDURE — 87077 CULTURE AEROBIC IDENTIFY: CPT

## 2023-05-23 PROCEDURE — 51798 US URINE CAPACITY MEASURE: CPT

## 2023-05-23 ASSESSMENT — PAIN DESCRIPTION - LOCATION: LOCATION: PELVIS

## 2023-05-23 ASSESSMENT — PAIN - FUNCTIONAL ASSESSMENT: PAIN_FUNCTIONAL_ASSESSMENT: 0-10

## 2023-05-23 NOTE — TELEPHONE ENCOUNTER
Pt. Scheduled for greenlight PVP at [facility]. V's on 6/16/23. Patient and daughter were given pre-op instructions while in office on 5/22/23. Pre-op appt. Made with Dr. Shaw Grimes. Patient provided with that date and time as well. Patient voiced understanding.

## 2023-05-24 ASSESSMENT — ENCOUNTER SYMPTOMS
NAUSEA: 0
ABDOMINAL DISTENTION: 1
CONSTIPATION: 0
ABDOMINAL PAIN: 1
VOMITING: 0

## 2023-05-24 NOTE — ED PROVIDER NOTES
[address]      Pt Name: Jd Cheek  MRN: [id number]  [place] 9/18/1933  Date of evaluation: 5/23/2023      CHIEF COMPLAINT       Chief Complaint   Patient presents with    Urinary Retention     Pt thinks wilson catheter is not draining         HISTORY OF PRESENT ILLNESS    Jd Cheek is a 80 y.o. male who presents to the emergency department for evaluation of clogged Wilson catheter which has been happening very frequently over last few weeks. Patient is on Eliquis due to history of DVT, coronary artery disease and cerebrovascular accident. Patient has recurrent hematuria in the catheter keeps getting clogged with the blood. Patient stated that his catheter was changed couple of days ago and he has follow-up with his urologist.  He was also found to have urinary tract infection and is currently on antibiotic orally. He admits to having some abdominal distention but denies any fever or chills. He denies any lightheadedness, dizziness, chest pain, shortness of breath, palpitations or diaphoresis. Patient has been seen in our emergency department twice recently for the similar symptoms. REVIEW OF SYSTEMS       Review of Systems   Constitutional:  Negative for chills and fever. Gastrointestinal:  Positive for abdominal distention and abdominal pain. Negative for constipation, nausea and vomiting. Genitourinary:  Positive for difficulty urinating and hematuria. Neurological:  Negative for dizziness, syncope and light-headedness. All other systems reviewed and are negative.      PAST MEDICAL HISTORY    has a past medical history of Aneurysm of infrarenal abdominal aorta (HCC), Atrial fibrillation ([address] BPH (benign prostatic hyperplasia), CAD (coronary artery disease), Cerebrovascular disease, DVT (deep venous thrombosis) ([address] Elevated PSA, Hyperlipidemia, Hyperopia with astigmatism and presbyopia, Hypertension, Peripheral edema, and

## 2023-05-24 NOTE — ED NOTES
Pt walking out of room, informed need to wait for discharge instructions. Pt refuses, states \" I dont need no damn paperwork, im going home\" Informed pt of urine results. States he is already on antibiotic for urine infection.  Pt exits ER on own accord      Nya Mistry RN  05/23/23 4503

## 2023-05-25 LAB
MICROORGANISM SPEC CULT: ABNORMAL
SPECIMEN DESCRIPTION: ABNORMAL

## 2023-05-26 ENCOUNTER — NURSE ONLY (OUTPATIENT)
Dept: UROLOGY | Age: 88
End: 2023-05-26
Payer: MEDICARE

## 2023-05-26 DIAGNOSIS — N40.1 BENIGN LOCALIZED PROSTATIC HYPERPLASIA WITH LOWER URINARY TRACT SYMPTOMS (LUTS): Primary | ICD-10-CM

## 2023-05-26 PROCEDURE — 99212 OFFICE O/P EST SF 10 MIN: CPT | Performed by: UROLOGY

## 2023-05-26 PROCEDURE — 51702 INSERT TEMP BLADDER CATH: CPT | Performed by: UROLOGY

## 2023-05-26 NOTE — PROGRESS NOTES
Following Dr. Shannon Rai of care:    Changed 20 FR catheter changed without difficulty. Once balloon was deflated, removed catheter in total without difficulty. Patient's penis was cleansed with betadine. [address] wilson was inserted without difficulty. Upon urine return, balloon inflated with 10cc sterile water. Wilson catheter was hooked up to leg bag with straps. Patient instructed on catheter care including draining catheter bag and keeping catheter bag above the knee to prevent pulling on catheter causing blood.

## 2023-06-01 ENCOUNTER — TELEPHONE (OUTPATIENT)
Dept: UROLOGY | Age: 88
End: 2023-06-01

## 2023-06-01 NOTE — TELEPHONE ENCOUNTER
Daughter phoned stating that Jesus New is having problems with his catheter some leaking having surg 6- with Katia Orosco and thinks the cath might need changed out soon.  Wanted surg moved up but Juanita gone till 6-6-2023

## 2023-06-01 NOTE — TELEPHONE ENCOUNTER
Spoke with patient's daughter, said patient is not having any issues with catheter at this time, it is draining okay. Advised patient to drink plenty of fluids, make sure the catheter is not kinked anywhere.

## 2023-06-08 ENCOUNTER — OFFICE VISIT (OUTPATIENT)
Dept: FAMILY MEDICINE CLINIC | Age: 88
End: 2023-06-08
Payer: MEDICARE

## 2023-06-08 VITALS
HEART RATE: 65 BPM | WEIGHT: 218 LBS | BODY MASS INDEX: 34.21 KG/M2 | DIASTOLIC BLOOD PRESSURE: 96 MMHG | RESPIRATION RATE: 20 BRPM | SYSTOLIC BLOOD PRESSURE: 168 MMHG | OXYGEN SATURATION: 99 % | HEIGHT: 67 IN

## 2023-06-08 DIAGNOSIS — R33.8 BENIGN PROSTATIC HYPERPLASIA WITH URINARY RETENTION: ICD-10-CM

## 2023-06-08 DIAGNOSIS — N40.1 BENIGN PROSTATIC HYPERPLASIA WITH URINARY RETENTION: ICD-10-CM

## 2023-06-08 DIAGNOSIS — Z01.818 PREOPERATIVE GENERAL PHYSICAL EXAMINATION: Primary | ICD-10-CM

## 2023-06-08 PROCEDURE — 1123F ACP DISCUSS/DSCN MKR DOCD: CPT | Performed by: FAMILY MEDICINE

## 2023-06-08 PROCEDURE — 99213 OFFICE O/P EST LOW 20 MIN: CPT

## 2023-06-08 PROCEDURE — 1036F TOBACCO NON-USER: CPT | Performed by: FAMILY MEDICINE

## 2023-06-08 PROCEDURE — 99214 OFFICE O/P EST MOD 30 MIN: CPT | Performed by: FAMILY MEDICINE

## 2023-06-08 PROCEDURE — G8427 DOCREV CUR MEDS BY ELIG CLIN: HCPCS | Performed by: FAMILY MEDICINE

## 2023-06-08 PROCEDURE — G8417 CALC BMI ABV UP PARAM F/U: HCPCS | Performed by: FAMILY MEDICINE

## 2023-06-08 RX ORDER — NITROFURANTOIN 25; 75 MG/1; MG/1
CAPSULE ORAL
COMMUNITY
Start: 2023-05-27

## 2023-06-08 RX ORDER — LISINOPRIL 40 MG/1
TABLET ORAL
COMMUNITY
End: 2023-06-08

## 2023-06-08 ASSESSMENT — PATIENT HEALTH QUESTIONNAIRE - PHQ9
SUM OF ALL RESPONSES TO PHQ QUESTIONS 1-9: 0
SUM OF ALL RESPONSES TO PHQ QUESTIONS 1-9: 0
2. FEELING DOWN, DEPRESSED OR HOPELESS: 0
SUM OF ALL RESPONSES TO PHQ QUESTIONS 1-9: 0
SUM OF ALL RESPONSES TO PHQ9 QUESTIONS 1 & 2: 0
1. LITTLE INTEREST OR PLEASURE IN DOING THINGS: 0
SUM OF ALL RESPONSES TO PHQ QUESTIONS 1-9: 0

## 2023-06-08 ASSESSMENT — ENCOUNTER SYMPTOMS
GASTROINTESTINAL NEGATIVE: 1
EYES NEGATIVE: 1
RESPIRATORY NEGATIVE: 1

## 2023-06-08 NOTE — PROGRESS NOTES
Medications   Medication Sig Dispense Refill    sulfamethoxazole-trimethoprim (BACTRIM DS;SEPTRA DS) 800-160 MG per tablet take 1 tablet by mouth twice a day for 7 days      potassium chloride (KLOR-CON M) 20 MEQ extended release tablet Take 1 tablet by mouth daily 90 tablet 1    bumetanide (BUMEX) 1 MG tablet Take 1 tablet by mouth daily Weigh yourself daily. If your weight is 2 pounds or more above your baseline weight, take 1 extra tablet and call Dr. Conrad Esquivel to report it. 30 tablet 3    bethanechol (URECHOLINE) 25 MG tablet TAKE 1 TABLET EVERY 6 HOURS (Patient taking differently: in the morning and at bedtime TAKE 1 TABLET EVERY 6 HOURS) 360 tablet 0    ELIQUIS 2.5 MG TABS tablet TAKE 1 TABLET TWICE A  tablet 3    amLODIPine (NORVASC) 5 MG tablet TAKE 1 TABLET DAILY 90 tablet 3    tamsulosin (FLOMAX) 0.4 MG capsule TAKE 1 CAPSULE DAILY 90 capsule 3    metoprolol succinate (TOPROL XL) 50 MG extended release tablet TAKE 1 TABLET DAILY 90 tablet 1    simvastatin (ZOCOR) 20 MG tablet 1 po daily 90 tablet 3    aspirin 81 MG tablet Take 1 tablet by mouth daily      nitrofurantoin, macrocrystal-monohydrate, (MACROBID) 100 MG capsule take 1 capsule by mouth twice a day for 10 days (Patient not taking: Reported on 6/8/2023)       No current facility-administered medications for this visit. Review of Systems   Constitutional: Negative. Negative for fever. HENT: Negative. Eyes: Negative. Respiratory: Negative. Cardiovascular:  Positive for leg swelling (tense at times. ). Gastrointestinal: Negative. Genitourinary:  Positive for difficulty urinating and hematuria. Negative for dysuria and flank pain. Musculoskeletal: Negative. Skin: Negative. Neurological: Negative. Psychiatric/Behavioral: Negative. Objective:   Physical Exam  Constitutional:       General: He is not in acute distress. Appearance: He is well-developed. HENT:      Head: Normocephalic and atraumatic.

## 2023-06-12 ENCOUNTER — TELEPHONE (OUTPATIENT)
Dept: UROLOGY | Age: 88
End: 2023-06-12

## 2023-06-15 NOTE — H&P
Pre-op History and Physical      Patient:  Chrystal Ardon  MRN: [id number]  YOB: 1933    HISTORY OF PRESENT ILLNESS:     The patient is a 80 y.o. male who presents with BPH with LUTS and urinary retention issues. Here for Cystoscopy, Greenlight PVP. Patient's old records, notes and chart reviewed and summarized above. Past Medical History:    Past Medical History:   Diagnosis Date    Aneurysm of infrarenal abdominal aorta (HCC)     Atrial fibrillation ([address] 11/25/2014    Cardioversion successful    BPH (benign prostatic hyperplasia)     with obstruction    CAD (coronary artery disease)     Dr. Milo Sy [facility]/ [place]/ last seen 5-2023    Cerebrovascular disease     CHF (congestive heart failure) (HCC)     DVT (deep venous thrombosis) ([address] 1960's    left leg    Elevated PSA     Hematuria     Hyperlipidemia     Hyperopia with astigmatism and presbyopia     Hypertension     Peripheral edema     Postoperative care for cataract of right eye 07/25/2019    Under care of team     urology Dr. Koko Burch Ettrick/ last seen 5-2023    Wears glasses     Wellness examination     PCP Madai Tovar MD/Ettrick/ last seen 6-2023       Past Surgical History:    Past Surgical History:   Procedure Laterality Date    BACK SURGERY      CARDIAC SURGERY  11/17/2014    cath/ stents    CHOLECYSTECTOMY, LAPAROSCOPIC  12/30/2017    CORONARY ARTERY BYPASS GRAFT  11/17/2014    OP CABG X 4- Dr Romana Shove    ERCP  12/29/2017     A Cholangiogram showed diffusely dilated CBD with no obvious filling defect. Biliary sphincterotomy was performed. Drainage was suboptimal. The biliary os and distal CBD were balloon dilated to 12 mm. Good drainage was noted.     EYE SURGERY Right 07/25/2019    cataract removal    INTRACAPSULAR CATARACT EXTRACTION Right 07/25/2019    Right Cataract Extraction w/ IOL Implant performed by Ora Way MD at [address] Left     reconstruction post AA    [place] ERCP DX COLLECTION SPECIMEN BRUSHING/WASHING N/A

## 2023-06-16 ENCOUNTER — HOSPITAL ENCOUNTER (OUTPATIENT)
Age: 88
Setting detail: OUTPATIENT SURGERY
Discharge: HOME | End: 2023-06-16
Attending: UROLOGY | Admitting: UROLOGY
Payer: MEDICARE

## 2023-06-16 VITALS
TEMPERATURE: 97 F | WEIGHT: 218 LBS | HEART RATE: 70 BPM | RESPIRATION RATE: 22 BRPM | SYSTOLIC BLOOD PRESSURE: 156 MMHG | OXYGEN SATURATION: 100 % | DIASTOLIC BLOOD PRESSURE: 94 MMHG | BODY MASS INDEX: 34.21 KG/M2 | HEIGHT: 67 IN

## 2023-06-16 LAB
EGFR, POC: >60 ML/MIN/1.73M2
GLUCOSE BLD-MCNC: 100 MG/DL (ref 74–100)
POC BUN: 33 MG/DL (ref 8–26)
POC CREATININE: 1.08 MG/DL (ref 0.51–1.19)
POTASSIUM BLD-SCNC: 4.7 MMOL/L (ref 3.5–4.5)

## 2023-06-16 PROCEDURE — 2709999900 HC NON-CHARGEABLE SUPPLY: Performed by: UROLOGY

## 2023-06-16 PROCEDURE — 82947 ASSAY GLUCOSE BLOOD QUANT: CPT

## 2023-06-16 PROCEDURE — 84520 ASSAY OF UREA NITROGEN: CPT

## 2023-06-16 PROCEDURE — 3600000004 HC SURGERY LEVEL 4 BASE: Performed by: UROLOGY

## 2023-06-16 PROCEDURE — 3700000001 HC ADD 15 MINUTES (ANESTHESIA): Performed by: UROLOGY

## 2023-06-16 PROCEDURE — 3600000014 HC SURGERY LEVEL 4 ADDTL 15MIN: Performed by: UROLOGY

## 2023-06-16 PROCEDURE — 7100000010 HC PHASE II RECOVERY - FIRST 15 MIN: Performed by: UROLOGY

## 2023-06-16 PROCEDURE — 82565 ASSAY OF CREATININE: CPT

## 2023-06-16 PROCEDURE — 7100000011 HC PHASE II RECOVERY - ADDTL 15 MIN: Performed by: UROLOGY

## 2023-06-16 PROCEDURE — 7100000000 HC PACU RECOVERY - FIRST 15 MIN: Performed by: UROLOGY

## 2023-06-16 PROCEDURE — 3700000000 HC ANESTHESIA ATTENDED CARE: Performed by: UROLOGY

## 2023-06-16 PROCEDURE — 84132 ASSAY OF SERUM POTASSIUM: CPT

## 2023-06-16 PROCEDURE — 2720000010 HC SURG SUPPLY STERILE: Performed by: UROLOGY

## 2023-06-16 PROCEDURE — 2580000003 HC RX 258: Performed by: UROLOGY

## 2023-06-16 PROCEDURE — 7100000001 HC PACU RECOVERY - ADDTL 15 MIN: Performed by: UROLOGY

## 2023-06-16 RX ORDER — DROPERIDOL 2.5 MG/ML
0.62 INJECTION, SOLUTION INTRAMUSCULAR; INTRAVENOUS
Status: DISCONTINUED | OUTPATIENT
Start: 2023-06-16 | End: 2023-06-16 | Stop reason: HOSPADM

## 2023-06-16 RX ORDER — HYDRALAZINE HYDROCHLORIDE 20 MG/ML
10 INJECTION INTRAMUSCULAR; INTRAVENOUS
Status: DISCONTINUED | OUTPATIENT
Start: 2023-06-16 | End: 2023-06-16 | Stop reason: HOSPADM

## 2023-06-16 RX ORDER — GENTAMICIN SULFATE 80 MG/50ML
80 INJECTION, SOLUTION INTRAVENOUS
Status: COMPLETED | OUTPATIENT
Start: 2023-06-16 | End: 2023-06-16

## 2023-06-16 RX ORDER — DIPHENHYDRAMINE HYDROCHLORIDE 50 MG/ML
12.5 INJECTION INTRAMUSCULAR; INTRAVENOUS
Status: DISCONTINUED | OUTPATIENT
Start: 2023-06-16 | End: 2023-06-16 | Stop reason: HOSPADM

## 2023-06-16 RX ORDER — SODIUM CHLORIDE 0.9 % (FLUSH) 0.9 %
5-40 SYRINGE (ML) INJECTION PRN
Status: DISCONTINUED | OUTPATIENT
Start: 2023-06-16 | End: 2023-06-16 | Stop reason: HOSPADM

## 2023-06-16 RX ORDER — NITROFURANTOIN 25; 75 MG/1; MG/1
100 CAPSULE ORAL 2 TIMES DAILY
Qty: 6 CAPSULE | Refills: 0 | Status: SHIPPED | OUTPATIENT
Start: 2023-06-16 | End: 2023-06-19

## 2023-06-16 RX ORDER — MAGNESIUM HYDROXIDE 1200 MG/15ML
LIQUID ORAL CONTINUOUS PRN
Status: COMPLETED | OUTPATIENT
Start: 2023-06-16 | End: 2023-06-16

## 2023-06-16 RX ORDER — SODIUM CHLORIDE 9 MG/ML
INJECTION, SOLUTION INTRAVENOUS PRN
Status: DISCONTINUED | OUTPATIENT
Start: 2023-06-16 | End: 2023-06-16 | Stop reason: HOSPADM

## 2023-06-16 RX ORDER — METOCLOPRAMIDE HYDROCHLORIDE 5 MG/ML
10 INJECTION INTRAMUSCULAR; INTRAVENOUS
Status: DISCONTINUED | OUTPATIENT
Start: 2023-06-16 | End: 2023-06-16 | Stop reason: HOSPADM

## 2023-06-16 RX ORDER — SODIUM CHLORIDE 0.9 % (FLUSH) 0.9 %
5-40 SYRINGE (ML) INJECTION EVERY 12 HOURS SCHEDULED
Status: DISCONTINUED | OUTPATIENT
Start: 2023-06-16 | End: 2023-06-16 | Stop reason: HOSPADM

## 2023-06-16 RX ORDER — MAGNESIUM HYDROXIDE 1200 MG/15ML
LIQUID ORAL PRN
Status: DISCONTINUED | OUTPATIENT
Start: 2023-06-16 | End: 2023-06-16 | Stop reason: ALTCHOICE

## 2023-06-16 ASSESSMENT — PAIN SCALES - GENERAL: PAINLEVEL_OUTOF10: 0

## 2023-06-16 NOTE — OP NOTE
FACILITY:  [place], [place]    PATIENT:  Angeles Whittaker  MRN: [id number]  YOB: 1933   DATE: 6/16/2023     SURGEON: Dr. Mindy Styles MD  ASSISTANT: None     PREOPERATIVE DIAGNOSIS: BPH with obstruction      POSTOPERATIVE DIAGNOSIS: BPH with obstruction      PROCEDURES PERFORMED:  1. Cystoscopy. 2. GreenLight photovaporization of the prostate. ANESTHESIA: General  COMPLICATIONS: none  DRAINS: 22 French 3-way wilson catheter  SPECIMEN: none  ESTIMATED BLOOD LOSS: less than 50 ml     INDICATIONS:  This is a 80 y.o. male presents today for GreenLight photovaporization of the prostate. After risks, benefits and alternatives of the procedure were discussed with the patient, informed consent was obtained and the patient elected to proceed. The patient was given Ampicillin 1g and gentamicin 80mg 2g IV on call to OR for antibiotic prophylaxis. Patient had EPC cuffs placed for VTE prophylaxis. DETAILS OF PROCEDURE:  General anesthesia was induced and the patient was placed in the dorsal lithotomy position. A time-out was performed to confirm patient identity and procedure. He was then prepped and draped in the usual sterile fashion. A continuous flow sheath with obturator and lens was inserted through the patient's urethra and into the bladder. Upon entering the bladder, both ureteral orifices were located and found to be a safe distance from the vesical neck. The GreenLight fiber was then inserted after the obturator was removed and the working bridge was placed through the continous flow sheath. GreenLight photovaporization was initiated beginning at the 6 o'clock position and a groove was made starting at the bladder neck and extending back to the verumontum. After this, the patient's lateral lobe BPH was addressed. The adenomatous tissue was vaporized down to the level of the capsule, starting first on the right then on the left.   The adenomatous tissue anteriorly was

## 2023-06-16 NOTE — DISCHARGE INSTRUCTIONS
GREENLIGHT DISCHARGE INSTRUCTIONS   OK to dc home when recovered. Medications in chart  Monitor in PACU, wean CBI to light pink  DC with wilson catheter in place  Pt will Follow up with Dr. Criss Brothers  as scheduled for void trial in 3 days  May have blood in the urine. May have mild burning  Call your doctor for the following:   Chills   Temperature greater than 101   Pain that is not tolerable despite taking pain medicine as ordered   There is increased swelling, redness or warmth at surgical site   There is increased drainage or bleeding from surgical site   No urine draining from wilson     No alcoholic beverages, no driving or operating machinery, no making important decisions for 24 hours. You may have a normal diet but should eat lightly day of surgery.   Drink plenty of fluids  RESUME ELIQUIS ON ***

## 2023-06-16 NOTE — PROGRESS NOTES
Pt's CBI intake is 900 and output is 200 with clear/yellow. Dr. Latia Hanson notified at 200. No reply at this time.

## 2023-06-16 NOTE — PROGRESS NOTES
36 - Dr. Karen Lewis at bedside.  not concerned with smaller output than input. As long as clear output in wilson, pt okay to go home.

## 2023-06-19 ENCOUNTER — NURSE ONLY (OUTPATIENT)
Dept: UROLOGY | Age: 88
End: 2023-06-19
Payer: MEDICARE

## 2023-06-19 DIAGNOSIS — N40.1 BENIGN LOCALIZED PROSTATIC HYPERPLASIA WITH LOWER URINARY TRACT SYMPTOMS (LUTS): Primary | ICD-10-CM

## 2023-06-19 PROCEDURE — 99212 OFFICE O/P EST SF 10 MIN: CPT

## 2023-06-19 NOTE — PROGRESS NOTES
Patient present for wilson removal post Cystoscopy with greenlight, yellow color urine noted in drainage bag, 10 ml balloon deflated, and 22 fr  was removed without difficulty. Instructed patient to drink fluids, patient may experience urinary leakage, blood in the urine, and it may burn when urinating for a few weeks. If patient is unable to urinate in 8 hours, is to call the office to have wilson placed or needs to go to the emergency room. Patient verbalized understanding.

## 2023-07-10 NOTE — PROGRESS NOTES
this visit. There is no height or weight on file to calculate BMI. Constitutional: Patient in no acute distress;         Assessment and Plan        1. Benign localized prostatic hyperplasia with lower urinary tract symptoms (LUTS)    2. Elevated PSA               Plan: Mcintosh catheter irrigated to clear today  Follo wup as planned      Prescriptions Ordered:  No orders of the defined types were placed in this encounter. Orders Placed:  No orders of the defined types were placed in this encounter.            Myrna Jin MD

## 2023-07-17 ENCOUNTER — OFFICE VISIT (OUTPATIENT)
Dept: UROLOGY | Age: 88
End: 2023-07-17
Payer: MEDICARE

## 2023-07-17 VITALS
WEIGHT: 218 LBS | HEIGHT: 67 IN | DIASTOLIC BLOOD PRESSURE: 74 MMHG | SYSTOLIC BLOOD PRESSURE: 132 MMHG | BODY MASS INDEX: 34.21 KG/M2 | HEART RATE: 84 BPM

## 2023-07-17 DIAGNOSIS — N40.1 BENIGN LOCALIZED PROSTATIC HYPERPLASIA WITH LOWER URINARY TRACT SYMPTOMS (LUTS): Primary | ICD-10-CM

## 2023-07-17 LAB — POST VOID RESIDUAL (PVR): 47 ML

## 2023-07-17 PROCEDURE — 99024 POSTOP FOLLOW-UP VISIT: CPT | Performed by: UROLOGY

## 2023-07-17 PROCEDURE — PBSHW PR MEAS POST-VOIDING RESIDUAL URINE&/BLADDER CAP: Performed by: UROLOGY

## 2023-07-17 PROCEDURE — 99213 OFFICE O/P EST LOW 20 MIN: CPT | Performed by: UROLOGY

## 2023-07-17 PROCEDURE — 51798 US URINE CAPACITY MEASURE: CPT | Performed by: UROLOGY

## 2023-07-17 NOTE — PROGRESS NOTES
S/p PVP  Was previously catheter dependent  Cont flomax for now  STOP bethanechol  Does have some OAB with UUI- hold on meds for now to avoid polypharmacy risk at his age      F/u 3-4 months w pvr with [facility]

## 2023-08-02 ENCOUNTER — OFFICE VISIT (OUTPATIENT)
Dept: CARDIOLOGY | Age: 88
End: 2023-08-02
Payer: MEDICARE

## 2023-08-02 VITALS
WEIGHT: 211 LBS | HEIGHT: 67 IN | SYSTOLIC BLOOD PRESSURE: 140 MMHG | DIASTOLIC BLOOD PRESSURE: 52 MMHG | BODY MASS INDEX: 33.12 KG/M2 | HEART RATE: 78 BPM

## 2023-08-02 DIAGNOSIS — I48.21 PERMANENT ATRIAL FIBRILLATION (HCC): ICD-10-CM

## 2023-08-02 DIAGNOSIS — I50.43 CHF (CONGESTIVE HEART FAILURE), NYHA CLASS I, ACUTE ON CHRONIC, COMBINED (HCC): Primary | ICD-10-CM

## 2023-08-02 PROCEDURE — 1036F TOBACCO NON-USER: CPT | Performed by: INTERNAL MEDICINE

## 2023-08-02 PROCEDURE — 1123F ACP DISCUSS/DSCN MKR DOCD: CPT | Performed by: INTERNAL MEDICINE

## 2023-08-02 PROCEDURE — 99212 OFFICE O/P EST SF 10 MIN: CPT | Performed by: INTERNAL MEDICINE

## 2023-08-02 PROCEDURE — G8428 CUR MEDS NOT DOCUMENT: HCPCS | Performed by: INTERNAL MEDICINE

## 2023-08-02 PROCEDURE — 93005 ELECTROCARDIOGRAM TRACING: CPT | Performed by: INTERNAL MEDICINE

## 2023-08-02 PROCEDURE — 99214 OFFICE O/P EST MOD 30 MIN: CPT | Performed by: INTERNAL MEDICINE

## 2023-08-02 PROCEDURE — 93010 ELECTROCARDIOGRAM REPORT: CPT | Performed by: INTERNAL MEDICINE

## 2023-08-02 PROCEDURE — G8417 CALC BMI ABV UP PARAM F/U: HCPCS | Performed by: INTERNAL MEDICINE

## 2023-08-02 RX ORDER — BUMETANIDE 1 MG/1
1 TABLET ORAL DAILY
Qty: 30 TABLET | Refills: 3 | Status: SHIPPED | OUTPATIENT
Start: 2023-08-02

## 2023-08-02 NOTE — PROGRESS NOTES
pulmonary  hypertension. Aortic root is mildly dilated at 4.4 cm. Assessment / Acute Cardiac Problems:      1-Chronic systolic and diastolic congestive heart failure  2-Permanent atrial fibrillation  3-mildly reduced LV systolic function ejection fraction of 40% on echo  4- MV CAD S/P CABG: repeat cardiac cath 10/2017 as above with patent grafts: Stable with no signs of symptoms of ischemia. 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 6-Renal insufficiency last creatinine  7-HTN: Better controlled. 8-HLP: on Statin. 9-Obesity. Plan of Treatment:      1-Continue current medical treatment with Eliquis, BB, Diuretics, CCB and statin. DC ASA  2-monitor daily fluid intake, monitor daily weights  3-Diet, exercise and loose weight. 4-not on ACE inhibitors or afterload reduction due to renal insufficiency   5-F/U in 6 months.     Gary Villarreal, [address] [place] [address] [facility]  [phone]

## 2023-10-06 ENCOUNTER — OFFICE VISIT (OUTPATIENT)
Dept: FAMILY MEDICINE CLINIC | Age: 88
End: 2023-10-06
Payer: MEDICARE

## 2023-10-06 VITALS
DIASTOLIC BLOOD PRESSURE: 82 MMHG | HEART RATE: 64 BPM | WEIGHT: 219 LBS | HEIGHT: 67 IN | BODY MASS INDEX: 34.37 KG/M2 | SYSTOLIC BLOOD PRESSURE: 136 MMHG

## 2023-10-06 DIAGNOSIS — R33.8 BENIGN PROSTATIC HYPERPLASIA WITH URINARY RETENTION: Primary | ICD-10-CM

## 2023-10-06 DIAGNOSIS — I48.19 PERSISTENT ATRIAL FIBRILLATION (HCC): ICD-10-CM

## 2023-10-06 DIAGNOSIS — H43.813 DEGENERATION OF POSTERIOR VITREOUS BODY OF BOTH EYES: ICD-10-CM

## 2023-10-06 DIAGNOSIS — I50.33 ACUTE ON CHRONIC DIASTOLIC CONGESTIVE HEART FAILURE (HCC): ICD-10-CM

## 2023-10-06 DIAGNOSIS — Z23 NEED FOR VACCINATION: ICD-10-CM

## 2023-10-06 DIAGNOSIS — I72.3 ILIAC ARTERY ANEURYSM, RIGHT (HCC): ICD-10-CM

## 2023-10-06 DIAGNOSIS — I71.40 ABDOMINAL AORTIC ANEURYSM (AAA) WITHOUT RUPTURE, UNSPECIFIED PART (HCC): ICD-10-CM

## 2023-10-06 DIAGNOSIS — I10 ESSENTIAL HYPERTENSION: ICD-10-CM

## 2023-10-06 DIAGNOSIS — E78.00 PURE HYPERCHOLESTEROLEMIA: ICD-10-CM

## 2023-10-06 DIAGNOSIS — N28.9 RENAL INSUFFICIENCY: ICD-10-CM

## 2023-10-06 DIAGNOSIS — N40.1 BENIGN PROSTATIC HYPERPLASIA WITH URINARY RETENTION: Primary | ICD-10-CM

## 2023-10-06 DIAGNOSIS — I25.10 CORONARY ARTERY DISEASE INVOLVING NATIVE CORONARY ARTERY OF NATIVE HEART WITHOUT ANGINA PECTORIS: ICD-10-CM

## 2023-10-06 PROCEDURE — G8417 CALC BMI ABV UP PARAM F/U: HCPCS | Performed by: FAMILY MEDICINE

## 2023-10-06 PROCEDURE — 99214 OFFICE O/P EST MOD 30 MIN: CPT | Performed by: FAMILY MEDICINE

## 2023-10-06 PROCEDURE — PBSHW INFLUENZA, FLUAD, (AGE 65 Y+), IM, PF, 0.5 ML: Performed by: FAMILY MEDICINE

## 2023-10-06 PROCEDURE — G8484 FLU IMMUNIZE NO ADMIN: HCPCS | Performed by: FAMILY MEDICINE

## 2023-10-06 PROCEDURE — 90694 VACC AIIV4 NO PRSRV 0.5ML IM: CPT | Performed by: FAMILY MEDICINE

## 2023-10-06 PROCEDURE — 1123F ACP DISCUSS/DSCN MKR DOCD: CPT | Performed by: FAMILY MEDICINE

## 2023-10-06 PROCEDURE — 99213 OFFICE O/P EST LOW 20 MIN: CPT | Performed by: FAMILY MEDICINE

## 2023-10-06 PROCEDURE — G8427 DOCREV CUR MEDS BY ELIG CLIN: HCPCS | Performed by: FAMILY MEDICINE

## 2023-10-06 PROCEDURE — 1036F TOBACCO NON-USER: CPT | Performed by: FAMILY MEDICINE

## 2023-10-06 RX ORDER — POTASSIUM CHLORIDE 20 MEQ/1
20 TABLET, EXTENDED RELEASE ORAL DAILY
Qty: 90 TABLET | Refills: 3 | Status: SHIPPED | OUTPATIENT
Start: 2023-10-06

## 2023-10-06 RX ORDER — BUMETANIDE 1 MG/1
1 TABLET ORAL DAILY
Qty: 90 TABLET | Refills: 3 | Status: SHIPPED | OUTPATIENT
Start: 2023-10-06

## 2023-10-06 RX ORDER — METOPROLOL SUCCINATE 50 MG/1
TABLET, EXTENDED RELEASE ORAL
Qty: 90 TABLET | Refills: 3 | Status: SHIPPED | OUTPATIENT
Start: 2023-10-06

## 2023-10-06 ASSESSMENT — ENCOUNTER SYMPTOMS
GASTROINTESTINAL NEGATIVE: 1
CONSTIPATION: 0
NAUSEA: 0
VOMITING: 0
DIARRHEA: 0
RESPIRATORY NEGATIVE: 1

## 2023-10-06 NOTE — PROGRESS NOTES
Subjective:      Patient ID: Rhonda Kamara is a 80 y.o. male. HPI   Routine follow up on chronic medical conditions, refills, and review of updated labs. Feeling ok at present. He still enjoys working on lawn mowers. Turp helped, still with frequency, somewhat enhanced by diuretic. Some incontinence, wearing depends. Compliant with medications. No injury or illness to report. No chest pain or sob. Ran out of potassium about 2 months. Past Medical History:   Diagnosis Date    Aneurysm of infrarenal abdominal aorta (HCC)     Atrial fibrillation ([address]) 11/25/2014    Cardioversion successful    BPH (benign prostatic hyperplasia)     with obstruction    CAD (coronary artery disease)     Dr. Renea Bashir [facility]/ [place]/ last seen 5-2023    Cerebrovascular disease     CHF (congestive heart failure) ([address])     DVT (deep venous thrombosis) ([address]) 1960's    left leg    Elevated PSA     Hematuria     Hyperlipidemia     Hyperopia with astigmatism and presbyopia     Hypertension     Peripheral edema     Postoperative care for cataract of right eye 07/25/2019    Under care of team     urology Dr. Lonny Dobbins [place]/ last seen 5-2023    Wears glasses     Wellness examination     PCP Jonathan Garnica MD/[place]/ last seen 6-2023     Past Surgical History:   Procedure Laterality Date    BACK SURGERY      CARDIAC SURGERY  11/17/2014    cath/ stents    CHOLECYSTECTOMY, LAPAROSCOPIC  12/30/2017    CORONARY ARTERY BYPASS GRAFT  11/17/2014    OP CABG X 4- Dr Mendoza Splinter  06/16/2023    CYSTOSCOPY Rebbecca Orn    ERCP  12/29/2017     A Cholangiogram showed diffusely dilated CBD with no obvious filling defect. Biliary sphincterotomy was performed. Drainage was suboptimal. The biliary os and distal CBD were balloon dilated to 12 mm. Good drainage was noted.     EYE SURGERY Right 07/25/2019    cataract removal    INTRACAPSULAR CATARACT EXTRACTION Right 07/25/2019    Right Cataract Extraction w/ IOL Implant performed by Johnny Carrillo

## 2023-10-23 ENCOUNTER — OFFICE VISIT (OUTPATIENT)
Dept: UROLOGY | Age: 88
End: 2023-10-23
Payer: MEDICARE

## 2023-10-23 VITALS
BODY MASS INDEX: 34.21 KG/M2 | HEIGHT: 67 IN | HEART RATE: 82 BPM | WEIGHT: 218 LBS | SYSTOLIC BLOOD PRESSURE: 130 MMHG | DIASTOLIC BLOOD PRESSURE: 82 MMHG

## 2023-10-23 DIAGNOSIS — R97.20 ELEVATED PSA: ICD-10-CM

## 2023-10-23 DIAGNOSIS — N40.1 BENIGN LOCALIZED PROSTATIC HYPERPLASIA WITH LOWER URINARY TRACT SYMPTOMS (LUTS): Primary | ICD-10-CM

## 2023-10-23 LAB — POST VOID RESIDUAL (PVR): 70 ML

## 2023-10-23 PROCEDURE — 51798 US URINE CAPACITY MEASURE: CPT | Performed by: UROLOGY

## 2023-10-23 PROCEDURE — G8427 DOCREV CUR MEDS BY ELIG CLIN: HCPCS | Performed by: UROLOGY

## 2023-10-23 PROCEDURE — 99213 OFFICE O/P EST LOW 20 MIN: CPT | Performed by: UROLOGY

## 2023-10-23 PROCEDURE — G8484 FLU IMMUNIZE NO ADMIN: HCPCS | Performed by: UROLOGY

## 2023-10-23 PROCEDURE — 1123F ACP DISCUSS/DSCN MKR DOCD: CPT | Performed by: UROLOGY

## 2023-10-23 PROCEDURE — PBSHW PR MEAS POST-VOIDING RESIDUAL URINE&/BLADDER CAP: Performed by: UROLOGY

## 2023-10-23 PROCEDURE — G8417 CALC BMI ABV UP PARAM F/U: HCPCS | Performed by: UROLOGY

## 2023-10-23 PROCEDURE — 1036F TOBACCO NON-USER: CPT | Performed by: UROLOGY

## 2023-10-23 PROCEDURE — 99214 OFFICE O/P EST MOD 30 MIN: CPT | Performed by: UROLOGY

## 2023-10-23 NOTE — PROGRESS NOTES
Post void residual by bladder scanner 70cc.
catheter dependent. Pvr low. Elevated psa- needs recheck of PSA in six months. I did offer prostate biopsy if still elevated but pt does not want this and it think it's very reasonable due to his age      PSA recheck in six months and f/u jodi    Prescriptions Ordered:  No orders of the defined types were placed in this encounter.      Orders Placed:  Orders Placed This Encounter   Procedures    MD AMARI POST-VOIDING RESIDUAL URINE&/BLADDER CAP            RAYMOND Foster MD

## 2023-11-05 DIAGNOSIS — N40.0 BENIGN PROSTATIC HYPERPLASIA, UNSPECIFIED WHETHER LOWER URINARY TRACT SYMPTOMS PRESENT: ICD-10-CM

## 2023-11-06 RX ORDER — TAMSULOSIN HYDROCHLORIDE 0.4 MG/1
CAPSULE ORAL
Qty: 90 CAPSULE | Refills: 3 | OUTPATIENT
Start: 2023-11-06

## 2023-11-06 RX ORDER — SIMVASTATIN 20 MG
TABLET ORAL
Qty: 90 TABLET | Refills: 3 | OUTPATIENT
Start: 2023-11-06

## 2024-01-30 RX ORDER — APIXABAN 2.5 MG/1
2.5 TABLET, FILM COATED ORAL 2 TIMES DAILY
Qty: 180 TABLET | Refills: 3 | Status: SHIPPED | OUTPATIENT
Start: 2024-01-30

## 2024-01-30 RX ORDER — AMLODIPINE BESYLATE 5 MG/1
5 TABLET ORAL DAILY
Qty: 90 TABLET | Refills: 3 | Status: SHIPPED | OUTPATIENT
Start: 2024-01-30

## 2024-01-30 NOTE — TELEPHONE ENCOUNTER
Aditya called requesting a refill of the below medication which has been pended for you:     Requested Prescriptions     Pending Prescriptions Disp Refills    amLODIPine (NORVASC) 5 MG tablet [Pharmacy Med Name: amLODIPine Besylate 5 MG Oral Tablet] 90 tablet 3     Sig: TAKE 1 TABLET BY MOUTH DAILY    ELIQUIS 2.5 MG TABS tablet [Pharmacy Med Name: Eliquis 2.5 MG Oral Tablet] 180 tablet 3     Sig: TAKE 1 TABLET BY MOUTH TWICE  DAILY       Last Appointment Date: 10/6/2023  Next Appointment Date: 4/8/2024    No Known Allergies

## 2024-03-20 DIAGNOSIS — N40.0 BENIGN PROSTATIC HYPERPLASIA, UNSPECIFIED WHETHER LOWER URINARY TRACT SYMPTOMS PRESENT: ICD-10-CM

## 2024-03-20 DIAGNOSIS — E78.00 PURE HYPERCHOLESTEROLEMIA: Primary | ICD-10-CM

## 2024-03-20 RX ORDER — TAMSULOSIN HYDROCHLORIDE 0.4 MG/1
CAPSULE ORAL
Qty: 90 CAPSULE | Refills: 3 | Status: SHIPPED | OUTPATIENT
Start: 2024-03-20

## 2024-03-20 RX ORDER — SIMVASTATIN 20 MG
TABLET ORAL
Qty: 90 TABLET | Refills: 3 | Status: SHIPPED | OUTPATIENT
Start: 2024-03-20

## 2024-03-20 NOTE — TELEPHONE ENCOUNTER
Aditya called requesting a refill of the below medication which has been pended for you:     Requested Prescriptions     Pending Prescriptions Disp Refills    simvastatin (ZOCOR) 20 MG tablet 90 tablet 3     Si po daily    tamsulosin (FLOMAX) 0.4 MG capsule 90 capsule 3     Sig: TAKE 1 CAPSULE DAILY       Last Appointment Date: 10/6/2023  Next Appointment Date: Visit date not found    No Known Allergies

## 2024-08-04 DIAGNOSIS — I10 ESSENTIAL HYPERTENSION: ICD-10-CM

## 2024-08-05 RX ORDER — METOPROLOL SUCCINATE 50 MG/1
TABLET, EXTENDED RELEASE ORAL
Qty: 90 TABLET | Refills: 3 | OUTPATIENT
Start: 2024-08-05

## 2024-08-05 RX ORDER — POTASSIUM CHLORIDE 1500 MG/1
20 TABLET, EXTENDED RELEASE ORAL DAILY
Qty: 90 TABLET | Refills: 3 | OUTPATIENT
Start: 2024-08-05

## 2024-08-13 RX ORDER — BUMETANIDE 1 MG/1
TABLET ORAL
Qty: 180 TABLET | Refills: 3 | Status: SHIPPED | OUTPATIENT
Start: 2024-08-13

## 2024-08-13 NOTE — TELEPHONE ENCOUNTER
Aditya called requesting a refill of the below medication which has been pended for you:     Requested Prescriptions     Pending Prescriptions Disp Refills    bumetanide (BUMEX) 1 MG tablet [Pharmacy Med Name: Bumetanide 1 MG Oral Tablet] 180 tablet 3     Sig: TAKE 1 TABLET BY MOUTH DAILY  WEIGH YOURSELF DAILY IF WEIGHT  IS 2 LBS OR MORE ABOVE BASELINE  WEIGHT, TAKE 1 EXTRA TABLET AND  CALL  TO REPORT       Last Appointment Date: 10/6/2023  Next Appointment Date: Visit date not found    No Known Allergies

## 2024-08-19 ENCOUNTER — TELEPHONE (OUTPATIENT)
Dept: OTHER | Facility: CLINIC | Age: 89
End: 2024-08-19

## 2024-10-11 RX ORDER — BUMETANIDE 1 MG/1
TABLET ORAL
Qty: 180 TABLET | Refills: 3 | OUTPATIENT
Start: 2024-10-11

## 2024-12-11 NOTE — TELEPHONE ENCOUNTER
Spoke with daughter and advised pt needs to schedule appt, hasn't been seen in over a year. Daughter states pt is stubborn, and she doesn't think he will schedule, but she will talk to him and call back.

## 2024-12-20 NOTE — TELEPHONE ENCOUNTER
Spoke with daughter and she states her dad is refusing to come in for appt. I advised her that in order to care for him in a safe manor we would need to see him at least once a year. Daughter states she is just unable to get him to come in. What would you like to do regarding refills?

## 2024-12-26 RX ORDER — APIXABAN 2.5 MG/1
2.5 TABLET, FILM COATED ORAL 2 TIMES DAILY
Qty: 60 TABLET | Refills: 0 | Status: SHIPPED | OUTPATIENT
Start: 2024-12-26

## 2024-12-26 RX ORDER — AMLODIPINE BESYLATE 5 MG/1
5 TABLET ORAL DAILY
Qty: 90 TABLET | Refills: 3 | Status: SHIPPED | OUTPATIENT
Start: 2024-12-26

## 2024-12-26 NOTE — TELEPHONE ENCOUNTER
Spoke to daughter and she states patient said if he has to see a doctor to get his medication filled, he will go without the medication.

## 2025-01-10 RX ORDER — APIXABAN 2.5 MG/1
2.5 TABLET, FILM COATED ORAL 2 TIMES DAILY
Qty: 60 TABLET | Refills: 11 | OUTPATIENT
Start: 2025-01-10

## 2025-01-17 ENCOUNTER — APPOINTMENT (OUTPATIENT)
Dept: CT IMAGING | Age: 89
DRG: 291 | End: 2025-01-17
Payer: MEDICARE

## 2025-01-17 ENCOUNTER — HOSPITAL ENCOUNTER (INPATIENT)
Age: 89
LOS: 2 days | Discharge: HOME | DRG: 291 | End: 2025-01-19
Attending: EMERGENCY MEDICINE | Admitting: FAMILY MEDICINE
Payer: MEDICARE

## 2025-01-17 ENCOUNTER — APPOINTMENT (OUTPATIENT)
Dept: GENERAL RADIOLOGY | Age: 89
DRG: 291 | End: 2025-01-17
Payer: MEDICARE

## 2025-01-17 DIAGNOSIS — I10 ESSENTIAL HYPERTENSION: ICD-10-CM

## 2025-01-17 DIAGNOSIS — I50.9 ACUTE ON CHRONIC CONGESTIVE HEART FAILURE, UNSPECIFIED HEART FAILURE TYPE (HCC): Primary | ICD-10-CM

## 2025-01-17 DIAGNOSIS — R41.82 ALTERED MENTAL STATUS, UNSPECIFIED ALTERED MENTAL STATUS TYPE: ICD-10-CM

## 2025-01-17 DIAGNOSIS — N40.0 BENIGN PROSTATIC HYPERPLASIA, UNSPECIFIED WHETHER LOWER URINARY TRACT SYMPTOMS PRESENT: ICD-10-CM

## 2025-01-17 PROBLEM — K80.01 CALCULUS OF GALLBLADDER WITH ACUTE CHOLECYSTITIS AND OBSTRUCTION: Status: RESOLVED | Noted: 2017-12-28 | Resolved: 2025-01-17

## 2025-01-17 PROBLEM — R41.3 MEMORY IMPAIRMENT: Status: ACTIVE | Noted: 2025-01-17

## 2025-01-17 PROBLEM — I50.43 ACUTE ON CHRONIC COMBINED SYSTOLIC AND DIASTOLIC CHF (CONGESTIVE HEART FAILURE) (HCC): Status: ACTIVE | Noted: 2025-01-17

## 2025-01-17 PROBLEM — D69.6 THROMBOCYTOPENIA (HCC): Status: ACTIVE | Noted: 2025-01-17

## 2025-01-17 LAB
ALBUMIN SERPL-MCNC: 4 G/DL (ref 3.5–5.2)
ALBUMIN/GLOB SERPL: 1.3 {RATIO} (ref 1–2.5)
ALP SERPL-CCNC: 133 U/L (ref 40–129)
ALT SERPL-CCNC: 14 U/L (ref 5–41)
ANION GAP SERPL CALCULATED.3IONS-SCNC: 12 MMOL/L (ref 9–17)
AST SERPL-CCNC: 23 U/L
BASOPHILS # BLD: <0.03 K/UL (ref 0–0.2)
BASOPHILS NFR BLD: 0 % (ref 0–2)
BILIRUB SERPL-MCNC: 1.3 MG/DL (ref 0.3–1.2)
BNP SERPL-MCNC: 9189 PG/ML
BUN SERPL-MCNC: 22 MG/DL (ref 8–23)
BUN/CREAT SERPL: 20 (ref 9–20)
CALCIUM SERPL-MCNC: 8.9 MG/DL (ref 8.6–10.4)
CHLORIDE SERPL-SCNC: 104 MMOL/L (ref 98–107)
CO2 SERPL-SCNC: 23 MMOL/L (ref 20–31)
CREAT SERPL-MCNC: 1.1 MG/DL (ref 0.7–1.2)
EOSINOPHIL # BLD: 0.07 K/UL (ref 0–0.44)
EOSINOPHILS RELATIVE PERCENT: 1 % (ref 1–4)
ERYTHROCYTE [DISTWIDTH] IN BLOOD BY AUTOMATED COUNT: 15.3 % (ref 11.8–14.4)
FLUAV AG SPEC QL: NEGATIVE
FLUBV AG SPEC QL: NEGATIVE
GFR, ESTIMATED: 63 ML/MIN/1.73M2
GLUCOSE SERPL-MCNC: 92 MG/DL (ref 70–99)
HCT VFR BLD AUTO: 45.3 % (ref 40.7–50.3)
HGB BLD-MCNC: 14.5 G/DL (ref 13–17)
IMM GRANULOCYTES # BLD AUTO: 0.03 K/UL (ref 0–0.3)
IMM GRANULOCYTES NFR BLD: 0 %
INR PPP: 1.3
LACTATE BLDV-SCNC: 1.3 MMOL/L (ref 0.5–1.9)
LYMPHOCYTES NFR BLD: 0.99 K/UL (ref 1.1–3.7)
LYMPHOCYTES RELATIVE PERCENT: 14 % (ref 24–43)
MAGNESIUM SERPL-MCNC: 1.9 MG/DL (ref 1.6–2.6)
MCH RBC QN AUTO: 29.4 PG (ref 25.2–33.5)
MCHC RBC AUTO-ENTMCNC: 32 G/DL (ref 25.2–33.5)
MCV RBC AUTO: 91.7 FL (ref 82.6–102.9)
MONOCYTES NFR BLD: 0.54 K/UL (ref 0.1–1.2)
MONOCYTES NFR BLD: 7 % (ref 3–12)
NEUTROPHILS NFR BLD: 77 % (ref 36–65)
NEUTS SEG NFR BLD: 5.64 K/UL (ref 1.5–8.1)
NRBC BLD-RTO: 0 PER 100 WBC
PLATELET # BLD AUTO: ABNORMAL K/UL (ref 138–453)
PLATELET, FLUORESCENCE: 129 K/UL (ref 138–453)
PLATELETS.RETICULATED NFR BLD AUTO: 6.2 % (ref 1.1–10.3)
POTASSIUM SERPL-SCNC: 4.1 MMOL/L (ref 3.7–5.3)
PROT SERPL-MCNC: 7.2 G/DL (ref 6.4–8.3)
PROTHROMBIN TIME: 15.4 SEC (ref 11.5–14.2)
RBC # BLD AUTO: 4.94 M/UL (ref 4.21–5.77)
SARS-COV-2 RDRP RESP QL NAA+PROBE: NOT DETECTED
SODIUM SERPL-SCNC: 139 MMOL/L (ref 135–144)
SPECIMEN DESCRIPTION: NORMAL
TROPONIN I SERPL HS-MCNC: 35 NG/L (ref 0–22)
TROPONIN I SERPL HS-MCNC: 39 NG/L (ref 0–22)
WBC OTHER # BLD: 7.3 K/UL (ref 3.5–11.3)

## 2025-01-17 PROCEDURE — 2060000000 HC ICU INTERMEDIATE R&B

## 2025-01-17 PROCEDURE — 83605 ASSAY OF LACTIC ACID: CPT

## 2025-01-17 PROCEDURE — 84484 ASSAY OF TROPONIN QUANT: CPT

## 2025-01-17 PROCEDURE — 87804 INFLUENZA ASSAY W/OPTIC: CPT

## 2025-01-17 PROCEDURE — 87186 SC STD MICRODIL/AGAR DIL: CPT

## 2025-01-17 PROCEDURE — 2500000003 HC RX 250 WO HCPCS: Performed by: NURSE PRACTITIONER

## 2025-01-17 PROCEDURE — 87086 URINE CULTURE/COLONY COUNT: CPT

## 2025-01-17 PROCEDURE — 96374 THER/PROPH/DIAG INJ IV PUSH: CPT

## 2025-01-17 PROCEDURE — 81001 URINALYSIS AUTO W/SCOPE: CPT

## 2025-01-17 PROCEDURE — 99222 1ST HOSP IP/OBS MODERATE 55: CPT | Performed by: NURSE PRACTITIONER

## 2025-01-17 PROCEDURE — 83735 ASSAY OF MAGNESIUM: CPT

## 2025-01-17 PROCEDURE — 71045 X-RAY EXAM CHEST 1 VIEW: CPT

## 2025-01-17 PROCEDURE — 36415 COLL VENOUS BLD VENIPUNCTURE: CPT

## 2025-01-17 PROCEDURE — 87077 CULTURE AEROBIC IDENTIFY: CPT

## 2025-01-17 PROCEDURE — 87040 BLOOD CULTURE FOR BACTERIA: CPT

## 2025-01-17 PROCEDURE — 6370000000 HC RX 637 (ALT 250 FOR IP): Performed by: NURSE PRACTITIONER

## 2025-01-17 PROCEDURE — 85610 PROTHROMBIN TIME: CPT

## 2025-01-17 PROCEDURE — 93005 ELECTROCARDIOGRAM TRACING: CPT | Performed by: EMERGENCY MEDICINE

## 2025-01-17 PROCEDURE — 87635 SARS-COV-2 COVID-19 AMP PRB: CPT

## 2025-01-17 PROCEDURE — 85025 COMPLETE CBC W/AUTO DIFF WBC: CPT

## 2025-01-17 PROCEDURE — 83880 ASSAY OF NATRIURETIC PEPTIDE: CPT

## 2025-01-17 PROCEDURE — 70450 CT HEAD/BRAIN W/O DYE: CPT

## 2025-01-17 PROCEDURE — 99285 EMERGENCY DEPT VISIT HI MDM: CPT

## 2025-01-17 PROCEDURE — 80053 COMPREHEN METABOLIC PANEL: CPT

## 2025-01-17 PROCEDURE — 6360000002 HC RX W HCPCS: Performed by: EMERGENCY MEDICINE

## 2025-01-17 RX ORDER — ENALAPRILAT 1.25 MG/ML
1.25 INJECTION INTRAVENOUS EVERY 6 HOURS PRN
Status: DISCONTINUED | OUTPATIENT
Start: 2025-01-17 | End: 2025-01-19 | Stop reason: HOSPADM

## 2025-01-17 RX ORDER — ACETAMINOPHEN 325 MG/1
650 TABLET ORAL EVERY 6 HOURS PRN
Status: DISCONTINUED | OUTPATIENT
Start: 2025-01-17 | End: 2025-01-19 | Stop reason: HOSPADM

## 2025-01-17 RX ORDER — POTASSIUM CHLORIDE 1500 MG/1
40 TABLET, EXTENDED RELEASE ORAL PRN
Status: DISCONTINUED | OUTPATIENT
Start: 2025-01-17 | End: 2025-01-19 | Stop reason: HOSPADM

## 2025-01-17 RX ORDER — ATORVASTATIN CALCIUM 10 MG/1
10 TABLET, FILM COATED ORAL DAILY
Status: DISCONTINUED | OUTPATIENT
Start: 2025-01-18 | End: 2025-01-19 | Stop reason: HOSPADM

## 2025-01-17 RX ORDER — SODIUM CHLORIDE FOR INHALATION 0.9 %
3 VIAL, NEBULIZER (ML) INHALATION
Status: DISCONTINUED | OUTPATIENT
Start: 2025-01-17 | End: 2025-01-17

## 2025-01-17 RX ORDER — ALBUTEROL SULFATE 0.83 MG/ML
2.5 SOLUTION RESPIRATORY (INHALATION)
Status: DISCONTINUED | OUTPATIENT
Start: 2025-01-17 | End: 2025-01-19 | Stop reason: HOSPADM

## 2025-01-17 RX ORDER — POTASSIUM CHLORIDE 7.45 MG/ML
10 INJECTION INTRAVENOUS PRN
Status: DISCONTINUED | OUTPATIENT
Start: 2025-01-17 | End: 2025-01-19 | Stop reason: HOSPADM

## 2025-01-17 RX ORDER — SODIUM CHLORIDE 0.9 % (FLUSH) 0.9 %
5-40 SYRINGE (ML) INJECTION PRN
Status: DISCONTINUED | OUTPATIENT
Start: 2025-01-17 | End: 2025-01-19 | Stop reason: HOSPADM

## 2025-01-17 RX ORDER — METOPROLOL SUCCINATE 50 MG/1
50 TABLET, EXTENDED RELEASE ORAL DAILY
Status: DISCONTINUED | OUTPATIENT
Start: 2025-01-18 | End: 2025-01-18

## 2025-01-17 RX ORDER — MAGNESIUM SULFATE IN WATER 40 MG/ML
2000 INJECTION, SOLUTION INTRAVENOUS PRN
Status: DISCONTINUED | OUTPATIENT
Start: 2025-01-17 | End: 2025-01-19 | Stop reason: HOSPADM

## 2025-01-17 RX ORDER — ACETAMINOPHEN 650 MG/1
650 SUPPOSITORY RECTAL EVERY 6 HOURS PRN
Status: DISCONTINUED | OUTPATIENT
Start: 2025-01-17 | End: 2025-01-19 | Stop reason: HOSPADM

## 2025-01-17 RX ORDER — HYDRALAZINE HYDROCHLORIDE 25 MG/1
50 TABLET, FILM COATED ORAL EVERY 8 HOURS SCHEDULED
Status: DISCONTINUED | OUTPATIENT
Start: 2025-01-17 | End: 2025-01-19 | Stop reason: HOSPADM

## 2025-01-17 RX ORDER — POTASSIUM CHLORIDE 1500 MG/1
20 TABLET, EXTENDED RELEASE ORAL DAILY
Status: DISCONTINUED | OUTPATIENT
Start: 2025-01-18 | End: 2025-01-19

## 2025-01-17 RX ORDER — ALBUTEROL SULFATE 0.83 MG/ML
2.5 SOLUTION RESPIRATORY (INHALATION)
Status: DISCONTINUED | OUTPATIENT
Start: 2025-01-17 | End: 2025-01-17

## 2025-01-17 RX ORDER — AMLODIPINE BESYLATE 5 MG/1
5 TABLET ORAL DAILY
Status: DISCONTINUED | OUTPATIENT
Start: 2025-01-18 | End: 2025-01-19 | Stop reason: HOSPADM

## 2025-01-17 RX ORDER — TAMSULOSIN HYDROCHLORIDE 0.4 MG/1
0.4 CAPSULE ORAL DAILY
Status: DISCONTINUED | OUTPATIENT
Start: 2025-01-18 | End: 2025-01-18

## 2025-01-17 RX ORDER — POLYETHYLENE GLYCOL 3350 17 G/17G
17 POWDER, FOR SOLUTION ORAL DAILY PRN
Status: DISCONTINUED | OUTPATIENT
Start: 2025-01-17 | End: 2025-01-19 | Stop reason: HOSPADM

## 2025-01-17 RX ORDER — FUROSEMIDE 10 MG/ML
40 INJECTION INTRAMUSCULAR; INTRAVENOUS ONCE
Status: COMPLETED | OUTPATIENT
Start: 2025-01-17 | End: 2025-01-17

## 2025-01-17 RX ORDER — BUMETANIDE 0.25 MG/ML
1 INJECTION, SOLUTION INTRAMUSCULAR; INTRAVENOUS 2 TIMES DAILY
Status: DISCONTINUED | OUTPATIENT
Start: 2025-01-18 | End: 2025-01-19 | Stop reason: HOSPADM

## 2025-01-17 RX ORDER — ONDANSETRON 2 MG/ML
4 INJECTION INTRAMUSCULAR; INTRAVENOUS EVERY 6 HOURS PRN
Status: DISCONTINUED | OUTPATIENT
Start: 2025-01-17 | End: 2025-01-19 | Stop reason: HOSPADM

## 2025-01-17 RX ORDER — ONDANSETRON 4 MG/1
4 TABLET, ORALLY DISINTEGRATING ORAL EVERY 8 HOURS PRN
Status: DISCONTINUED | OUTPATIENT
Start: 2025-01-17 | End: 2025-01-19 | Stop reason: HOSPADM

## 2025-01-17 RX ORDER — SODIUM CHLORIDE 0.9 % (FLUSH) 0.9 %
5-40 SYRINGE (ML) INJECTION EVERY 12 HOURS SCHEDULED
Status: DISCONTINUED | OUTPATIENT
Start: 2025-01-17 | End: 2025-01-19 | Stop reason: HOSPADM

## 2025-01-17 RX ORDER — SODIUM CHLORIDE 9 MG/ML
INJECTION, SOLUTION INTRAVENOUS PRN
Status: DISCONTINUED | OUTPATIENT
Start: 2025-01-17 | End: 2025-01-19 | Stop reason: HOSPADM

## 2025-01-17 RX ADMIN — APIXABAN 2.5 MG: 2.5 TABLET, FILM COATED ORAL at 22:13

## 2025-01-17 RX ADMIN — FUROSEMIDE 40 MG: 10 INJECTION, SOLUTION INTRAMUSCULAR; INTRAVENOUS at 18:32

## 2025-01-17 RX ADMIN — HYDRALAZINE HYDROCHLORIDE 50 MG: 25 TABLET ORAL at 22:11

## 2025-01-17 RX ADMIN — SODIUM CHLORIDE, PRESERVATIVE FREE 10 ML: 5 INJECTION INTRAVENOUS at 22:20

## 2025-01-17 ASSESSMENT — PAIN SCALES - GENERAL
PAINLEVEL_OUTOF10: 0
PAINLEVEL_OUTOF10: 0
PAINLEVEL_OUTOF10: 5

## 2025-01-17 ASSESSMENT — LIFESTYLE VARIABLES
HOW MANY STANDARD DRINKS CONTAINING ALCOHOL DO YOU HAVE ON A TYPICAL DAY: 3 OR 4
HOW OFTEN DO YOU HAVE A DRINK CONTAINING ALCOHOL: MONTHLY OR LESS

## 2025-01-17 ASSESSMENT — PAIN - FUNCTIONAL ASSESSMENT: PAIN_FUNCTIONAL_ASSESSMENT: 0-10

## 2025-01-17 ASSESSMENT — PAIN DESCRIPTION - LOCATION: LOCATION: HEAD

## 2025-01-17 ASSESSMENT — PAIN DESCRIPTION - DESCRIPTORS: DESCRIPTORS: ACHING

## 2025-01-17 NOTE — ED NOTES
Asked family member if patient's medications brought in yet for review and states has not been back to his house yet. States will bring in later.

## 2025-01-17 NOTE — ED PROVIDER NOTES
[facility]  eMERGENCY dEPARTMENT eNCOUnter      Pt Name: Aditya Kimball  MRN: [id number]  Birthdate 9/18/1933  Date of evaluation: 1/17/2025      CHIEF COMPLAINT       Chief Complaint   Patient presents with    Shortness of Breath    left side weakness     Breathing getting worse in last 4 days         HISTORY OF PRESENT ILLNESS    Aditya Kimball is a 89 y.o. male who presents with chief complaint of cough shortness of breath but also some left-sided weakness and difficulty speaking started Sunday he said he is also had worsening vision both eyes since Sunday there is been no falls or trauma no fever he has a history of atrial fibrillation  And is on Eliquis.  his daughter called the [employer] when she found out about the symptoms    REVIEW OF SYSTEMS         Review of Systems   Constitutional:  Negative for chills and fever.   HENT:  Negative for congestion, dental problem, sore throat and trouble swallowing.    Eyes:  Positive for visual disturbance.   Respiratory:  Negative for shortness of breath and wheezing.    Cardiovascular:  Negative for chest pain, palpitations and leg swelling.   Gastrointestinal:  Negative for abdominal pain, blood in stool, constipation, diarrhea, nausea and vomiting.   Genitourinary:  Negative for difficulty urinating, dysuria and testicular pain.   Musculoskeletal:  Negative for back pain, joint swelling and neck pain.   Skin:  Negative for rash.   Neurological:  Positive for speech difficulty, weakness and headaches. Negative for dizziness and syncope.   Hematological:  Negative for adenopathy. Does not bruise/bleed easily.   Psychiatric/Behavioral:  Negative for confusion and suicidal ideas.          PAST MEDICAL HISTORY    has a past medical history of Aneurysm of infrarenal abdominal aorta (HCC), Atrial fibrillation ([facility]), BPH (benign prostatic hyperplasia), CAD (coronary artery disease), Cerebrovascular disease, CHF (congestive heart failure) ([facility]), DVT (deep

## 2025-01-18 PROBLEM — I48.0 PAROXYSMAL ATRIAL FIBRILLATION WITH CONVERSION PAUSES (HCC): Status: ACTIVE | Noted: 2025-01-18

## 2025-01-18 PROBLEM — I49.5 PAROXYSMAL ATRIAL FIBRILLATION WITH CONVERSION PAUSES (HCC): Status: ACTIVE | Noted: 2025-01-18

## 2025-01-18 LAB
ANION GAP SERPL CALCULATED.3IONS-SCNC: 13 MMOL/L (ref 9–17)
BACTERIA URNS QL MICRO: ABNORMAL
BASOPHILS # BLD: <0.03 K/UL (ref 0–0.2)
BASOPHILS NFR BLD: 0 % (ref 0–2)
BILIRUB UR QL STRIP: NEGATIVE
BUN SERPL-MCNC: 22 MG/DL (ref 8–23)
BUN/CREAT SERPL: 20 (ref 9–20)
CALCIUM SERPL-MCNC: 8.9 MG/DL (ref 8.6–10.4)
CHARACTER UR: ABNORMAL
CHLORIDE SERPL-SCNC: 104 MMOL/L (ref 98–107)
CHOLEST SERPL-MCNC: 101 MG/DL (ref 0–199)
CHOLESTEROL/HDL RATIO: 2.5
CLARITY UR: CLEAR
CO2 SERPL-SCNC: 24 MMOL/L (ref 20–31)
COLOR UR: YELLOW
CREAT SERPL-MCNC: 1.1 MG/DL (ref 0.7–1.2)
EOSINOPHIL # BLD: 0.06 K/UL (ref 0–0.44)
EOSINOPHILS RELATIVE PERCENT: 1 % (ref 1–4)
EPI CELLS #/AREA URNS HPF: ABNORMAL /HPF (ref 0–5)
ERYTHROCYTE [DISTWIDTH] IN BLOOD BY AUTOMATED COUNT: 15.2 % (ref 11.8–14.4)
GFR, ESTIMATED: 63 ML/MIN/1.73M2
GLUCOSE SERPL-MCNC: 91 MG/DL (ref 70–99)
GLUCOSE UR STRIP-MCNC: NEGATIVE MG/DL
HCT VFR BLD AUTO: 47.1 % (ref 40.7–50.3)
HDLC SERPL-MCNC: 41 MG/DL
HGB BLD-MCNC: 15.1 G/DL (ref 13–17)
HGB UR QL STRIP.AUTO: ABNORMAL
IMM GRANULOCYTES # BLD AUTO: <0.03 K/UL (ref 0–0.3)
IMM GRANULOCYTES NFR BLD: 0 %
KETONES UR STRIP-MCNC: NEGATIVE MG/DL
LDLC SERPL CALC-MCNC: 49 MG/DL (ref 0–100)
LEUKOCYTE ESTERASE UR QL STRIP: ABNORMAL
LYMPHOCYTES NFR BLD: 0.97 K/UL (ref 1.1–3.7)
LYMPHOCYTES RELATIVE PERCENT: 13 % (ref 24–43)
MCH RBC QN AUTO: 29.3 PG (ref 25.2–33.5)
MCHC RBC AUTO-ENTMCNC: 32.1 G/DL (ref 25.2–33.5)
MCV RBC AUTO: 91.5 FL (ref 82.6–102.9)
MONOCYTES NFR BLD: 0.61 K/UL (ref 0.1–1.2)
MONOCYTES NFR BLD: 8 % (ref 3–12)
NEUTROPHILS NFR BLD: 78 % (ref 36–65)
NEUTS SEG NFR BLD: 5.79 K/UL (ref 1.5–8.1)
NITRITE UR QL STRIP: POSITIVE
NRBC BLD-RTO: 0 PER 100 WBC
PH UR STRIP: 5.5 [PH] (ref 5–6)
PLATELET # BLD AUTO: ABNORMAL K/UL (ref 138–453)
PLATELET, FLUORESCENCE: 121 K/UL (ref 138–453)
PLATELETS.RETICULATED NFR BLD AUTO: 7.6 % (ref 1.1–10.3)
POTASSIUM SERPL-SCNC: 3.6 MMOL/L (ref 3.7–5.3)
PROT UR STRIP-MCNC: ABNORMAL MG/DL
RBC # BLD AUTO: 5.15 M/UL (ref 4.21–5.77)
RBC #/AREA URNS HPF: ABNORMAL /HPF (ref 0–4)
SODIUM SERPL-SCNC: 141 MMOL/L (ref 135–144)
SP GR UR STRIP: 1.01 (ref 1.01–1.02)
TRIGL SERPL-MCNC: 55 MG/DL
TROPONIN I SERPL HS-MCNC: 39 NG/L (ref 0–22)
TROPONIN I SERPL HS-MCNC: 42 NG/L (ref 0–22)
TSH SERPL DL<=0.05 MIU/L-ACNC: 1.87 UIU/ML (ref 0.3–5)
UROBILINOGEN UR STRIP-ACNC: NORMAL EU/DL (ref 0–1)
VLDLC SERPL CALC-MCNC: 11 MG/DL (ref 1–30)
WBC #/AREA URNS HPF: ABNORMAL /HPF (ref 0–4)
WBC OTHER # BLD: 7.5 K/UL (ref 3.5–11.3)

## 2025-01-18 PROCEDURE — 84443 ASSAY THYROID STIM HORMONE: CPT

## 2025-01-18 PROCEDURE — 84484 ASSAY OF TROPONIN QUANT: CPT

## 2025-01-18 PROCEDURE — 2060000000 HC ICU INTERMEDIATE R&B

## 2025-01-18 PROCEDURE — 36415 COLL VENOUS BLD VENIPUNCTURE: CPT

## 2025-01-18 PROCEDURE — 6360000002 HC RX W HCPCS: Performed by: NURSE PRACTITIONER

## 2025-01-18 PROCEDURE — 94760 N-INVAS EAR/PLS OXIMETRY 1: CPT

## 2025-01-18 PROCEDURE — 2500000003 HC RX 250 WO HCPCS: Performed by: NURSE PRACTITIONER

## 2025-01-18 PROCEDURE — 80048 BASIC METABOLIC PNL TOTAL CA: CPT

## 2025-01-18 PROCEDURE — 80061 LIPID PANEL: CPT

## 2025-01-18 PROCEDURE — 85025 COMPLETE CBC W/AUTO DIFF WBC: CPT

## 2025-01-18 PROCEDURE — 6370000000 HC RX 637 (ALT 250 FOR IP): Performed by: NURSE PRACTITIONER

## 2025-01-18 PROCEDURE — 6370000000 HC RX 637 (ALT 250 FOR IP): Performed by: FAMILY MEDICINE

## 2025-01-18 PROCEDURE — 2580000003 HC RX 258: Performed by: NURSE PRACTITIONER

## 2025-01-18 RX ORDER — TAMSULOSIN HYDROCHLORIDE 0.4 MG/1
0.8 CAPSULE ORAL DAILY
Status: DISCONTINUED | OUTPATIENT
Start: 2025-01-19 | End: 2025-01-19 | Stop reason: HOSPADM

## 2025-01-18 RX ORDER — TAMSULOSIN HYDROCHLORIDE 0.4 MG/1
0.4 CAPSULE ORAL ONCE
Status: COMPLETED | OUTPATIENT
Start: 2025-01-18 | End: 2025-01-18

## 2025-01-18 RX ORDER — METOPROLOL SUCCINATE 25 MG/1
25 TABLET, EXTENDED RELEASE ORAL DAILY
Status: DISCONTINUED | OUTPATIENT
Start: 2025-01-18 | End: 2025-01-19 | Stop reason: HOSPADM

## 2025-01-18 RX ADMIN — SODIUM CHLORIDE, PRESERVATIVE FREE 10 ML: 5 INJECTION INTRAVENOUS at 17:51

## 2025-01-18 RX ADMIN — ATORVASTATIN CALCIUM 10 MG: 10 TABLET, FILM COATED ORAL at 08:40

## 2025-01-18 RX ADMIN — Medication 3 MG: at 20:11

## 2025-01-18 RX ADMIN — BUMETANIDE 1 MG: 0.25 INJECTION INTRAMUSCULAR; INTRAVENOUS at 17:50

## 2025-01-18 RX ADMIN — SODIUM CHLORIDE, PRESERVATIVE FREE 10 ML: 5 INJECTION INTRAVENOUS at 08:40

## 2025-01-18 RX ADMIN — APIXABAN 2.5 MG: 2.5 TABLET, FILM COATED ORAL at 20:06

## 2025-01-18 RX ADMIN — METOPROLOL SUCCINATE 25 MG: 25 TABLET, EXTENDED RELEASE ORAL at 08:40

## 2025-01-18 RX ADMIN — ACETAMINOPHEN 650 MG: 325 TABLET ORAL at 08:39

## 2025-01-18 RX ADMIN — CEFTRIAXONE 1000 MG: 1 INJECTION, POWDER, FOR SOLUTION INTRAMUSCULAR; INTRAVENOUS at 07:29

## 2025-01-18 RX ADMIN — HYDRALAZINE HYDROCHLORIDE 50 MG: 25 TABLET ORAL at 14:27

## 2025-01-18 RX ADMIN — BUMETANIDE 1 MG: 0.25 INJECTION INTRAMUSCULAR; INTRAVENOUS at 08:40

## 2025-01-18 RX ADMIN — MICONAZOLE NITRATE: 20 POWDER TOPICAL at 02:37

## 2025-01-18 RX ADMIN — MICONAZOLE NITRATE: 20 POWDER TOPICAL at 08:36

## 2025-01-18 RX ADMIN — Medication 3 MG: at 02:56

## 2025-01-18 RX ADMIN — TAMSULOSIN HYDROCHLORIDE 0.4 MG: 0.4 CAPSULE ORAL at 08:40

## 2025-01-18 RX ADMIN — AMLODIPINE BESYLATE 5 MG: 5 TABLET ORAL at 08:40

## 2025-01-18 RX ADMIN — PHENOL 1 SPRAY: 1.5 LIQUID ORAL at 14:26

## 2025-01-18 RX ADMIN — SODIUM CHLORIDE, PRESERVATIVE FREE 10 ML: 5 INJECTION INTRAVENOUS at 20:11

## 2025-01-18 RX ADMIN — POTASSIUM CHLORIDE 20 MEQ: 1500 TABLET, EXTENDED RELEASE ORAL at 08:40

## 2025-01-18 RX ADMIN — HYDRALAZINE HYDROCHLORIDE 50 MG: 25 TABLET ORAL at 05:00

## 2025-01-18 RX ADMIN — TAMSULOSIN HYDROCHLORIDE 0.4 MG: 0.4 CAPSULE ORAL at 12:40

## 2025-01-18 RX ADMIN — SODIUM CHLORIDE: 9 INJECTION, SOLUTION INTRAVENOUS at 07:25

## 2025-01-18 RX ADMIN — APIXABAN 2.5 MG: 2.5 TABLET, FILM COATED ORAL at 08:40

## 2025-01-18 RX ADMIN — HYDRALAZINE HYDROCHLORIDE 50 MG: 25 TABLET ORAL at 20:06

## 2025-01-18 ASSESSMENT — PAIN - FUNCTIONAL ASSESSMENT
PAIN_FUNCTIONAL_ASSESSMENT: ACTIVITIES ARE NOT PREVENTED
PAIN_FUNCTIONAL_ASSESSMENT: ACTIVITIES ARE NOT PREVENTED

## 2025-01-18 ASSESSMENT — PAIN SCALES - GENERAL
PAINLEVEL_OUTOF10: 3
PAINLEVEL_OUTOF10: 0
PAINLEVEL_OUTOF10: 2
PAINLEVEL_OUTOF10: 3

## 2025-01-18 ASSESSMENT — PAIN DESCRIPTION - LOCATION
LOCATION: THROAT
LOCATION: THROAT

## 2025-01-18 ASSESSMENT — PAIN DESCRIPTION - DESCRIPTORS
DESCRIPTORS: SORE
DESCRIPTORS: SORE

## 2025-01-18 NOTE — PLAN OF CARE
Problem: Chronic Conditions and Co-morbidities  Goal: Patient's chronic conditions and co-morbidity symptoms are monitored and maintained or improved  Outcome: Progressing  Flowsheets (Taken 1/17/2025 2109)  Care Plan - Patient's Chronic Conditions and Co-Morbidity Symptoms are Monitored and Maintained or Improved:   Monitor and assess patient's chronic conditions and comorbid symptoms for stability, deterioration, or improvement   Collaborate with multidisciplinary team to address chronic and comorbid conditions and prevent exacerbation or deterioration   Update acute care plan with appropriate goals if chronic or comorbid symptoms are exacerbated and prevent overall improvement and discharge     Problem: Discharge Planning  Goal: Discharge to home or other facility with appropriate resources  Outcome: Progressing  Flowsheets  Taken 1/17/2025 2136  Discharge to home or other facility with appropriate resources:   Identify barriers to discharge with patient and caregiver   Arrange for needed discharge resources and transportation as appropriate   Identify discharge learning needs (meds, wound care, etc)   Refer to discharge planning if patient needs post-hospital services based on physician order or complex needs related to functional status, cognitive ability or social support system  Taken 1/17/2025 2109  Discharge to home or other facility with appropriate resources:   Identify barriers to discharge with patient and caregiver   Arrange for needed discharge resources and transportation as appropriate   Identify discharge learning needs (meds, wound care, etc)   Refer to discharge planning if patient needs post-hospital services based on physician order or complex needs related to functional status, cognitive ability or social support system     Problem: Safety - Adult  Goal: Free from fall injury  Outcome: Progressing  Flowsheets (Taken 1/17/2025 2109)  Free From Fall Injury:   Based on caregiver fall risk

## 2025-01-18 NOTE — ED NOTES
ED Report    Presents to ED from: Home    Chief Complaint   Patient presents with    Shortness of Breath    left side weakness     Breathing getting worse in last 4 days     1. Acute on chronic congestive heart failure, unspecified heart failure type (HCC)    2. Altered mental status, unspecified altered mental status type      Present Condition: patient very Unga; poor historian; arrived on oxygen at 2L/min and squad stating unable to get SpO2 en route.  Pertinent Event(s): c/o increased SOB worsening over last few days. Also c/o stroke like sx with slurred speech and Left sided weakness with LKW 4 days ago.     LOC:  A+O x 4  Code Status: DNRCC-A    Vital Signs   Vitals:    01/17/25 1630 01/17/25 1700 01/17/25 1730 01/17/25 1832   BP: (!) 176/106 (!) 194/114 (!) 181/116 (!) 208/95   Pulse: 69 62 67    Resp: 13 28 27    Temp:       TempSrc:       SpO2: 94% 95% 91%    Weight:       Height:         Oxygen Baseline: Room Air       Current Oxygen Needs: Room Air  Mobility: x1 Assist; stood at bedside to use urinal with minimal assist x1; unsure of how patient will do with any ambulation       Mobility Aide: Walker    LDAs:   Peripheral IV 01/17/25 Distal;Left Cephalic (Active)     Abnormal ED Labs:    Labs Reviewed   CBC WITH AUTO DIFFERENTIAL - Abnormal; Notable for the following components:       Result Value    RDW 15.3 (*)     Platelet, Fluorescence 129 (*)     Neutrophils % 77 (*)     Lymphocytes % 14 (*)     Lymphocytes Absolute 0.99 (*)     All other components within normal limits   BRAIN NATRIURETIC PEPTIDE - Abnormal; Notable for the following components:    NT Pro-BNP 9,189 (*)     All other components within normal limits   COMPREHENSIVE METABOLIC PANEL W/ REFLEX TO MG FOR LOW K - Abnormal; Notable for the following components:    Total Bilirubin 1.3 (*)     Alkaline Phosphatase 133 (*)     All other components within normal limits   PROTIME-INR - Abnormal; Notable for the following components:    Protime

## 2025-01-18 NOTE — H&P
HOSPITALIST ADMISSION H&P    REASON FOR ADMISSION:  Acute on chronic combined CHF -- hypertension, uncontrolled  ESTIMATED LENGTH OF STAY:>2 midnights, 3 days    ATTENDING/ADMITTING PHYSICIAN: Xavier Hernandez MD    HISTORY OF PRESENT ILLNESS:      The patient is a 89 y.o. male patient of Claudio Alford MD who presents from the ER with c/o cough and shortness of breath for the past 4-5 days. Per report his daughter called EMS because the patient told her he also felt he had had some intermittent left sided weakness and dysarthria/speech difficulty over the past 5 days. The patient denies chest pain, vomiting, fevers, acute vision changes (has poor vision chronically), or fall but is a poor historian and quite hard of hearing. Per EMR review, he has not seen his PCP since October of 2023. The patient does not recall what home medications he takes. His daughter stated she would check his house for his pill bottles and bring them to the hospital. He resides home alone and refuses any home health or assistance.     In ER, he was afebrile, WBC 7.3, lactic acid 1.3. No focal or unilateral deficits appreciated on exam by Dr. Ames besides mild facial droop and mild dysarthria. Covid and influenza negative. Chest xray impression: Mild cardiomegaly with central pulmonary vascular congestion. Small bilateral pleural effusions. ProBNP 9,189. Head CT Impression: 1.  No acute intracranial bleed. 2. Senescent changes. 3. White matter disease described is typical of microvascular ischemic disease or as sequela of dysmyelinating/demyelinating processes. EKG showed his chronic atrial fibrillation, rate controlled, old septal infarct, nonspecific ST/T wave abnormality. Troponin 35. In ER, he received 40 mg IV lasix.     Hypertension, uncontrolled -- systolic 180-190 and diastolic in the 110's.     Persistent atrial fibrillation -- previously prescribed eliquis and beta blocker     CKD stage 3b at baseline -- currently stage

## 2025-01-19 VITALS
BODY MASS INDEX: 33.27 KG/M2 | OXYGEN SATURATION: 97 % | RESPIRATION RATE: 20 BRPM | DIASTOLIC BLOOD PRESSURE: 85 MMHG | WEIGHT: 219.5 LBS | HEIGHT: 68 IN | TEMPERATURE: 97.7 F | HEART RATE: 81 BPM | SYSTOLIC BLOOD PRESSURE: 151 MMHG

## 2025-01-19 LAB
ANION GAP SERPL CALCULATED.3IONS-SCNC: 11 MMOL/L (ref 9–17)
BASOPHILS # BLD: <0.03 K/UL (ref 0–0.2)
BASOPHILS NFR BLD: 0 % (ref 0–2)
BUN SERPL-MCNC: 20 MG/DL (ref 8–23)
BUN/CREAT SERPL: 18 (ref 9–20)
CALCIUM SERPL-MCNC: 8.8 MG/DL (ref 8.6–10.4)
CHLORIDE SERPL-SCNC: 105 MMOL/L (ref 98–107)
CO2 SERPL-SCNC: 28 MMOL/L (ref 20–31)
CREAT SERPL-MCNC: 1.1 MG/DL (ref 0.7–1.2)
EKG Q-T INTERVAL: 432 MS
EKG Q-T INTERVAL: 468 MS
EKG QRS DURATION: 104 MS
EKG QRS DURATION: 104 MS
EKG QTC CALCULATION (BAZETT): 466 MS
EKG QTC CALCULATION (BAZETT): 494 MS
EKG R AXIS: 70 DEGREES
EKG R AXIS: 78 DEGREES
EKG T AXIS: -101 DEGREES
EKG T AXIS: -113 DEGREES
EKG VENTRICULAR RATE: 67 BPM
EKG VENTRICULAR RATE: 70 BPM
EOSINOPHIL # BLD: 0.06 K/UL (ref 0–0.44)
EOSINOPHILS RELATIVE PERCENT: 1 % (ref 1–4)
ERYTHROCYTE [DISTWIDTH] IN BLOOD BY AUTOMATED COUNT: 15.4 % (ref 11.8–14.4)
GFR, ESTIMATED: 63 ML/MIN/1.73M2
GLUCOSE SERPL-MCNC: 101 MG/DL (ref 70–99)
HCT VFR BLD AUTO: 44.7 % (ref 40.7–50.3)
HGB BLD-MCNC: 14.4 G/DL (ref 13–17)
IMM GRANULOCYTES # BLD AUTO: 0.04 K/UL (ref 0–0.3)
IMM GRANULOCYTES NFR BLD: 1 %
LYMPHOCYTES NFR BLD: 0.93 K/UL (ref 1.1–3.7)
LYMPHOCYTES RELATIVE PERCENT: 12 % (ref 24–43)
MAGNESIUM SERPL-MCNC: 1.8 MG/DL (ref 1.6–2.6)
MCH RBC QN AUTO: 29 PG (ref 25.2–33.5)
MCHC RBC AUTO-ENTMCNC: 32.2 G/DL (ref 25.2–33.5)
MCV RBC AUTO: 89.9 FL (ref 82.6–102.9)
MONOCYTES NFR BLD: 0.68 K/UL (ref 0.1–1.2)
MONOCYTES NFR BLD: 9 % (ref 3–12)
NEUTROPHILS NFR BLD: 78 % (ref 36–65)
NEUTS SEG NFR BLD: 6.05 K/UL (ref 1.5–8.1)
NRBC BLD-RTO: 0 PER 100 WBC
PLATELET # BLD AUTO: ABNORMAL K/UL (ref 138–453)
PLATELET, FLUORESCENCE: 111 K/UL (ref 138–453)
PLATELETS.RETICULATED NFR BLD AUTO: 7.6 % (ref 1.1–10.3)
POTASSIUM SERPL-SCNC: 3.4 MMOL/L (ref 3.7–5.3)
RBC # BLD AUTO: 4.97 M/UL (ref 4.21–5.77)
SODIUM SERPL-SCNC: 144 MMOL/L (ref 135–144)
WBC OTHER # BLD: 7.8 K/UL (ref 3.5–11.3)

## 2025-01-19 PROCEDURE — 6370000000 HC RX 637 (ALT 250 FOR IP): Performed by: NURSE PRACTITIONER

## 2025-01-19 PROCEDURE — 85025 COMPLETE CBC W/AUTO DIFF WBC: CPT

## 2025-01-19 PROCEDURE — 99238 HOSP IP/OBS DSCHRG MGMT 30/<: CPT | Performed by: FAMILY MEDICINE

## 2025-01-19 PROCEDURE — 80048 BASIC METABOLIC PNL TOTAL CA: CPT

## 2025-01-19 PROCEDURE — 6360000002 HC RX W HCPCS: Performed by: NURSE PRACTITIONER

## 2025-01-19 PROCEDURE — 36415 COLL VENOUS BLD VENIPUNCTURE: CPT

## 2025-01-19 PROCEDURE — 6360000002 HC RX W HCPCS: Performed by: FAMILY MEDICINE

## 2025-01-19 PROCEDURE — 2500000003 HC RX 250 WO HCPCS: Performed by: FAMILY MEDICINE

## 2025-01-19 PROCEDURE — 83735 ASSAY OF MAGNESIUM: CPT

## 2025-01-19 PROCEDURE — 2500000003 HC RX 250 WO HCPCS: Performed by: NURSE PRACTITIONER

## 2025-01-19 PROCEDURE — 94760 N-INVAS EAR/PLS OXIMETRY 1: CPT

## 2025-01-19 PROCEDURE — 6370000000 HC RX 637 (ALT 250 FOR IP): Performed by: FAMILY MEDICINE

## 2025-01-19 RX ORDER — TAMSULOSIN HYDROCHLORIDE 0.4 MG/1
CAPSULE ORAL
Qty: 30 CAPSULE | Refills: 0 | Status: SHIPPED | OUTPATIENT
Start: 2025-01-19

## 2025-01-19 RX ORDER — BUMETANIDE 1 MG/1
1 TABLET ORAL DAILY
Qty: 30 TABLET | Refills: 1 | Status: SHIPPED | OUTPATIENT
Start: 2025-01-19 | End: 2025-01-22 | Stop reason: SDUPTHER

## 2025-01-19 RX ORDER — METOPROLOL SUCCINATE 50 MG/1
TABLET, EXTENDED RELEASE ORAL
Qty: 30 TABLET | Refills: 0 | Status: SHIPPED | OUTPATIENT
Start: 2025-01-19

## 2025-01-19 RX ORDER — POTASSIUM CHLORIDE 1500 MG/1
20 TABLET, EXTENDED RELEASE ORAL DAILY
Qty: 30 TABLET | Refills: 1 | Status: SHIPPED | OUTPATIENT
Start: 2025-01-19

## 2025-01-19 RX ORDER — AMLODIPINE BESYLATE 5 MG/1
5 TABLET ORAL DAILY
Qty: 30 TABLET | Refills: 0 | Status: SHIPPED | OUTPATIENT
Start: 2025-01-19

## 2025-01-19 RX ORDER — POTASSIUM CHLORIDE 1500 MG/1
20 TABLET, EXTENDED RELEASE ORAL 2 TIMES DAILY WITH MEALS
Status: DISCONTINUED | OUTPATIENT
Start: 2025-01-19 | End: 2025-01-19 | Stop reason: HOSPADM

## 2025-01-19 RX ADMIN — SODIUM CHLORIDE, PRESERVATIVE FREE 10 ML: 5 INJECTION INTRAVENOUS at 10:13

## 2025-01-19 RX ADMIN — MICONAZOLE NITRATE: 20 POWDER TOPICAL at 00:01

## 2025-01-19 RX ADMIN — HYDRALAZINE HYDROCHLORIDE 50 MG: 25 TABLET ORAL at 06:23

## 2025-01-19 RX ADMIN — ATORVASTATIN CALCIUM 10 MG: 10 TABLET, FILM COATED ORAL at 10:14

## 2025-01-19 RX ADMIN — BUMETANIDE 1 MG: 0.25 INJECTION INTRAMUSCULAR; INTRAVENOUS at 10:12

## 2025-01-19 RX ADMIN — TAMSULOSIN HYDROCHLORIDE 0.8 MG: 0.4 CAPSULE ORAL at 10:13

## 2025-01-19 RX ADMIN — APIXABAN 2.5 MG: 2.5 TABLET, FILM COATED ORAL at 10:13

## 2025-01-19 RX ADMIN — MICONAZOLE NITRATE: 20 POWDER TOPICAL at 15:09

## 2025-01-19 RX ADMIN — HYDRALAZINE HYDROCHLORIDE 50 MG: 25 TABLET ORAL at 15:13

## 2025-01-19 RX ADMIN — WATER 1000 MG: 1 INJECTION INTRAMUSCULAR; INTRAVENOUS; SUBCUTANEOUS at 06:23

## 2025-01-19 RX ADMIN — AMLODIPINE BESYLATE 5 MG: 5 TABLET ORAL at 10:14

## 2025-01-19 NOTE — FLOWSHEET NOTE
Monitor shows a 12 second run of Vtach at a rate of 150. Patient was resting in bed with his eyes closed,  Awaken and vital signs taken. Patient denies any complaints.

## 2025-01-19 NOTE — PROGRESS NOTES
Hospitalist Progress Note  1/19/2025 9:30 AM  Subjective:   Admit Date: 1/17/2025  PCP: Claudio Alford MD    Interval History: Patient with Combined CHF and HTN was non complaint on medications at home is back on diuretics shortness of breath  is improved.BP is more stable today.    Diet: ADULT DIET; Regular; Low Fat/Low Chol/High Fiber/2 gm Na; 1800 ml  Medications:   Scheduled Meds:   miconazole   Topical BID    metoprolol succinate  25 mg Oral Daily    cefTRIAXone (ROCEPHIN) 1,000 mg in sterile water 10 mL IV syringe  1,000 mg IntraVENous Q24H    tamsulosin  0.8 mg Oral Daily    amLODIPine  5 mg Oral Daily    apixaban  2.5 mg Oral BID    potassium chloride  20 mEq Oral Daily    atorvastatin  10 mg Oral Daily    sodium chloride flush  5-40 mL IntraVENous 2 times per day    bumetanide  1 mg IntraVENous BID    hydrALAZINE  50 mg Oral 3 times per day     Continuous Infusions:   sodium chloride Stopped (01/18/25 0831)     CBC:   Recent Labs     01/17/25  1615 01/18/25  0357 01/19/25  0654   WBC 7.3 7.5 7.8   HGB 14.5 15.1 14.4   PLT See Reflexed IPF Result See Reflexed IPF Result See Reflexed IPF Result     BMP:    Recent Labs     01/17/25  1615 01/18/25  0357 01/19/25  0654    141 144   K 4.1 3.6* 3.4*    104 105   CO2 23 24 28   BUN 22 22 20   CREATININE 1.1 1.1 1.1   GLUCOSE 92 91 101*     Hepatic:   Recent Labs     01/17/25  1615   AST 23   ALT 14   BILITOT 1.3*   ALKPHOS 133*     Troponin: No results for input(s): \"TROPONINI\" in the last 72 hours.  BNP: No results for input(s): \"BNP\" in the last 72 hours.  Lipids:   Recent Labs     01/18/25  0357   CHOL 101   HDL 41     INR:   Recent Labs     01/17/25  1615   INR 1.3       Objective:   Vitals: BP (!) 140/68   Pulse 68   Temp 97.5 °F (36.4 °C) (Temporal)   Resp 20   Ht 1.727 m (5' 8\")   Wt 99.6 kg (219 lb 8 oz)   SpO2 93%   BMI 33.37 kg/m²   General appearance: alert and cooperative with exam  HEENT: Eye: Normal external eye, conjunctiva, lids

## 2025-01-19 NOTE — PLAN OF CARE
Problem: Chronic Conditions and Co-morbidities  Goal: Patient's chronic conditions and co-morbidity symptoms are monitored and maintained or improved  1/19/2025 1030 by Pam Vance RN  Outcome: Progressing  Flowsheets (Taken 1/19/2025 1027)  Care Plan - Patient's Chronic Conditions and Co-Morbidity Symptoms are Monitored and Maintained or Improved:   Monitor and assess patient's chronic conditions and comorbid symptoms for stability, deterioration, or improvement   Collaborate with multidisciplinary team to address chronic and comorbid conditions and prevent exacerbation or deterioration   Update acute care plan with appropriate goals if chronic or comorbid symptoms are exacerbated and prevent overall improvement and discharge  1/19/2025 0641 by Nilsa Dougherty, RN  Outcome: Progressing     Problem: Discharge Planning  Goal: Discharge to home or other facility with appropriate resources  1/19/2025 1030 by Pam Vance RN  Outcome: Progressing  Flowsheets (Taken 1/19/2025 1027)  Discharge to home or other facility with appropriate resources:   Identify barriers to discharge with patient and caregiver   Arrange for needed discharge resources and transportation as appropriate   Identify discharge learning needs (meds, wound care, etc)   Arrange for interpreters to assist at discharge as needed   Refer to discharge planning if patient needs post-hospital services based on physician order or complex needs related to functional status, cognitive ability or social support system  1/19/2025 0641 by Nilsa Dougherty, RN  Outcome: Progressing     Problem: Safety - Adult  Goal: Free from fall injury  1/19/2025 1030 by Pam Vance RN  Outcome: Progressing  1/19/2025 0641 by Nilsa Dougherty, RN  Outcome: Progressing     Problem: Pain  Goal: Verbalizes/displays adequate comfort level or baseline comfort level  1/19/2025 1030 by Pam Vance RN  Outcome: Progressing  1/19/2025 0641 by Nilsa Dougherty

## 2025-01-19 NOTE — PLAN OF CARE
Problem: Chronic Conditions and Co-morbidities  Goal: Patient's chronic conditions and co-morbidity symptoms are monitored and maintained or improved  1/19/2025 1226 by Pam Vance RN  Outcome: Completed  1/19/2025 1030 by Pam Vance RN  Outcome: Progressing  Flowsheets (Taken 1/19/2025 1027)  Care Plan - Patient's Chronic Conditions and Co-Morbidity Symptoms are Monitored and Maintained or Improved:   Monitor and assess patient's chronic conditions and comorbid symptoms for stability, deterioration, or improvement   Collaborate with multidisciplinary team to address chronic and comorbid conditions and prevent exacerbation or deterioration   Update acute care plan with appropriate goals if chronic or comorbid symptoms are exacerbated and prevent overall improvement and discharge  1/19/2025 0641 by Nilsa Dougherty RN  Outcome: Progressing     Problem: Discharge Planning  Goal: Discharge to home or other facility with appropriate resources  1/19/2025 1226 by Pam Vance RN  Outcome: Completed  1/19/2025 1030 by Pam Vance RN  Outcome: Progressing  Flowsheets (Taken 1/19/2025 1027)  Discharge to home or other facility with appropriate resources:   Identify barriers to discharge with patient and caregiver   Arrange for needed discharge resources and transportation as appropriate   Identify discharge learning needs (meds, wound care, etc)   Arrange for interpreters to assist at discharge as needed   Refer to discharge planning if patient needs post-hospital services based on physician order or complex needs related to functional status, cognitive ability or social support system  1/19/2025 0641 by Nilsa Dougherty, RN  Outcome: Progressing     Problem: Safety - Adult  Goal: Free from fall injury  1/19/2025 1226 by aPm Vance RN  Outcome: Completed  1/19/2025 1030 by Pam Vance RN  Outcome: Progressing  1/19/2025 0641 by Nilsa Dougherty RN  Outcome: Progressing     Problem: Pain  Goal:

## 2025-01-19 NOTE — PLAN OF CARE
Problem: Chronic Conditions and Co-morbidities  Goal: Patient's chronic conditions and co-morbidity symptoms are monitored and maintained or improved  Outcome: Progressing     Problem: Discharge Planning  Goal: Discharge to home or other facility with appropriate resources  Outcome: Progressing     Problem: Safety - Adult  Goal: Free from fall injury  Outcome: Progressing     Problem: Pain  Goal: Verbalizes/displays adequate comfort level or baseline comfort level  Outcome: Progressing     Problem: Skin/Tissue Integrity  Goal: Absence of new skin breakdown  Outcome: Progressing     Problem: ABCDS Injury Assessment  Goal: Absence of physical injury  Outcome: Progressing     Problem: Neurosensory - Adult  Goal: Achieves stable or improved neurological status  Outcome: Progressing     Problem: Respiratory - Adult  Goal: Achieves optimal ventilation and oxygenation  Outcome: Progressing     Problem: Cardiovascular - Adult  Goal: Maintains optimal cardiac output and hemodynamic stability  Outcome: Progressing  Goal: Absence of cardiac dysrhythmias or at baseline  Outcome: Progressing     Problem: Skin/Tissue Integrity - Adult  Goal: Skin integrity remains intact  Outcome: Progressing     Problem: Genitourinary - Adult  Goal: Absence of urinary retention  Outcome: Progressing

## 2025-01-19 NOTE — DISCHARGE SUMMARY
extended release tablet  Commonly known as: KLOR-CON M  Take 1 tablet by mouth daily     simvastatin 20 MG tablet  Commonly known as: ZOCOR  1 po daily     tamsulosin 0.4 MG capsule  Commonly known as: FLOMAX  TAKE 1 CAPSULE DAILY               Where to Get Your Medications        These medications were [facility] to [id number] [place] [facility] [address] - P [phone] - F [phone]  [address] , [facility] [address]      Phone: [phone]   bumetanide 1 MG tablet  potassium chloride 20 MEQ extended release tablet         Follow up Visits: Follow-up with PCP in 1wee,Fall prcautions       Total Time spent with patient and coordinating care:  30  minutes    Joleen Barkley MD  1/19/2025  11:59 AM

## 2025-01-19 NOTE — DISCHARGE INSTR - DIET
Good nutrition is important when healing from an illness, injury, or surgery.  Follow any nutrition recommendations given to you during your hospital stay.   Follow a cardiac (low fat, low cholesterol, low sodium) diet. Restrict your total fluid intake to 1,800 ml per 24 hours.   If you were given an oral nutrition supplement while in the hospital, continue to take this supplement at home.  You can take it with meals, in-between meals, and/or before bedtime. These supplements can be purchased at most local grocery stores, pharmacies, and [facility]st[facility].   If you have any questions about your diet or nutrition, call the hospital and ask for the dietitian.

## 2025-01-20 ENCOUNTER — CARE COORDINATION (OUTPATIENT)
Dept: CARE COORDINATION | Age: 89
End: 2025-01-20

## 2025-01-20 ENCOUNTER — TELEPHONE (OUTPATIENT)
Dept: FAMILY MEDICINE CLINIC | Age: 89
End: 2025-01-20

## 2025-01-20 LAB
MICROORGANISM SPEC CULT: ABNORMAL
SPECIMEN DESCRIPTION: ABNORMAL

## 2025-01-20 NOTE — CARE COORDINATION
Care Transitions Note    Initial Call - Call within 2 business days of discharge: Yes    Attempted to reach patient for transitions of care follow up. Unable to reach patient.    Outreach Attempts:   HIPAA compliant voicemail left for patient.     Patient: Aditya Kimball    Patient : 1933   MRN: [id number]    Reason for Admission: acute on chronic CHF  Discharge Date: 25  RURS: Readmission Risk Score: 11.9    Last Discharge Facility       Date Complaint Diagnosis Description Type Department Provider    25 Shortness of Breath; left side weakness Acute on chronic congestive heart failure, unspecified heart failure type (HCC) ... ED to Hosp-Admission (Discharged) (ADMITTED) Xavier Beal MD; [place]...            Was this an external facility discharge? No    Follow Up Appointment:   Patient does not have a follow up appointment scheduled at time of call.  PCP is attempting to reach patient      Plan for follow-up on next business day.      Lo Painting RN

## 2025-01-21 ENCOUNTER — CARE COORDINATION (OUTPATIENT)
Dept: CARE COORDINATION | Age: 89
End: 2025-01-21

## 2025-01-21 DIAGNOSIS — E78.00 PURE HYPERCHOLESTEROLEMIA: ICD-10-CM

## 2025-01-21 NOTE — TELEPHONE ENCOUNTER
Spoke to [place] daughter Radha. She states she will call back when she is with Aditya to see if he would like to come in for a HFU visit.

## 2025-01-21 NOTE — CARE COORDINATION
Care Transitions Note    Initial Call - Call within 2 business days of discharge: Yes    Attempted to reach patient for transitions of care follow up. Unable to reach patient. Second attempt, left VM. Closing for MAURILIO    Outreach Attempts:   Multiple attempts to contact patient at phone numbers on file.   HIPAA compliant voicemail left for patient.     Patient: Aditya Kimball    Patient : 1933   MRN: [id number]    Reason for Admission: acute on chronic CHF  Discharge Date: 25  RURS: Readmission Risk Score: 11.9    Last Discharge Facility       Date Complaint Diagnosis Description Type Department Provider    25 Shortness of Breath; left side weakness Acute on chronic congestive heart failure, unspecified heart failure type (HCC) ... ED to Hosp-Admission (Discharged) (ADMITTED) Xavier Beal MD; Nayelii...            Was this an external facility discharge? No    Follow Up Appointment:   Patient does not have a follow up appointment scheduled at time of call.  PCP is attempting outreach  Future Appointments         Provider Specialty Dept Phone    2025 2:15 PM Zach Soto MD Cardiology [phone]            No further follow-up call indicated     Lo Painting RN

## 2025-01-21 NOTE — TELEPHONE ENCOUNTER
Care Transitions Initial Follow Up Call    Outreach made within 2 business days of discharge: Yes    Patient: Aditya Kimball Patient : 1933   MRN: [id number]  Reason for Admission: CHF  Discharge Date: 25       Spoke with: Lynnette Jj    Discharge department/facility: [facility]     TCM Interactive Patient Contact:  Was patient able to fill all prescriptions: Yes  Was patient instructed to bring all medications to the follow-up visit: Yes  Is patient taking all medications as directed in the discharge summary? Yes  Does patient understand their discharge instructions: Yes  Does patient have questions or concerns that need addressed prior to 7-14 day follow up office visit: no    Additional needs identified to be addressed with provider               Scheduled appointment with PCP within 7-14 days    Follow Up  Future Appointments   Date Time Provider Department Center   2025  9:00 AM Claudio Alford MD [facility]   2025  2:15 PM Zach Soto MD [facility]       Bessy Noble MA

## 2025-01-22 ENCOUNTER — OFFICE VISIT (OUTPATIENT)
Dept: FAMILY MEDICINE CLINIC | Age: 89
End: 2025-01-22

## 2025-01-22 VITALS
OXYGEN SATURATION: 94 % | DIASTOLIC BLOOD PRESSURE: 88 MMHG | WEIGHT: 218.2 LBS | HEIGHT: 68 IN | SYSTOLIC BLOOD PRESSURE: 140 MMHG | BODY MASS INDEX: 33.07 KG/M2 | HEART RATE: 59 BPM

## 2025-01-22 DIAGNOSIS — F32.9 REACTIVE DEPRESSION: ICD-10-CM

## 2025-01-22 DIAGNOSIS — I48.19 PERSISTENT ATRIAL FIBRILLATION (HCC): ICD-10-CM

## 2025-01-22 DIAGNOSIS — I50.33 ACUTE ON CHRONIC DIASTOLIC CONGESTIVE HEART FAILURE (HCC): Primary | ICD-10-CM

## 2025-01-22 DIAGNOSIS — Z91.148 NON COMPLIANCE W MEDICATION REGIMEN: ICD-10-CM

## 2025-01-22 DIAGNOSIS — I63.10 CEREBROVASCULAR ACCIDENT (CVA) DUE TO EMBOLISM OF PRECEREBRAL ARTERY (HCC): ICD-10-CM

## 2025-01-22 LAB
MICROORGANISM SPEC CULT: NORMAL
MICROORGANISM SPEC CULT: NORMAL
SERVICE CMNT-IMP: NORMAL
SERVICE CMNT-IMP: NORMAL
SPECIMEN DESCRIPTION: NORMAL
SPECIMEN DESCRIPTION: NORMAL

## 2025-01-22 RX ORDER — SIMVASTATIN 20 MG
TABLET ORAL
Qty: 90 TABLET | Refills: 3 | OUTPATIENT
Start: 2025-01-22

## 2025-01-22 RX ORDER — BUMETANIDE 1 MG/1
1 TABLET ORAL DAILY
Qty: 90 TABLET | Refills: 3 | Status: SHIPPED | OUTPATIENT
Start: 2025-01-22

## 2025-01-22 ASSESSMENT — ENCOUNTER SYMPTOMS
CHEST TIGHTNESS: 0
GASTROINTESTINAL NEGATIVE: 1
SHORTNESS OF BREATH: 1
ALLERGIC/IMMUNOLOGIC NEGATIVE: 1

## 2025-01-22 NOTE — PROGRESS NOTES
MG tablet Take 1 tablet by mouth daily 30 tablet 0    apixaban (ELIQUIS) 2.5 MG TABS tablet Take 1 tablet by mouth 2 times daily 60 tablet 0    tamsulosin (FLOMAX) 0.4 MG capsule TAKE 1 CAPSULE DAILY 30 capsule 0    simvastatin (ZOCOR) 20 MG tablet 1 po daily 90 tablet 3     No current facility-administered medications for this visit.     No Known Allergies    Review of Systems   Constitutional:  Positive for fatigue and unexpected weight change.   HENT: Negative.     Eyes:  Positive for visual disturbance.   Respiratory:  Positive for shortness of breath. Negative for chest tightness.    Cardiovascular:  Positive for leg swelling (improviong). Negative for chest pain and palpitations.   Gastrointestinal: Negative.    Endocrine: Negative.    Genitourinary: Negative.    Musculoskeletal:  Positive for arthralgias and gait problem.   Skin: Negative.    Allergic/Immunologic: Negative.    Hematological: Negative.    Psychiatric/Behavioral: Negative.         Objective:   Physical Exam  Constitutional:       Appearance: He is well-developed.   HENT:      Head: Normocephalic and atraumatic.   Eyes:      Pupils: Pupils are equal, round, and reactive to light.   Cardiovascular:      Rate and Rhythm: Normal rate and regular rhythm.      Heart sounds: Normal heart sounds. No murmur heard.  Pulmonary:      Effort: Pulmonary effort is normal. No respiratory distress.      Breath sounds: Normal breath sounds. No wheezing or rales.   Abdominal:      General: There is no distension.      Palpations: Abdomen is soft. There is no mass.      Tenderness: There is no abdominal tenderness.   Musculoskeletal:         General: No tenderness. Normal range of motion.      Cervical back: Normal range of motion and neck supple.      Right lower leg: Edema present.      Left lower leg: Edema (left >right.) present.   Skin:     General: Skin is warm.      Findings: No erythema or rash.   Neurological:      Mental Status: He is alert.

## 2025-02-12 ENCOUNTER — OFFICE VISIT (OUTPATIENT)
Dept: CARDIOLOGY | Age: 89
End: 2025-02-12
Payer: MEDICARE

## 2025-02-12 VITALS
WEIGHT: 223 LBS | HEIGHT: 70 IN | DIASTOLIC BLOOD PRESSURE: 78 MMHG | SYSTOLIC BLOOD PRESSURE: 130 MMHG | BODY MASS INDEX: 31.92 KG/M2 | HEART RATE: 62 BPM

## 2025-02-12 DIAGNOSIS — E78.00 PURE HYPERCHOLESTEROLEMIA: ICD-10-CM

## 2025-02-12 DIAGNOSIS — I48.21 PERMANENT ATRIAL FIBRILLATION (HCC): ICD-10-CM

## 2025-02-12 DIAGNOSIS — I10 ESSENTIAL HYPERTENSION: ICD-10-CM

## 2025-02-12 DIAGNOSIS — I50.42 CHRONIC COMBINED SYSTOLIC AND DIASTOLIC CHF (CONGESTIVE HEART FAILURE) (HCC): Primary | ICD-10-CM

## 2025-02-12 DIAGNOSIS — I25.10 CORONARY ARTERY DISEASE INVOLVING NATIVE CORONARY ARTERY OF NATIVE HEART WITHOUT ANGINA PECTORIS: ICD-10-CM

## 2025-02-12 PROCEDURE — 1036F TOBACCO NON-USER: CPT | Performed by: INTERNAL MEDICINE

## 2025-02-12 PROCEDURE — 1123F ACP DISCUSS/DSCN MKR DOCD: CPT | Performed by: INTERNAL MEDICINE

## 2025-02-12 PROCEDURE — 99214 OFFICE O/P EST MOD 30 MIN: CPT | Performed by: INTERNAL MEDICINE

## 2025-02-12 PROCEDURE — G8417 CALC BMI ABV UP PARAM F/U: HCPCS | Performed by: INTERNAL MEDICINE

## 2025-02-12 PROCEDURE — 1159F MED LIST DOCD IN RCRD: CPT | Performed by: INTERNAL MEDICINE

## 2025-02-12 PROCEDURE — 99213 OFFICE O/P EST LOW 20 MIN: CPT | Performed by: INTERNAL MEDICINE

## 2025-02-12 PROCEDURE — G8427 DOCREV CUR MEDS BY ELIG CLIN: HCPCS | Performed by: INTERNAL MEDICINE

## 2025-02-12 PROCEDURE — 93005 ELECTROCARDIOGRAM TRACING: CPT | Performed by: INTERNAL MEDICINE

## 2025-02-12 PROCEDURE — 93010 ELECTROCARDIOGRAM REPORT: CPT | Performed by: INTERNAL MEDICINE

## 2025-02-12 PROCEDURE — 1111F DSCHRG MED/CURRENT MED MERGE: CPT | Performed by: INTERNAL MEDICINE

## 2025-02-12 RX ORDER — SIMVASTATIN 20 MG
TABLET ORAL
Qty: 90 TABLET | Refills: 3 | Status: SHIPPED | OUTPATIENT
Start: 2025-02-12

## 2025-02-12 RX ORDER — AMLODIPINE BESYLATE 5 MG/1
5 TABLET ORAL DAILY
Qty: 90 TABLET | Refills: 3 | Status: SHIPPED | OUTPATIENT
Start: 2025-02-12

## 2025-02-12 RX ORDER — METOPROLOL SUCCINATE 50 MG/1
TABLET, EXTENDED RELEASE ORAL
Qty: 90 TABLET | Refills: 3 | Status: SHIPPED | OUTPATIENT
Start: 2025-02-12

## 2025-02-12 RX ORDER — BUMETANIDE 1 MG/1
1 TABLET ORAL DAILY
Qty: 90 TABLET | Refills: 3 | Status: SHIPPED | OUTPATIENT
Start: 2025-02-12

## 2025-02-12 RX ORDER — POTASSIUM CHLORIDE 1500 MG/1
20 TABLET, EXTENDED RELEASE ORAL DAILY
Qty: 90 TABLET | Refills: 3 | Status: SHIPPED | OUTPATIENT
Start: 2025-02-12

## 2025-02-12 NOTE — PROGRESS NOTES
Today's Date: 2/12/2025  Patient's Name: Aditya Kimball  Patient's age: 89 y.o., 9/18/1933    Subjective:  Aditya Kimball is being seen in clinic today regarding   Chief Complaint   Patient presents with    Hypertension    Congestive Heart Failure     Post hospital  1-         he is here for follow-up after recent hospitalization related to congestive heart failure.  Daughter is present. He was given IV Bumex and lost 5 pounds with improvement of his symptoms and was discharged.  CODE STATUS was also discussed at that time and was DNR CC. He denies any chest pain. He reports dyspnea has improved.       Past Medical History:   has a past medical history of Aneurysm of infrarenal abdominal aorta (HCC), ARF (acute renal failure) (HCC), Atrial fibrillation (HCC), BPH (benign prostatic hyperplasia), CAD (coronary artery disease), Calculus of gallbladder with acute cholecystitis and obstruction, Cellulitis of right lower extremity, Cerebrovascular disease, CHF (congestive heart failure) (HCC), DVT (deep venous thrombosis) (HCC), Elevated PSA, Esophageal candidiasis (HCC), Hematuria, Hyperlipidemia, Hyperopia with astigmatism and presbyopia, Hypertension, Peripheral edema, Postoperative care for cataract of right eye, Under care of team, Wears glasses, and Wellness examination.    Past Surgical History:   has a past surgical history that includes back surgery; knee surgery (Left); Coronary artery bypass graft (11/17/2014); Cardiac surgery (11/17/2014); ERCP (12/29/2017); Cholecystectomy, laparoscopic (12/30/2017); pr laparoscopy surg cholecystectomy (N/A, 12/30/2017); pr ercp dx collection specimen brushing/washing (N/A, 12/29/2017); Intracapsular cataract extraction (Right, 07/25/2019); eye surgery (Right, 07/25/2019); Cystoscopy (06/16/2023); and TURP (N/A, 6/16/2023).    Home Medications:  Prior to Admission medications    Medication Sig Start Date End Date Taking? Authorizing Provider   sertraline

## 2025-06-18 ENCOUNTER — TELEPHONE (OUTPATIENT)
Dept: FAMILY MEDICINE CLINIC | Age: 89
End: 2025-06-18

## 2025-06-18 NOTE — TELEPHONE ENCOUNTER
Our records indicate that Aditya Kimball may benefit from medication optimization to meet the three pillars of CHF care (ACE/ARB/ARNI, Beta Blocker, and MRA therapy). The ask is to change the patient's medications below, as appropriate.     The measure definition, denominator, inclusions, and exclusions are below:

## 2025-08-27 ENCOUNTER — OFFICE VISIT (OUTPATIENT)
Dept: FAMILY MEDICINE CLINIC | Age: 89
End: 2025-08-27

## 2025-08-27 VITALS
SYSTOLIC BLOOD PRESSURE: 142 MMHG | DIASTOLIC BLOOD PRESSURE: 86 MMHG | WEIGHT: 201 LBS | BODY MASS INDEX: 28.77 KG/M2 | HEART RATE: 64 BPM | HEIGHT: 70 IN | OXYGEN SATURATION: 99 %

## 2025-08-27 DIAGNOSIS — H57.13 EYE PAIN, BILATERAL: Primary | ICD-10-CM

## 2025-08-27 ASSESSMENT — ENCOUNTER SYMPTOMS
EYE REDNESS: 1
GASTROINTESTINAL NEGATIVE: 1
EYE PAIN: 1
PHOTOPHOBIA: 0
RESPIRATORY NEGATIVE: 1

## 2025-08-27 ASSESSMENT — PATIENT HEALTH QUESTIONNAIRE - PHQ9: DEPRESSION UNABLE TO ASSESS: FUNCTIONAL CAPACITY MOTIVATION LIMITS ACCURACY

## (undated) DEVICE — ESOPHAGEAL BALLOON DILATATION CATHETER: Brand: CRE FIXED WIRE

## (undated) DEVICE — SOLUTION IRRIGATION BAL SALT SOLUTION 500 ML BTL 6/CA BSS +

## (undated) DEVICE — RETRIEVAL BALLOON CATHETER: Brand: EXTRACTOR™ PRO RX

## (undated) DEVICE — LASER SURG W22XH58IN D36IN 475LB SLD STATE FREQ DOUBLED

## (undated) DEVICE — APPLIER CLP M L L11.4IN DIA10MM ENDOSCP ROT MULT FOR LIG

## (undated) DEVICE — CYSTO/BLADDER IRRIGATION SET, REGULATING CLAMP

## (undated) DEVICE — ELECTRODE LAPAROSCOPIC LHOOK

## (undated) DEVICE — SURGICAL PROCEDURE PACK EYE

## (undated) DEVICE — CHLORAPREP 26ML ORANGE

## (undated) DEVICE — RX DELIVERY SYSTEM: Brand: NAVIFLEX™ RX DELIVERY SYSTEM

## (undated) DEVICE — DRAINBAG,ANTI-REFLUX TOWER,L/F,2000ML,LL: Brand: MEDLINE

## (undated) DEVICE — PACK PROCEDURE SURG CYSTO SVMMC LF

## (undated) DEVICE — KNIFE SURG 275MM 45DEG ANG SLT DBL BVL

## (undated) DEVICE — MICS, ANGLED, PHACO NEEDLE: Brand: BAUSCH + LOMB

## (undated) DEVICE — BALLOON DILATATION CATHETER: Brand: HURRICANE™ RX

## (undated) DEVICE — CATHETER PTCA L75CM L2CM OD5FR ODSEC5MM .035IN PERIPH 2

## (undated) DEVICE — FORCEPS BX L240CM JAW DIA22MM ORNG STD CAP W NDL RAD JAW 4

## (undated) DEVICE — TROCAR: Brand: KII FIOS FIRST ENTRY

## (undated) DEVICE — GLOVE ORANGE PI 7 1/2   MSG9075

## (undated) DEVICE — BLADE CLIPPER GEN PURP NS

## (undated) DEVICE — KENDALL SCD EXPRESS SLEEVES, KNEE LENGTH, MEDIUM: Brand: KENDALL SCD

## (undated) DEVICE — SYSTEM BX CAP BILI RAP EXCHG CAP LOK DEV COMPATIBLE W/ OLY

## (undated) DEVICE — SOLUTION IRRIGATION BAL SALT SOLUTION 15 ML STRL BSS

## (undated) DEVICE — STRAP,CATHETER,ELASTIC,HOOK&LOOP: Brand: MEDLINE

## (undated) DEVICE — SUTURE VCRL + SZ 0 L27IN ABSRB VLT L26MM UR-6 5/8 CIR VCP603H

## (undated) DEVICE — ADAPTIVE FLUIDICS BASIC PACK: Brand: BAUSCH + LOMB

## (undated) DEVICE — GLOVE SURG SZ 65 THK91MIL LTX FREE SYN POLYISOPRENE

## (undated) DEVICE — SUTURE MCRYL SZ 4-0 L18IN ABSRB UD L16MM PC-3 3/8 CIR PRIM Y845G

## (undated) DEVICE — GLOVE ORANGE PI 7   MSG9070

## (undated) DEVICE — MITT PREP W575XL775IN POVIDONE IOD HAIR REMV

## (undated) DEVICE — STRIP,CLOSURE,WOUND,MEDI-STRIP,1/2X4: Brand: MEDLINE

## (undated) DEVICE — Device: Brand: MALYUGIN RING SYSTEM 7.0MM

## (undated) DEVICE — GARMENT,MEDLINE,DVT,INT,CALF,MED, GEN2: Brand: MEDLINE

## (undated) DEVICE — Device

## (undated) DEVICE — NGPC ANTERIOR VIT CUTTER POUCHED PACK ASSEMBLY: Brand: BAUSCH + LOMB

## (undated) DEVICE — AGENT VISCOELASTIC 0.8ML 12MG/ML SOD HYALURONATE SYR AMVSC

## (undated) DEVICE — CONNECTOR,TUBING,5-IN-1,NON-STERILE: Brand: MEDLINE INDUSTRIES, INC.

## (undated) DEVICE — THIN TIP IRRIGATION SLEEVE PACK: Brand: BAUSCH + LOMB

## (undated) DEVICE — GUIDEWIRE ENDOSCP L450CM DIA0035IN ANG RND BILI HI PERF

## (undated) DEVICE — TROCAR: Brand: KII® SLEEVE

## (undated) DEVICE — DRAPE,REIN 53X77,STERILE: Brand: MEDLINE

## (undated) DEVICE — TOWEL,OR,DSP,ST,NATURAL,DLX,4/PK,20PK/CS: Brand: MEDLINE

## (undated) DEVICE — SUTURE ETHLN 10-0 L12IN NONABSORBABLE BLK TG160-6 L5.5MM 7756G

## (undated) DEVICE — RENTAL LASER XPS CASE

## (undated) DEVICE — DISSECTOR LAP DIA5MM BLNT TIP ENDOPATH

## (undated) DEVICE — CATHETER,FOLEY,3-WAY,22FR,30ML,100%SILI: Brand: MEDLINE

## (undated) DEVICE — SOLUTION ANTIFOG VIS SYS CLEARIFY LAPSCP

## (undated) DEVICE — INSUFFLATION NEEDLE TO ESTABLISH PNEUMOPERITONEUM.: Brand: INSUFFLATION NEEDLE

## (undated) DEVICE — PACK LAP BASIC

## (undated) DEVICE — SINGLE USE MEDICAL DEVICE FOR OPHTHALMIC SURGERY: Brand: SIL. COATED I/A 45 STELLARIS 12/B

## (undated) DEVICE — KNIFE OPHTHLMC 17.6X2.03X1.09MM 11MM ANGLD SDPRT LSRDGE ARRO

## (undated) DEVICE — SPHINCTEROTOME: Brand: DREAMTOME™ RX 44

## (undated) DEVICE — SCISSOR SURG CRV ENDOCUT TIP FOR LAP DISP

## (undated) DEVICE — BAG SPEC LAP H6IN DIA3IN 250ML 10 12MM CANN ATTCH MEM WIRE

## (undated) DEVICE — AGENT VISCOELASTIC HEALON DUET PRO 2 PK

## (undated) DEVICE — ELECTRODE PT RET AD L9FT HI MOIST COND ADH HYDRGEL CORDED